# Patient Record
Sex: FEMALE | Race: WHITE | NOT HISPANIC OR LATINO | Employment: OTHER | URBAN - METROPOLITAN AREA
[De-identification: names, ages, dates, MRNs, and addresses within clinical notes are randomized per-mention and may not be internally consistent; named-entity substitution may affect disease eponyms.]

---

## 2023-04-22 LAB
ALANINE AMINOTRANSFERASE (SGPT) (U/L) IN SER/PLAS: 13 U/L (ref 7–45)
ALBUMIN (G/DL) IN SER/PLAS: 3.8 G/DL (ref 3.4–5)
ALKALINE PHOSPHATASE (U/L) IN SER/PLAS: 68 U/L (ref 33–136)
ANION GAP IN SER/PLAS: 13 MMOL/L (ref 10–20)
ASPARTATE AMINOTRANSFERASE (SGOT) (U/L) IN SER/PLAS: 18 U/L (ref 9–39)
BASOPHILS (10*3/UL) IN BLOOD BY AUTOMATED COUNT: 0.05 X10E9/L (ref 0–0.1)
BASOPHILS/100 LEUKOCYTES IN BLOOD BY AUTOMATED COUNT: 0.6 % (ref 0–2)
BILIRUBIN TOTAL (MG/DL) IN SER/PLAS: 0.5 MG/DL (ref 0–1.2)
CALCIUM (MG/DL) IN SER/PLAS: 10 MG/DL (ref 8.6–10.6)
CARBON DIOXIDE, TOTAL (MMOL/L) IN SER/PLAS: 30 MMOL/L (ref 21–32)
CHLORIDE (MMOL/L) IN SER/PLAS: 107 MMOL/L (ref 98–107)
CHOLESTEROL (MG/DL) IN SER/PLAS: 182 MG/DL (ref 0–199)
CHOLESTEROL IN HDL (MG/DL) IN SER/PLAS: 45.1 MG/DL
CHOLESTEROL/HDL RATIO: 4
COBALAMIN (VITAMIN B12) (PG/ML) IN SER/PLAS: 341 PG/ML (ref 211–911)
CREATININE (MG/DL) IN SER/PLAS: 0.86 MG/DL (ref 0.5–1.05)
EOSINOPHILS (10*3/UL) IN BLOOD BY AUTOMATED COUNT: 0.17 X10E9/L (ref 0–0.7)
EOSINOPHILS/100 LEUKOCYTES IN BLOOD BY AUTOMATED COUNT: 2.2 % (ref 0–6)
ERYTHROCYTE DISTRIBUTION WIDTH (RATIO) BY AUTOMATED COUNT: 13.7 % (ref 11.5–14.5)
ERYTHROCYTE MEAN CORPUSCULAR HEMOGLOBIN CONCENTRATION (G/DL) BY AUTOMATED: 32.2 G/DL (ref 32–36)
ERYTHROCYTE MEAN CORPUSCULAR VOLUME (FL) BY AUTOMATED COUNT: 91 FL (ref 80–100)
ERYTHROCYTES (10*6/UL) IN BLOOD BY AUTOMATED COUNT: 4.9 X10E12/L (ref 4–5.2)
ESTIMATED AVERAGE GLUCOSE FOR HBA1C: 120 MG/DL
GFR FEMALE: 73 ML/MIN/1.73M2
GLUCOSE (MG/DL) IN SER/PLAS: 122 MG/DL (ref 74–99)
HEMATOCRIT (%) IN BLOOD BY AUTOMATED COUNT: 44.7 % (ref 36–46)
HEMOGLOBIN (G/DL) IN BLOOD: 14.4 G/DL (ref 12–16)
HEMOGLOBIN A1C/HEMOGLOBIN TOTAL IN BLOOD: 5.8 %
IMMATURE GRANULOCYTES/100 LEUKOCYTES IN BLOOD BY AUTOMATED COUNT: 0.5 % (ref 0–0.9)
LDL: 100 MG/DL (ref 0–99)
LEUKOCYTES (10*3/UL) IN BLOOD BY AUTOMATED COUNT: 7.9 X10E9/L (ref 4.4–11.3)
LYMPHOCYTES (10*3/UL) IN BLOOD BY AUTOMATED COUNT: 1.76 X10E9/L (ref 1.2–4.8)
LYMPHOCYTES/100 LEUKOCYTES IN BLOOD BY AUTOMATED COUNT: 22.3 % (ref 13–44)
MONOCYTES (10*3/UL) IN BLOOD BY AUTOMATED COUNT: 0.52 X10E9/L (ref 0.1–1)
MONOCYTES/100 LEUKOCYTES IN BLOOD BY AUTOMATED COUNT: 6.6 % (ref 2–10)
NEUTROPHILS (10*3/UL) IN BLOOD BY AUTOMATED COUNT: 5.35 X10E9/L (ref 1.2–7.7)
NEUTROPHILS/100 LEUKOCYTES IN BLOOD BY AUTOMATED COUNT: 67.8 % (ref 40–80)
NRBC (PER 100 WBCS) BY AUTOMATED COUNT: 0 /100 WBC (ref 0–0)
PLATELETS (10*3/UL) IN BLOOD AUTOMATED COUNT: 162 X10E9/L (ref 150–450)
POTASSIUM (MMOL/L) IN SER/PLAS: 4.8 MMOL/L (ref 3.5–5.3)
PROTEIN TOTAL: 7 G/DL (ref 6.4–8.2)
SODIUM (MMOL/L) IN SER/PLAS: 145 MMOL/L (ref 136–145)
TRIGLYCERIDE (MG/DL) IN SER/PLAS: 185 MG/DL (ref 0–149)
UREA NITROGEN (MG/DL) IN SER/PLAS: 20 MG/DL (ref 6–23)
VLDL: 37 MG/DL (ref 0–40)

## 2023-09-02 LAB
ALANINE AMINOTRANSFERASE (SGPT) (U/L) IN SER/PLAS: 11 U/L (ref 7–45)
ALBUMIN (G/DL) IN SER/PLAS: 3.8 G/DL (ref 3.4–5)
ALKALINE PHOSPHATASE (U/L) IN SER/PLAS: 57 U/L (ref 33–136)
ANION GAP IN SER/PLAS: 12 MMOL/L (ref 10–20)
ASPARTATE AMINOTRANSFERASE (SGOT) (U/L) IN SER/PLAS: 15 U/L (ref 9–39)
BASOPHILS (10*3/UL) IN BLOOD BY AUTOMATED COUNT: 0.05 X10E9/L (ref 0–0.1)
BASOPHILS/100 LEUKOCYTES IN BLOOD BY AUTOMATED COUNT: 0.7 % (ref 0–2)
BILIRUBIN TOTAL (MG/DL) IN SER/PLAS: 0.4 MG/DL (ref 0–1.2)
CALCIUM (MG/DL) IN SER/PLAS: 9.7 MG/DL (ref 8.6–10.6)
CARBON DIOXIDE, TOTAL (MMOL/L) IN SER/PLAS: 31 MMOL/L (ref 21–32)
CHLORIDE (MMOL/L) IN SER/PLAS: 105 MMOL/L (ref 98–107)
CHOLESTEROL (MG/DL) IN SER/PLAS: 167 MG/DL (ref 0–199)
CHOLESTEROL IN HDL (MG/DL) IN SER/PLAS: 41.5 MG/DL
CHOLESTEROL/HDL RATIO: 4
CREATININE (MG/DL) IN SER/PLAS: 0.91 MG/DL (ref 0.5–1.05)
EOSINOPHILS (10*3/UL) IN BLOOD BY AUTOMATED COUNT: 0.2 X10E9/L (ref 0–0.7)
EOSINOPHILS/100 LEUKOCYTES IN BLOOD BY AUTOMATED COUNT: 2.8 % (ref 0–6)
ERYTHROCYTE DISTRIBUTION WIDTH (RATIO) BY AUTOMATED COUNT: 13.8 % (ref 11.5–14.5)
ERYTHROCYTE MEAN CORPUSCULAR HEMOGLOBIN CONCENTRATION (G/DL) BY AUTOMATED: 32.3 G/DL (ref 32–36)
ERYTHROCYTE MEAN CORPUSCULAR VOLUME (FL) BY AUTOMATED COUNT: 91 FL (ref 80–100)
ERYTHROCYTES (10*6/UL) IN BLOOD BY AUTOMATED COUNT: 4.55 X10E12/L (ref 4–5.2)
ESTIMATED AVERAGE GLUCOSE FOR HBA1C: 111 MG/DL
GFR FEMALE: 68 ML/MIN/1.73M2
GLUCOSE (MG/DL) IN SER/PLAS: 111 MG/DL (ref 74–99)
HEMATOCRIT (%) IN BLOOD BY AUTOMATED COUNT: 41.5 % (ref 36–46)
HEMOGLOBIN (G/DL) IN BLOOD: 13.4 G/DL (ref 12–16)
HEMOGLOBIN A1C/HEMOGLOBIN TOTAL IN BLOOD: 5.5 %
IMMATURE GRANULOCYTES/100 LEUKOCYTES IN BLOOD BY AUTOMATED COUNT: 0.4 % (ref 0–0.9)
LDL: 91 MG/DL (ref 0–99)
LEUKOCYTES (10*3/UL) IN BLOOD BY AUTOMATED COUNT: 7.2 X10E9/L (ref 4.4–11.3)
LYMPHOCYTES (10*3/UL) IN BLOOD BY AUTOMATED COUNT: 1.81 X10E9/L (ref 1.2–4.8)
LYMPHOCYTES/100 LEUKOCYTES IN BLOOD BY AUTOMATED COUNT: 25.1 % (ref 13–44)
MONOCYTES (10*3/UL) IN BLOOD BY AUTOMATED COUNT: 0.58 X10E9/L (ref 0.1–1)
MONOCYTES/100 LEUKOCYTES IN BLOOD BY AUTOMATED COUNT: 8.1 % (ref 2–10)
NEUTROPHILS (10*3/UL) IN BLOOD BY AUTOMATED COUNT: 4.53 X10E9/L (ref 1.2–7.7)
NEUTROPHILS/100 LEUKOCYTES IN BLOOD BY AUTOMATED COUNT: 62.9 % (ref 40–80)
NRBC (PER 100 WBCS) BY AUTOMATED COUNT: 0 /100 WBC (ref 0–0)
PLATELETS (10*3/UL) IN BLOOD AUTOMATED COUNT: 170 X10E9/L (ref 150–450)
POTASSIUM (MMOL/L) IN SER/PLAS: 4 MMOL/L (ref 3.5–5.3)
PROTEIN TOTAL: 6.7 G/DL (ref 6.4–8.2)
SODIUM (MMOL/L) IN SER/PLAS: 144 MMOL/L (ref 136–145)
TRIGLYCERIDE (MG/DL) IN SER/PLAS: 174 MG/DL (ref 0–149)
UREA NITROGEN (MG/DL) IN SER/PLAS: 18 MG/DL (ref 6–23)
VLDL: 35 MG/DL (ref 0–40)

## 2023-09-06 PROBLEM — G47.33 OBSTRUCTIVE SLEEP APNEA: Status: ACTIVE | Noted: 2023-09-06

## 2023-09-06 PROBLEM — E66.9 OBESITY WITH BODY MASS INDEX 30 OR GREATER: Status: ACTIVE | Noted: 2023-09-06

## 2023-09-06 PROBLEM — M51.369 DISC DEGENERATION, LUMBAR: Status: ACTIVE | Noted: 2023-09-06

## 2023-09-06 PROBLEM — E78.5 HYPERLIPIDEMIA: Status: ACTIVE | Noted: 2023-09-06

## 2023-09-06 PROBLEM — R31.9 HEMATURIA: Status: ACTIVE | Noted: 2023-09-06

## 2023-09-06 PROBLEM — E78.2 MIXED HYPERLIPIDEMIA: Status: ACTIVE | Noted: 2023-09-06

## 2023-09-06 PROBLEM — R73.01 IMPAIRED FASTING GLUCOSE: Status: ACTIVE | Noted: 2023-09-06

## 2023-09-06 PROBLEM — Z86.718 HISTORY OF DEEP VEIN THROMBOSIS: Status: ACTIVE | Noted: 2023-09-06

## 2023-09-06 PROBLEM — G47.00 INSOMNIA: Status: ACTIVE | Noted: 2023-09-06

## 2023-09-06 PROBLEM — I34.0 MITRAL VALVE REGURGITATION: Status: ACTIVE | Noted: 2023-09-06

## 2023-09-06 PROBLEM — M47.817 LUMBOSACRAL SPONDYLOSIS: Status: ACTIVE | Noted: 2023-09-06

## 2023-09-06 PROBLEM — J43.9: Status: ACTIVE | Noted: 2023-09-06

## 2023-09-06 PROBLEM — R10.9 ABDOMINAL PAIN: Status: ACTIVE | Noted: 2023-09-06

## 2023-09-06 PROBLEM — I35.9 CALCIFICATION OF AORTIC VALVE: Status: ACTIVE | Noted: 2023-09-06

## 2023-09-06 PROBLEM — N28.1 KIDNEY CYST, ACQUIRED: Status: ACTIVE | Noted: 2023-09-06

## 2023-09-06 PROBLEM — R16.0 ENLARGED LIVER: Status: ACTIVE | Noted: 2023-09-06

## 2023-09-06 PROBLEM — Z96.651 HISTORY OF TOTAL RIGHT KNEE REPLACEMENT: Status: ACTIVE | Noted: 2023-09-06

## 2023-09-06 PROBLEM — M51.36 DISC DEGENERATION, LUMBAR: Status: ACTIVE | Noted: 2023-09-06

## 2023-09-06 PROBLEM — N39.46 MIXED STRESS AND URGE URINARY INCONTINENCE: Status: ACTIVE | Noted: 2023-09-06

## 2023-09-06 PROBLEM — M17.11 OSTEOARTHROSIS, LOCALIZED, PRIMARY, KNEE, RIGHT: Status: ACTIVE | Noted: 2023-09-06

## 2023-09-06 PROBLEM — R91.8 LUNG NODULES: Status: ACTIVE | Noted: 2023-09-06

## 2023-09-06 PROBLEM — I25.10 CORONARY ARTERY DISEASE INVOLVING NATIVE CORONARY ARTERY WITHOUT ANGINA PECTORIS: Status: ACTIVE | Noted: 2023-09-06

## 2023-09-06 PROBLEM — I65.23 ARTERIOSCLEROSIS OF BOTH CAROTID ARTERIES: Status: ACTIVE | Noted: 2023-09-06

## 2023-09-06 PROBLEM — M79.7 FIBROMYALGIA: Status: ACTIVE | Noted: 2023-09-06

## 2023-09-06 PROBLEM — C44.721 SQUAMOUS CELL CARCINOMA OF SKIN OF LOWER LIMB, INCLUDING HIP: Status: ACTIVE | Noted: 2023-04-12

## 2023-09-06 PROBLEM — F17.210 CONTINUOUS DEPENDENCE ON CIGARETTE SMOKING: Status: ACTIVE | Noted: 2023-09-06

## 2023-09-06 PROBLEM — I35.8 AORTIC VALVE SCLEROSIS: Status: ACTIVE | Noted: 2023-09-06

## 2023-09-06 PROBLEM — E78.5 ACQUIRED HYPERLIPOPROTEINEMIA: Status: ACTIVE | Noted: 2023-09-06

## 2023-09-06 PROBLEM — F41.9 ANXIETY: Status: ACTIVE | Noted: 2023-09-06

## 2023-09-06 PROBLEM — R93.1 ELEVATED CORONARY ARTERY CALCIUM SCORE: Status: ACTIVE | Noted: 2023-09-06

## 2023-09-06 PROBLEM — K57.90 DIVERTICULOSIS: Status: ACTIVE | Noted: 2023-09-06

## 2023-09-06 PROBLEM — R41.82 ALTERED MENTAL STATUS: Status: ACTIVE | Noted: 2023-09-06

## 2023-09-06 PROBLEM — I10 BENIGN ESSENTIAL HYPERTENSION: Status: ACTIVE | Noted: 2023-09-06

## 2023-09-06 RX ORDER — EZETIMIBE 10 MG/1
TABLET ORAL
COMMUNITY
Start: 2022-07-25 | End: 2024-01-02

## 2023-09-06 RX ORDER — BUPROPION HYDROCHLORIDE 100 MG/1
TABLET, EXTENDED RELEASE ORAL
COMMUNITY
Start: 2021-07-13

## 2023-09-06 RX ORDER — DULOXETIN HYDROCHLORIDE 60 MG/1
CAPSULE, DELAYED RELEASE ORAL
COMMUNITY
Start: 2020-10-29 | End: 2024-01-02

## 2023-09-06 RX ORDER — ROSUVASTATIN CALCIUM 40 MG/1
TABLET, COATED ORAL
COMMUNITY
Start: 2022-07-25

## 2023-09-06 RX ORDER — IPRATROPIUM BROMIDE 21 UG/1
SPRAY, METERED NASAL
COMMUNITY
Start: 2021-11-24

## 2023-09-06 RX ORDER — CYCLOBENZAPRINE HCL 10 MG
TABLET ORAL
COMMUNITY
Start: 2023-04-19

## 2023-09-06 RX ORDER — POTASSIUM CHLORIDE 20 MEQ/1
TABLET, EXTENDED RELEASE ORAL
COMMUNITY
End: 2024-02-06

## 2023-09-06 RX ORDER — IBUPROFEN 800 MG/1
TABLET ORAL
COMMUNITY
Start: 2023-04-11

## 2023-09-06 RX ORDER — TOPIRAMATE 100 MG/1
TABLET, FILM COATED ORAL
COMMUNITY
Start: 2019-07-25

## 2023-09-06 RX ORDER — CHLORTHALIDONE 25 MG/1
TABLET ORAL
COMMUNITY
Start: 2022-07-27 | End: 2024-02-06

## 2023-09-06 RX ORDER — LOSARTAN POTASSIUM AND HYDROCHLOROTHIAZIDE 12.5; 1 MG/1; MG/1
TABLET ORAL
COMMUNITY

## 2023-09-06 RX ORDER — ATORVASTATIN CALCIUM 80 MG/1
TABLET, FILM COATED ORAL
COMMUNITY
End: 2024-01-02

## 2023-09-06 RX ORDER — PHENOL 1.4 %
AEROSOL, SPRAY (ML) MUCOUS MEMBRANE
COMMUNITY
Start: 2017-06-01

## 2023-09-06 RX ORDER — ASPIRIN 81 MG/1
TABLET ORAL
COMMUNITY
Start: 2017-06-01

## 2023-09-06 RX ORDER — METOPROLOL TARTRATE 25 MG/1
TABLET, FILM COATED ORAL
COMMUNITY

## 2023-09-06 RX ORDER — CHOLECALCIFEROL (VITAMIN D3) 50 MCG
TABLET ORAL
COMMUNITY
Start: 2017-06-01

## 2023-09-06 RX ORDER — TRAMADOL HYDROCHLORIDE 50 MG/1
TABLET ORAL
COMMUNITY

## 2023-09-06 RX ORDER — AMOXICILLIN 500 MG/1
CAPSULE ORAL
COMMUNITY
Start: 2023-03-20

## 2023-09-06 RX ORDER — LOSARTAN POTASSIUM 100 MG/1
TABLET ORAL
COMMUNITY
Start: 2022-07-27 | End: 2024-02-06

## 2023-09-06 RX ORDER — EZETIMIBE AND SIMVASTATIN 10; 40 MG/1; MG/1
TABLET ORAL
COMMUNITY

## 2023-09-11 ENCOUNTER — HOSPITAL ENCOUNTER (OUTPATIENT)
Dept: DATA CONVERSION | Facility: HOSPITAL | Age: 68
End: 2023-09-11

## 2023-09-11 DIAGNOSIS — Z12.31 ENCOUNTER FOR SCREENING MAMMOGRAM FOR MALIGNANT NEOPLASM OF BREAST: ICD-10-CM

## 2023-10-10 ENCOUNTER — TELEPHONE (OUTPATIENT)
Dept: PRIMARY CARE | Facility: CLINIC | Age: 68
End: 2023-10-10
Payer: MEDICARE

## 2023-10-23 DIAGNOSIS — Z12.11 ENCOUNTER FOR SCREENING COLONOSCOPY: Primary | ICD-10-CM

## 2023-10-27 ENCOUNTER — TELEPHONE (OUTPATIENT)
Dept: PRIMARY CARE | Facility: CLINIC | Age: 68
End: 2023-10-27
Payer: MEDICARE

## 2023-11-06 ENCOUNTER — EVALUATION (OUTPATIENT)
Dept: PHYSICAL THERAPY | Facility: CLINIC | Age: 68
End: 2023-11-06
Payer: MEDICARE

## 2023-11-06 DIAGNOSIS — M54.42 CHRONIC BILATERAL LOW BACK PAIN WITH BILATERAL SCIATICA: Primary | ICD-10-CM

## 2023-11-06 DIAGNOSIS — G89.29 CHRONIC BILATERAL LOW BACK PAIN WITH BILATERAL SCIATICA: Primary | ICD-10-CM

## 2023-11-06 DIAGNOSIS — M54.41 CHRONIC BILATERAL LOW BACK PAIN WITH BILATERAL SCIATICA: Primary | ICD-10-CM

## 2023-11-06 DIAGNOSIS — Z12.11 COLON CANCER SCREENING: ICD-10-CM

## 2023-11-06 PROCEDURE — 97110 THERAPEUTIC EXERCISES: CPT | Mod: GP

## 2023-11-06 PROCEDURE — 97161 PT EVAL LOW COMPLEX 20 MIN: CPT | Mod: GP

## 2023-11-06 RX ORDER — SOD SULF/POT CHLORIDE/MAG SULF 1.479 G
TABLET ORAL
Qty: 24 TABLET | Refills: 0 | Status: SHIPPED | OUTPATIENT
Start: 2023-11-06

## 2023-11-06 ASSESSMENT — ACTIVITIES OF DAILY LIVING (ADL): ADL_ASSISTANCE: INDEPENDENT

## 2023-11-06 ASSESSMENT — ENCOUNTER SYMPTOMS
LOSS OF SENSATION IN FEET: 1
OCCASIONAL FEELINGS OF UNSTEADINESS: 1
DEPRESSION: 0

## 2023-11-06 ASSESSMENT — PAIN SCALES - GENERAL: PAINLEVEL_OUTOF10: 4

## 2023-11-06 ASSESSMENT — PAIN DESCRIPTION - DESCRIPTORS: DESCRIPTORS: ACHING;BURNING;PINS AND NEEDLES

## 2023-11-06 ASSESSMENT — PAIN - FUNCTIONAL ASSESSMENT: PAIN_FUNCTIONAL_ASSESSMENT: 0-10

## 2023-11-06 NOTE — PROGRESS NOTES
Physical Therapy Evaluation and Treatment      Patient Name: Dyana Irene  MRN: [id number]  Today's Date: 11/6/2023  Time Calculation  Start Time: 0430  Stop Time: 0515  Time Calculation (min): 45 min      Assessment:     Pt w/ onset of LBP radiating into BLE affecting gait, activity tolerance, ADL/IADL and functional mobility; presents w/ palpable pain, decreased ROM/flexibility, reduced core and support mm strength, altered gait, balance, transfer ability, decreased postural/body mechanics awareness, fall potential; Skilled PT is indicated to improve pt's functional status and educate in self-management of condition              Plan:  Treatment/Interventions: Cryotherapy, Education/ Instruction, Electrical stimulation, Gait training, Hot pack, Manual therapy, Neuromuscular re-education, Therapeutic activities  PT Plan: Skilled PT  PT Frequency: 2 times per week  Duration: 10 weeks  Onset Date: 07/01/23  Certification Period Start Date: 11/06/23  Certification Period End Date: 02/04/24  Number of Treatments Authorized: 20/mn  Rehab Potential: Good  Plan of Care Agreement: Patient    Current Problem:   1. Chronic bilateral low back pain with bilateral sciatica  Follow Up In Physical Therapy          Subjective    General:  General  Reason for Referral: Pt referred to PT due to increasing LBPradiating into BLEs, legs feel weak due to these sx  Referred By: Dr Hidalgo  Past Medical History Relevant to Rehab: Pt w/ h/o R TKR early 2023, L knee TKR in 2014; no h/o back surgery- multple series of injections; has FM which affects all activity angelica  Precautions:  Precautions  STEADI Fall Risk Score (The score of 4 or more indicates an increased risk of falling): 7  Precautions Comment: mod fall risk     Pain:  Pain Assessment  Pain Assessment: 0-10  Pain Score: 4  Pain Type: Chronic pain  Pain Location: Back  Pain Orientation: Lower  Pain Radiating Towards: BLEs  Pain Descriptors: Aching, Burning, Pins and needles  Pain  Frequency: Constant/continuous  Pain Onset: Ongoing  Clinical Progression: Gradually worsening  Effect of Pain on Daily Activities: Pt reports pain limits her ability to do housework and walk her dog  Patient's Stated Pain Goal: 3  Pain Interventions: Medication (See MAR), Home medication, Rest, Heat applied, Cold applied  Response to Interventions: minimal effect long teerm  Home Living:     Pt lives alone in split entry home; has some help for house and yard work    Prior Level of Function:  Prior Function Per Pt/Caregiver Report  Level of [place]: Independent with ADLs and functional transfers  Receives Help From: Neighbor (if needed)  ADL Assistance: Independent  Homemaking Assistance:  (house keeper,  for yard)  Ambulatory Assistance: Independent  Vocational: Retired  Leisure: Pt feels she does not do much due to pain, likes to walk and bike ride; member of [employer] but stopped due to pain    Objective     General Assessments:  Posture Comment: FWD posture in standing, no lumbar lordosis, no shift    Range of Motion Comments: LB ext 0 w/ pain, flex 70 w/ pain, R SB very painful to 15, L SB mild pain to 20; all ROM of R hip very pain to R LB    Strength Comments: core 3/5- pain; R hip flex 4-/5, L 4/5; R hip abd 3+/5, L 4-/5; B hip add 4/5 pt can 1/2 bridge trunk w/ pain    Flexibility Comment: R HS -40, L -35, R piri 50%, L 25%; R ITB 25%, L 10%    Palpation Comment: Pt very hypersensitive to palpation; R LS, piri/glut, ITB > L very tender   Special Tests Comment: (-) slump; (-) SLR  Functional Assessments:  Gait Comment: Pt walks w/ flexed trunk, slow gait pattern w/o devices  , Balance Comment: Unable to SLS either LE    , Bed Mobility Comment: rolling and supine to sit are very slow and painful  , and Transfers Comment: relies on UE for sit to stand    Outcome Measures:  Other Measures  Oswestry Disablity Index (ELSIE): 46%     Treatments:  Therapeutic Exercise  Therapeutic Exercise Performed:  "Yes  Therapeutic Exercise Activity 1: supine R/L TR 2 x 10  Therapeutic Exercise Activity 2: HL SKTC R/L 5 sec holds, 2 x 5  Therapeutic Exercise Activity 3: standing back ext to neutral 5 sec x 10  Therapeutic Exercise Activity 4: seated correct posture w/ lumbar roll x 5'  Therapeutic Exercise Activity 5: seated TA 5 sec x 10  Therapeutic Exercise Activity 6: supine TA w/ self tactile assist 5 sec x 10        OP EDUCATION:     Access Code: [email or web address]  URL: [email or web address]  Date: 11/06/2023  Prepared by: Maritza Damico    Program Notes  Try to stay in pain- minimized ranges with the exercises.You can use ice and heat at 15- 20 minute intervals- you can do the \"belly tightening\" exercises when sitting in the chair w/ lumbar roll in your back     Exercises  - Supine Lower Trunk Rotation  - 1-2 x daily - 7 x weekly - 1-2 sets - 10 reps - 3-5 sec hold  - Hooklying Single Knee to Chest  - 1-2 x daily - 7 x weekly - 1-2 sets - 5 reps - 5 hold  - Standing Back Extension at Wall  - 1-2 x daily - 7 x weekly - 1-2 sets - 10 reps - 3-5 sec hold  - Supine Transversus Abdominis Bracing - Hands on Stomach  - 1-2 x daily - 7 x weekly - 1-2 sets - 10 reps - 5 sec hold  - Seated Correct Posture  - 7 x weekly  Goals:  Active       PT Problem       PT Goal 1       Start:  11/06/23    Expected End:  02/04/24       STG(4 wks):  - Pt. will have at least 10% increase in flexibility of affected joints/structures being treated- back/LE  - Pt will have improved postural awareness for daily activities and exercises as evidenced by self corrections for improper positions/movements  - Pt. will be able to perform safe and independent transfers/bed mobility with less effort  - Pt will be independent and compliant with HEP  - Pt will have return of at least 50% AROM of lumbar spine         PT Goal 2       Start:  11/06/23    Expected End:  02/04/24         LTG( 10 wks)  - Progress HEP and RONNA within pt tolerance to goal " attainment, including return to ADL/IADL and functional activities w/ less pain  - Pt. will have improved balance as evidenced by increased SLS time of at least 10 sec for improved everyday balance and less fall potential  - Pt. will have safe, independent ambulation without assistive devices for community distances and stairs/curbs with normalized gait pattern and erect posture  - Pt. will have reduction of pain to no greater than 3 of 10 with ex. and activities  - Pt. will have increased strength of at least 1/3 muscle grade in affected muscle groups- core/LB support mm  - Pt. will have at least 10% improvement in functional test score- ELSIE-as evidenced by self report of improved ability to perform every day activities w/ greater ease and less pain  - Pt will be supervised and a gait belt will be used if needed to assure safety in the clinic during treatment

## 2023-11-06 NOTE — Clinical Note
November 6, 2023     Patient: Dyana Irene   YOB: 1955   Date of Visit: 11/6/2023       To Whom it May Concern:    Dyana Irene was seen in my clinic on 11/6/2023. She {Return to school/sport:64220}.    If you have any questions or concerns, please don't hesitate to call.         Sincerely,          Maritza Damico, PT        CC: No Recipients

## 2023-11-06 NOTE — Clinical Note
November 6, 2023     Patient: Dyana Irene   YOB: 1955   Date of Visit: 11/6/2023       To Whom It May Concern:    It is my medical opinion that Dyana Irene {Work release (duty restriction):68607}.    If you have any questions or concerns, please don't hesitate to call.         Sincerely,        Maritza Damico, PT    CC: No Recipients No

## 2023-11-07 DIAGNOSIS — Z12.11 SPECIAL SCREENING FOR MALIGNANT NEOPLASMS, COLON: ICD-10-CM

## 2023-11-07 DIAGNOSIS — Z86.010 PERSONAL HISTORY OF COLONIC POLYPS: Primary | ICD-10-CM

## 2023-11-07 RX ORDER — POLYETHYLENE GLYCOL 3350, SODIUM SULFATE ANHYDROUS, SODIUM BICARBONATE, SODIUM CHLORIDE, POTASSIUM CHLORIDE 236; 22.74; 6.74; 5.86; 2.97 G/4L; G/4L; G/4L; G/4L; G/4L
POWDER, FOR SOLUTION ORAL
Qty: 4000 ML | Refills: 0 | Status: SHIPPED | OUTPATIENT
Start: 2023-11-07

## 2023-11-07 RX ORDER — POLYETHYLENE GLYCOL 3350, SODIUM SULFATE ANHYDROUS, SODIUM BICARBONATE, SODIUM CHLORIDE, POTASSIUM CHLORIDE 236; 22.74; 6.74; 5.86; 2.97 G/4L; G/4L; G/4L; G/4L; G/4L
4000 POWDER, FOR SOLUTION ORAL ONCE
Qty: 4000 ML | Refills: 0 | Status: SHIPPED | OUTPATIENT
Start: 2023-11-07 | End: 2023-11-07

## 2023-11-08 ENCOUNTER — TREATMENT (OUTPATIENT)
Dept: PHYSICAL THERAPY | Facility: CLINIC | Age: 68
End: 2023-11-08
Payer: MEDICARE

## 2023-11-08 DIAGNOSIS — M54.42 CHRONIC BILATERAL LOW BACK PAIN WITH BILATERAL SCIATICA: ICD-10-CM

## 2023-11-08 DIAGNOSIS — M54.41 CHRONIC BILATERAL LOW BACK PAIN WITH BILATERAL SCIATICA: ICD-10-CM

## 2023-11-08 DIAGNOSIS — G89.29 CHRONIC BILATERAL LOW BACK PAIN WITH BILATERAL SCIATICA: ICD-10-CM

## 2023-11-08 PROCEDURE — 97140 MANUAL THERAPY 1/> REGIONS: CPT | Mod: GP

## 2023-11-08 PROCEDURE — 97110 THERAPEUTIC EXERCISES: CPT | Mod: GP

## 2023-11-08 ASSESSMENT — PAIN - FUNCTIONAL ASSESSMENT: PAIN_FUNCTIONAL_ASSESSMENT: 0-10

## 2023-11-08 ASSESSMENT — PAIN SCALES - GENERAL: PAINLEVEL_OUTOF10: 6

## 2023-11-08 ASSESSMENT — PAIN DESCRIPTION - DESCRIPTORS: DESCRIPTORS: ACHING;BURNING

## 2023-11-08 NOTE — PROGRESS NOTES
"  Physical Therapy Treatment    Patient Name: Dyana Irene  MRN: [id number]  Today's Date: 11/8/2023  Time Calculation  Start Time: 0800  Stop Time: 0844  Time Calculation (min): 44 min  Current Problem  1. Chronic bilateral low back pain with bilateral sciatica  Follow Up In Physical Therapy          Insurance:  Number of Treatments Authorized: V#2/mn  Certification Period Start Date: 11/06/23  Certification Period End Date: 02/04/24    Subjective   General  General Comment: Pt reports she is feeling sore from the exercises, josee on R side; aware that she is \"hunching\" forward and trying to stand more erect but painful; going to try to start the chair yoga again      Performing HEP?: Yes    Precautions  Precautions  Precautions Comment: mod fall risk  Pain  Pain Assessment: 0-10  Pain Score: 6  Pain Location: Back  Pain Orientation: Lower  Pain Radiating Towards: R>L LE  Pain Descriptors: Aching, Burning  Pain Frequency: Constant/continuous    Objective   Flexed trunk gait pattern  Very tender R>L LS eris, piri/glut    Treatments:     Therapeutic Exercise  Therapeutic Exercise Performed: Yes  Therapeutic Exercise Activity 1: Scifit LE hills L1 x 5'  Therapeutic Exercise Activity 2: incline GS stretch x 1'  Therapeutic Exercise Activity 3: HT raises x 1'  Therapeutic Exercise Activity 4: back ext at // bar to neutrall 5 sec x 1'  Therapeutic Exercise Activity 5: R/L HS stretch on stool x 30 sec ea  Therapeutic Exercise Activity 6: sitting w/ lumbar roll: TA 5 sec x 2'  Therapeutic Exercise Activity 7: sitting w/ UE on SB: FF x 1', R/L SB x 1'  Therapeutic Exercise Activity 8: supine SB TR x 1;, DKTC x 1'  Therapeutic Exercise Activity 9: supine clam shells x 1'         Manual Therapy  Manual Therapy Performed: Yes  Manual Therapy Activity 1: PROM B hips  Manual Therapy Activity 2: MFR B leg pull- painful, did indiv w/ oscillations  Manual Therapy Activity 3: stretch B HS, quads, psoas, piri/gluts,            "   Assessment:  PT Assessment  Assessment Comment: Pt w/ fair to good Rx angelica, flexed posture w/ standing and walking- able to erect to neutral for short intervals w/o increased pain; pt tight B hips/LE, R>L back support mm very tender, pt felt some sx relief by end of session    Plan:  OP PT Plan  Onset Date: 07/01/23  Certification Period Start Date: 11/06/23  Certification Period End Date: 02/04/24  Number of Treatments Authorized: V#2/mn  Cont Rx for ROM/flexibility, sx mgt, soft tissue mgt, gait/balance and transfer training, mm re-ed, core stabilization, HEP w/ progression, posture, body mechanics and safety training so that pt can perform daily functional activities w/ less pain, more safely and w/ greater ease   Goals:  Active       PT Problem       PT Goal 1       Start:  11/06/23    Expected End:  02/04/24       STG(4 wks):  - Pt. will have at least 10% increase in flexibility of affected joints/structures being treated- back/LE  - Pt will have improved postural awareness for daily activities and exercises as evidenced by self corrections for improper positions/movements  - Pt. will be able to perform safe and independent transfers/bed mobility with less effort  - Pt will be independent and compliant with HEP  - Pt will have return of at least 50% AROM of lumbar spine         PT Goal 2       Start:  11/06/23    Expected End:  02/04/24         LTG( 10 wks)  - Progress HEP and RONNA within pt tolerance to goal attainment, including return to ADL/IADL and functional activities w/ less pain  - Pt. will have improved balance as evidenced by increased SLS time of at least 10 sec for improved everyday balance and less fall potential  - Pt. will have safe, independent ambulation without assistive devices for community distances and stairs/curbs with normalized gait pattern and erect posture  - Pt. will have reduction of pain to no greater than 3 of 10 with ex. and activities  - Pt. will have increased strength of at  least 1/3 muscle grade in affected muscle groups- core/LB support mm  - Pt. will have at least 10% improvement in functional test score- ELSIE-as evidenced by self report of improved ability to perform every day activities w/ greater ease and less pain  - Pt will be supervised and a gait belt will be used if needed to assure safety in the clinic during treatment                  Maritza Damico, PT

## 2023-11-16 ENCOUNTER — TREATMENT (OUTPATIENT)
Dept: PHYSICAL THERAPY | Facility: CLINIC | Age: 68
End: 2023-11-16
Payer: MEDICARE

## 2023-11-16 DIAGNOSIS — M54.41 CHRONIC BILATERAL LOW BACK PAIN WITH BILATERAL SCIATICA: Primary | ICD-10-CM

## 2023-11-16 DIAGNOSIS — G89.29 CHRONIC BILATERAL LOW BACK PAIN WITH BILATERAL SCIATICA: Primary | ICD-10-CM

## 2023-11-16 DIAGNOSIS — M54.42 CHRONIC BILATERAL LOW BACK PAIN WITH BILATERAL SCIATICA: Primary | ICD-10-CM

## 2023-11-16 PROCEDURE — 97110 THERAPEUTIC EXERCISES: CPT | Mod: GP,CQ

## 2023-11-16 PROCEDURE — 97112 NEUROMUSCULAR REEDUCATION: CPT | Mod: CQ,GP

## 2023-11-16 ASSESSMENT — PAIN - FUNCTIONAL ASSESSMENT: PAIN_FUNCTIONAL_ASSESSMENT: 0-10

## 2023-11-16 ASSESSMENT — PAIN SCALES - GENERAL: PAINLEVEL_OUTOF10: 7

## 2023-11-16 NOTE — PROGRESS NOTES
"  Physical Therapy Treatment    Patient Name: Dyana Irene  MRN: [id number]  Today's Date: 11/16/2023  Time Calculation  Start Time: 0847  Stop Time: 0834  Time Calculation (min): 1427 min    Current Problem  1. Chronic bilateral low back pain with bilateral sciatica  Follow Up In Physical Therapy          Insurance:  Number of Treatments Authorized: V#3/mn  Certification Period Start Date: 11/06/23  Certification Period End Date: 02/04/24      Subjective   General  Reason for Referral: Pt referred to PT due to increasing LBPradiating into BLEs, legs feel weak due to these sx  Referred By: Dr Hidalgo  Past Medical History Relevant to Rehab: Pt w/ h/o R TKR early 2023, L knee TKR in 2014; no h/o back surgery- multple series of injections; has FM which affects all activity angelica  General Comment: My back is sore.  Tried to sleep in the bed, but when I turned onto my stomach may pain increased so much than I had to return to the recliner. C/O R hip/groin pain.  F/U with physician tomorrow.    Performing HEP?: Partially    Precautions  Precautions  Precautions Comment: mod fall risk (Medical Health History form reviewed - [facility])  Pain  Pain Assessment: 0-10  Pain Score: 7  Pain Location: Back  Pain Orientation: Lower    Objective       Treatments:    Therapeutic Exercise  Therapeutic Exercise Activity 1: Scifit LE hills L1 x 5'  Therapeutic Exercise Activity 2: incline g/s stretch x 1 min  Therapeutic Exercise Activity 3: R/L HS stretch x 30\" ea  Therapeutic Exercise Activity 4: standing R/L HF stretch 2 x 30\" ea  Therapeutic Exercise Activity 5: REISitting over sm ball 5\" x 10  Therapeutic Exercise Activity 6: side stepping along //bars x 1 min  Therapeutic Exercise Activity 7: alt hip ext with opp UE lift at // bars x 1 min  Therapeutic Exercise Activity 8: HL pelvic tilting z 2 min  Therapeutic Exercise Activity 9: HL LTR x 2 min  Therapeutic Exercise Activity 10: supine RTB iso clam 2 x 10 ea  Therapeutic Exercise " "Activity 11: supine B SAQ with iso glut squeeze 5\" x 10  Therapeutic Exercise Activity 12: Review and perform seated R knee ext with OP and QS with lift x 10 ea    Balance/Neuromuscular Re-Education  Balance/Neuromuscular Re-Education Activity 1: NBOS on airex pad x 1 min  Balance/Neuromuscular Re-Education Activity 2: NBOS on airex with cervical rotation x 1 min  Balance/Neuromuscular Re-Education Activity 3: DLB on airex pad with alt foot tap on 8\" stool  x 1 min w/o UE assist    Manual Therapy  Manual Therapy Activity 1: PROM B hips as angelica  Manual Therapy Activity 2: MFR R/L leg pull with knee flexed as angelica                     OP EDUCATION:       Assessment:  PT Assessment  Assessment Comment: Dexter experienced a lot of R hip/groin  pulling and pain today.  She is unable to fully extend her R hip and knee to neutral extension in supine.  The firmness of the plinth was uncomfortable on her back.  Encourage postrual awareness with standing mre erect and core activation with ADLs and transfers.    Plan:  OP PT Plan  Onset Date: 07/01/23  Certification Period Start Date: 11/06/23  Certification Period End Date: 02/04/24  Number of Treatments Authorized: V#3/mn  Cont Rx for ROM/flexibility, sx mgt, soft tissue mgt, gait/balance and transfer training, mm re-ed, core stabilization, HEP w/ progression, posture, body mechanics and safety training so that pt can perform daily functional activities w/ less pain, more safely and w/ greater ease    Goals:  Active       PT Problem       PT Goal 1       Start:  11/06/23    Expected End:  02/04/24       STG(4 wks):  - Pt. will have at least 10% increase in flexibility of affected joints/structures being treated- back/LE  - Pt will have improved postural awareness for daily activities and exercises as evidenced by self corrections for improper positions/movements  - Pt. will be able to perform safe and independent transfers/bed mobility with less effort  - Pt will be " independent and compliant with HEP  - Pt will have return of at least 50% AROM of lumbar spine         PT Goal 2       Start:  11/06/23    Expected End:  02/04/24         LTG( 10 wks)  - Progress HEP and RONNA within pt tolerance to goal attainment, including return to ADL/IADL and functional activities w/ less pain  - Pt. will have improved balance as evidenced by increased SLS time of at least 10 sec for improved everyday balance and less fall potential  - Pt. will have safe, independent ambulation without assistive devices for community distances and stairs/curbs with normalized gait pattern and erect posture  - Pt. will have reduction of pain to no greater than 3 of 10 with ex. and activities  - Pt. will have increased strength of at least 1/3 muscle grade in affected muscle groups- core/LB support mm  - Pt. will have at least 10% improvement in functional test score- ELSIE-as evidenced by self report of improved ability to perform every day activities w/ greater ease and less pain  - Pt will be supervised and a gait belt will be used if needed to assure safety in the clinic during treatment                  Yumiko Rutherford, PTA

## 2023-11-21 ENCOUNTER — TREATMENT (OUTPATIENT)
Dept: PHYSICAL THERAPY | Facility: CLINIC | Age: 68
End: 2023-11-21
Payer: MEDICARE

## 2023-11-21 DIAGNOSIS — M54.42 CHRONIC BILATERAL LOW BACK PAIN WITH BILATERAL SCIATICA: ICD-10-CM

## 2023-11-21 DIAGNOSIS — G89.29 CHRONIC BILATERAL LOW BACK PAIN WITH BILATERAL SCIATICA: ICD-10-CM

## 2023-11-21 DIAGNOSIS — M54.41 CHRONIC BILATERAL LOW BACK PAIN WITH BILATERAL SCIATICA: ICD-10-CM

## 2023-11-21 PROCEDURE — 97110 THERAPEUTIC EXERCISES: CPT | Mod: GP

## 2023-11-21 PROCEDURE — 97140 MANUAL THERAPY 1/> REGIONS: CPT | Mod: GP

## 2023-11-21 ASSESSMENT — PAIN - FUNCTIONAL ASSESSMENT: PAIN_FUNCTIONAL_ASSESSMENT: 0-10

## 2023-11-21 ASSESSMENT — PAIN SCALES - GENERAL: PAINLEVEL_OUTOF10: 5 - MODERATE PAIN

## 2023-11-21 NOTE — PROGRESS NOTES
"  Physical Therapy Treatment    Patient Name: Dyana Irene  MRN: [id number]  Today's Date: 11/21/2023  Time Calculation  Start Time: 0848  Stop Time: 0930  Time Calculation (min): 42 min  Current Problem  1. Chronic bilateral low back pain with bilateral sciatica  Follow Up In Physical Therapy          Insurance:  Number of Treatments Authorized: V#4/mn  Certification Period Start Date: 11/06/23  Certification Period End Date: 02/04/24    Subjective   General  General Comment: Pt notes she was pretty sore after last Rx, but overall not bad today; feels like she is improving, R knee has been hurting w/ the attempts to get it straighter      Performing HEP?: partially- every other day    Precautions  Precautions  Precautions Comment: mod fall risk  Pain  Pain Assessment: 0-10  Pain Score: 5 - Moderate pain  Pain Location: Back  Pain Orientation: Lower  Pain Radiating Towards: R hip    Objective   Standing w/ erect posture and w/o pain, walking first several feel erect, then begins to flex trunk     Treatments:  Therapeutic Exercise  Therapeutic Exercise Activity 1: Scifit LE hills L1 x 5'  Therapeutic Exercise Activity 2: incline g/s stretch x 1 min  Therapeutic Exercise Activity 3: R/L HS stretch x 30\" ea  Therapeutic Exercise Activity 4: standing R/L HF stretch x 30\" ea  Therapeutic Exercise Activity 5: REISitting over sm ball 5\" x 2'  Therapeutic Exercise Activity 6: side stepping along //bars x 1 min  Therapeutic Exercise Activity 7: alt hip ext with opp UE lift at // bars x 1 min  Therapeutic Exercise Activity 8: sit to stand w/ 1 Airex in seat, w/o UE x 10  Therapeutic Exercise Activity 9: SB DKTC x 1'  Therapeutic Exercise Activity 10: SB TR x 1'  Therapeutic Exercise Activity 11: supine B SAQ with iso glut squeeze 5\" x 1'  NBOS on Airex pad x 1 min  NBOS on Airex with cervical rotation x 10 ea way   DLB on Airex pad with alt foot tap on 8\" stool  x 1 min w/o UE assist  R/L HF stretch off table x 1' ea- " dangle/kick R     Manual Therapy  Manual Therapy Activity 1: PROM B hips as angelica, R knee ext OP  Manual Therapy Activity 2: MFR R/L leg pull with opp knee flexed as angelica  Supine Stretch B HS, ITB, adds, piri/glut, GS                    (Non Bill): CP to LB seated after Rx x 10'- bell   OP EDUCATION: do HEP at least daily        Assessment:  PT Assessment  Assessment Comment: Pt w/ better Rx for TE, R hip and knee most painful w/ WB tasks as well as ROM/stretching, pt w/ less tendency to FWD lean in standing, some improvement w/ gait, advised at least daily HEP compliance as angelica    Plan:  OP PT Plan  Onset Date: 07/01/23  Certification Period Start Date: 11/06/23  Certification Period End Date: 02/04/24  Number of Treatments Authorized: V#4/mn  Cont Rx for ROM/flexibility, sx mgt, soft tissue mgt, gait/balance and transfer training, mm re-ed, core stabilization, HEP w/ progression, posture, body mechanics and safety training so that pt can perform daily functional activities w/ less pain, more safely and w/ greater ease   Goals:  Active       PT Problem       PT Goal 1       Start:  11/06/23    Expected End:  02/04/24       STG(4 wks):  - Pt. will have at least 10% increase in flexibility of affected joints/structures being treated- back/LE  - Pt will have improved postural awareness for daily activities and exercises as evidenced by self corrections for improper positions/movements  - Pt. will be able to perform safe and independent transfers/bed mobility with less effort  - Pt will be independent and compliant with HEP  - Pt will have return of at least 50% AROM of lumbar spine         PT Goal 2       Start:  11/06/23    Expected End:  02/04/24         LTG( 10 wks)  - Progress HEP and RONNA within pt tolerance to goal attainment, including return to ADL/IADL and functional activities w/ less pain  - Pt. will have improved balance as evidenced by increased SLS time of at least 10 sec for improved everyday balance and  less fall potential  - Pt. will have safe, independent ambulation without assistive devices for community distances and stairs/curbs with normalized gait pattern and erect posture  - Pt. will have reduction of pain to no greater than 3 of 10 with ex. and activities  - Pt. will have increased strength of at least 1/3 muscle grade in affected muscle groups- core/LB support mm  - Pt. will have at least 10% improvement in functional test score- ELSIE-as evidenced by self report of improved ability to perform every day activities w/ greater ease and less pain  - Pt will be supervised and a gait belt will be used if needed to assure safety in the clinic during treatment                  Maritza Damico, PT

## 2023-11-24 ENCOUNTER — APPOINTMENT (OUTPATIENT)
Dept: PHYSICAL THERAPY | Facility: CLINIC | Age: 68
End: 2023-11-24
Payer: MEDICARE

## 2023-11-27 ENCOUNTER — TREATMENT (OUTPATIENT)
Dept: PHYSICAL THERAPY | Facility: CLINIC | Age: 68
End: 2023-11-27
Payer: MEDICARE

## 2023-11-27 DIAGNOSIS — M54.41 CHRONIC BILATERAL LOW BACK PAIN WITH BILATERAL SCIATICA: ICD-10-CM

## 2023-11-27 DIAGNOSIS — G89.29 CHRONIC BILATERAL LOW BACK PAIN WITH BILATERAL SCIATICA: ICD-10-CM

## 2023-11-27 DIAGNOSIS — M54.42 CHRONIC BILATERAL LOW BACK PAIN WITH BILATERAL SCIATICA: ICD-10-CM

## 2023-11-27 PROCEDURE — 97140 MANUAL THERAPY 1/> REGIONS: CPT | Mod: GP

## 2023-11-27 PROCEDURE — 97110 THERAPEUTIC EXERCISES: CPT | Mod: GP

## 2023-11-27 ASSESSMENT — PAIN - FUNCTIONAL ASSESSMENT: PAIN_FUNCTIONAL_ASSESSMENT: 0-10

## 2023-11-27 ASSESSMENT — PAIN SCALES - GENERAL: PAINLEVEL_OUTOF10: 4

## 2023-11-27 NOTE — PROGRESS NOTES
"  Physical Therapy Treatment    Patient Name: Dyana Irene  MRN: [id number]  Today's Date: 11/27/2023  Time Calculation  Start Time: 0516  Stop Time: 0600  Time Calculation (min): 44 min  Current Problem  1. Chronic bilateral low back pain with bilateral sciatica  Follow Up In Physical Therapy          Insurance:  Number of Treatments Authorized: V#5/mn  Certification Period Start Date: 11/06/23  Certification Period End Date: 02/04/24    Subjective   General  General Comment: Pt was ill over the weekend w/ stomach virus sx- better now but feel a little weak      Performing HEP?: Partially    Precautions  Precautions  Precautions Comment: mod fall risk  Pain  Pain Assessment: 0-10  Pain Score: 4  Pain Location: Back  Pain Orientation: Lower  Pain Radiating Towards: R hip    Objective   Pt cont to walk w/ flexed trunk posture, able to stand fully erect at // bar  Less struggle w/ rolling and supine<-->sit; able to bridge trunk w/ min pain and better effort      Treatments:  Therapeutic Exercise Activity 1: Scifit LE hills L1 x 5'  Therapeutic Exercise Activity 2: incline g/s stretch x 1 min; HT raises x 1'  Therapeutic Exercise Activity 3: R/L HS stretch x 30\" ea  Therapeutic Exercise Activity 4: standing R/L HF stretch x 30\" ea  Therapeutic Exercise Activity 5: seated w/ UE on SB: FF x 1', R/L SB x 1'  Therapeutic Exercise Activity 6: side stepping along //bars x 1 min  Therapeutic Exercise Activity 7: alt hip ext with opp UE lift at // bars x 1 min  Therapeutic Exercise Activity 8: sit to stand w/ 1 Airex in seat, w/o UE x 10  Therapeutic Exercise Activity 9: SB DKTC x 1'  Therapeutic Exercise Activity 10: SB TR x 1'  Therapeutic Exercise Activity 11: supine B SAQ with iso glut squeeze 5\" x 1'  NBOS on Airex pad x 1 min w/o UE   NBOS on Airex with CW/CCW hip circles x 10 ea  DLB on Airex pad with alt foot tap on 8\" stool  x 1 min w/o UE assist as able        Manual Therapy  Manual Therapy Activity 1: PROM B hips " as angelica, R knee ext OP  Manual Therapy Activity 2: MFR R/L leg pull with opp knee flexed as angelica  Supine Stretch B HS, ITB, adds, piri/glut, GS  OP EDUCATION: do HEP at least daily- resume                         Assessment:  PT Assessment  Assessment Comment: Pt cont to report less LBP w/ TE, R knee remains tight and painful but has been limiting WB ex angelica as much; pt cont to prefer a flexed posture when walking- erecting to neutral at // bar for standing ex w/ greater ease    Plan:  OP PT Plan  Onset Date: 07/01/23  Certification Period Start Date: 11/06/23  Certification Period End Date: 02/04/24  Number of Treatments Authorized: V#5/mn  Cont Rx for ROM/flexibility, sx mgt and soft tissue mgt, gait/balance and transfer training, mm re-ed, core stabilization, HEP w/ progression, posture, body mechanics and safety training so that pt can perform daily functional activities w/ less pain, more safely and w/ greater ease   Goals:  Active       PT Problem       PT Goal 1       Start:  11/06/23    Expected End:  02/04/24       STG(4 wks):  - Pt. will have at least 10% increase in flexibility of affected joints/structures being treated- back/LE  - Pt will have improved postural awareness for daily activities and exercises as evidenced by self corrections for improper positions/movements  - Pt. will be able to perform safe and independent transfers/bed mobility with less effort  - Pt will be independent and compliant with HEP  - Pt will have return of at least 50% AROM of lumbar spine         PT Goal 2       Start:  11/06/23    Expected End:  02/04/24         LTG( 10 wks)  - Progress HEP and RONNA within pt tolerance to goal attainment, including return to ADL/IADL and functional activities w/ less pain  - Pt. will have improved balance as evidenced by increased SLS time of at least 10 sec for improved everyday balance and less fall potential  - Pt. will have safe, independent ambulation without assistive devices for  community distances and stairs/curbs with normalized gait pattern and erect posture  - Pt. will have reduction of pain to no greater than 3 of 10 with ex. and activities  - Pt. will have increased strength of at least 1/3 muscle grade in affected muscle groups- core/LB support mm  - Pt. will have at least 10% improvement in functional test score- ELSIE-as evidenced by self report of improved ability to perform every day activities w/ greater ease and less pain  - Pt will be supervised and a gait belt will be used if needed to assure safety in the clinic during treatment                  Maritza Damico, PT

## 2023-11-29 ENCOUNTER — TREATMENT (OUTPATIENT)
Dept: PHYSICAL THERAPY | Facility: CLINIC | Age: 68
End: 2023-11-29
Payer: MEDICARE

## 2023-11-29 DIAGNOSIS — G89.29 CHRONIC BILATERAL LOW BACK PAIN WITH BILATERAL SCIATICA: ICD-10-CM

## 2023-11-29 DIAGNOSIS — M54.42 CHRONIC BILATERAL LOW BACK PAIN WITH BILATERAL SCIATICA: ICD-10-CM

## 2023-11-29 DIAGNOSIS — M54.41 CHRONIC BILATERAL LOW BACK PAIN WITH BILATERAL SCIATICA: ICD-10-CM

## 2023-11-29 PROCEDURE — 97140 MANUAL THERAPY 1/> REGIONS: CPT | Mod: GP

## 2023-11-29 PROCEDURE — 97110 THERAPEUTIC EXERCISES: CPT | Mod: GP

## 2023-11-29 ASSESSMENT — PAIN SCALES - GENERAL: PAINLEVEL_OUTOF10: 5 - MODERATE PAIN

## 2023-11-29 ASSESSMENT — PAIN - FUNCTIONAL ASSESSMENT: PAIN_FUNCTIONAL_ASSESSMENT: 0-10

## 2023-11-29 NOTE — PROGRESS NOTES
"  Physical Therapy Treatment    Patient Name: Dyana Irene  MRN: [id number]  Today's Date: 11/29/2023  Time Calculation  Start Time: 0846  Stop Time: 0932  Time Calculation (min): 46 min  Current Problem  1. Chronic bilateral low back pain with bilateral sciatica  Follow Up In Physical Therapy          Insurance:  Number of Treatments Authorized: V#6/mn  Certification Period Start Date: 11/06/23  Certification Period End Date: 02/04/24    Subjective   General  General Comment: Pt reports she is feeling achy in back, but mostly still the knee- made appt w/ the ortho to look at the TKR to make sure everything is ok; appt is 12/12      Performing HEP?: Yes    Precautions  Precautions  Precautions Comment: mod fall risk  Pain  Pain Assessment: 0-10  Pain Score: 5 - Moderate pain  Pain Location: Back  Pain Orientation: Lower  Pain Radiating Towards: BLE very achy    Objective   Quite flexed at trunk entering Rx area, more erect exiting  Very tender R piri/glut, quad and SI- RLE tight w/ manual stretching > L         Treatments:  Therapeutic Exercise Activity 1: Scifit LE hills L1 x 5'  Therapeutic Exercise Activity 2: incline g/s stretch x 1 min; HT raises x 1'  Therapeutic Exercise Activity 3: R/L HS stretch x 30\" ea- pain R post knee  Therapeutic Exercise Activity 4: standing trunk ext ROM to neutral  x 1'  Therapeutic Exercise Activity 5: seated w/ UE on SB: FF x 1', R/L SB x 1'  Therapeutic Exercise Activity 6: side stepping along //bars x 1 min w/ yellow loop  Therapeutic Exercise Activity 7: alt hip ext taps w/ yellow loop x 1'  Therapeutic Exercise Activity 8: sit to stand w/o UE 2 x 5  Therapeutic Exercise Activity 9: HL ALT clam shells R/L x 90 sec  Therapeutic Exercise Activity 10: ball hip add w/ mini bridge x 90 sec  Therapeutic Exercise Activity 11: Pallof press R/L x 1' ea w/ purple cord  Narrow FELICIANO on Airex pad x 1 min w/o UE   DLB on Airex with CW/CCW hip circles x 10 ea  DLB on Airex pad with alt foot " "tap on 8\" stool  x 1 min w/o UE assist as able         Manual Therapy  Manual Therapy Activity 1: PROM B hips as angelica, R knee gentle ext OP  Manual Therapy Activity 2: MFR R/L leg pull with opp knee flexed as angelica  Stretch B HS, ITB, adds, piri/glut, GS; R psoas, ITB  R HF stretch off table x 12' w/ stretch assist   STM R piri/glut, ITB, quad, SI, LS eris                       Assessment:  PT Assessment  Assessment Comment: Pt able to correct for flexed trunk posture well w/ UE support to neutral w/ min LBP, gait also more upright leaving Rx vs entering, R knee pain sx cont to inhibit Rx angelica somewhat and pt following up w/ her ortho surgeon- cont to lack full knee ext- can approximate 0 degrees w/ manual- painful- R hip flexor also very tight    Plan:  OP PT Plan  Onset Date: 07/01/23  Certification Period Start Date: 11/06/23  Certification Period End Date: 02/04/24  Number of Treatments Authorized: V#6/mn  Cont efforts w/ sx mgt of LB/LE, postural control w/ WB/gait, mm re ed for core/LE stability, ROM/flexibility; work and progress within pt angelica   Goals:  Active       PT Problem       PT Goal 1       Start:  11/06/23    Expected End:  02/04/24       STG(4 wks):  - Pt. will have at least 10% increase in flexibility of affected joints/structures being treated- back/LE  - Pt will have improved postural awareness for daily activities and exercises as evidenced by self corrections for improper positions/movements  - Pt. will be able to perform safe and independent transfers/bed mobility with less effort  - Pt will be independent and compliant with HEP  - Pt will have return of at least 50% AROM of lumbar spine         PT Goal 2       Start:  11/06/23    Expected End:  02/04/24         LTG( 10 wks)  - Progress HEP and RONNA within pt tolerance to goal attainment, including return to ADL/IADL and functional activities w/ less pain  - Pt. will have improved balance as evidenced by increased SLS time of at least 10 sec for " improved everyday balance and less fall potential  - Pt. will have safe, independent ambulation without assistive devices for community distances and stairs/curbs with normalized gait pattern and erect posture  - Pt. will have reduction of pain to no greater than 3 of 10 with ex. and activities  - Pt. will have increased strength of at least 1/3 muscle grade in affected muscle groups- core/LB support mm  - Pt. will have at least 10% improvement in functional test score- ELSIE-as evidenced by self report of improved ability to perform every day activities w/ greater ease and less pain  - Pt will be supervised and a gait belt will be used if needed to assure safety in the clinic during treatment                  Maritza Damico, PT

## 2023-12-04 ENCOUNTER — APPOINTMENT (OUTPATIENT)
Dept: PHYSICAL THERAPY | Facility: CLINIC | Age: 68
End: 2023-12-04
Payer: MEDICARE

## 2023-12-06 ENCOUNTER — TREATMENT (OUTPATIENT)
Dept: PHYSICAL THERAPY | Facility: CLINIC | Age: 68
End: 2023-12-06
Payer: MEDICARE

## 2023-12-06 DIAGNOSIS — G89.29 CHRONIC BILATERAL LOW BACK PAIN WITH BILATERAL SCIATICA: ICD-10-CM

## 2023-12-06 DIAGNOSIS — M54.42 CHRONIC BILATERAL LOW BACK PAIN WITH BILATERAL SCIATICA: ICD-10-CM

## 2023-12-06 DIAGNOSIS — M54.41 CHRONIC BILATERAL LOW BACK PAIN WITH BILATERAL SCIATICA: ICD-10-CM

## 2023-12-06 PROCEDURE — 97140 MANUAL THERAPY 1/> REGIONS: CPT | Mod: GP

## 2023-12-06 PROCEDURE — 97110 THERAPEUTIC EXERCISES: CPT | Mod: GP

## 2023-12-06 ASSESSMENT — PAIN DESCRIPTION - DESCRIPTORS: DESCRIPTORS: ACHING

## 2023-12-06 ASSESSMENT — PAIN SCALES - GENERAL: PAINLEVEL_OUTOF10: 4

## 2023-12-06 ASSESSMENT — PAIN - FUNCTIONAL ASSESSMENT: PAIN_FUNCTIONAL_ASSESSMENT: 0-10

## 2023-12-06 NOTE — PROGRESS NOTES
"  Physical Therapy Treatment    Patient Name: Dyana Irene  MRN: [id number]  Today's Date: 12/6/2023  Time Calculation  Start Time: 1102  Stop Time: 1147  Time Calculation (min): 45 min  Current Problem  1. Chronic bilateral low back pain with bilateral sciatica  Follow Up In Physical Therapy          Insurance:  Number of Treatments Authorized: V#7/mn  Certification Period Start Date: 11/06/23  Certification Period End Date: 02/04/24    Subjective   General  General Comment: Pt w/ sx that come and go, overall feeling better      Performing HEP?: partially    Precautions  Precautions  Precautions Comment: mod fall risk  Pain  Pain Assessment: 0-10  Pain Score: 4  Pain Location: Back  Pain Orientation: Lower  Pain Radiating Towards: BLE  Pain Descriptors: Aching    Objective   R hip flexor very tight w/ ROM/stretch, R knee cont to lack full ext w/ pain; R post hip and SI very tender   Able to roll to sit w/o assist     Treatments:  Therapeutic Exercise Activity 1: Scifit LE hills L1 x 6'  Therapeutic Exercise Activity 2: incline g/s stretch x 1 min; HT raises x 1'  Therapeutic Exercise Activity 3: R/L HS stretch x 30\" ea  Therapeutic Exercise Activity 4: standing trunk ext ROM to neutral  x 1'  Therapeutic Exercise Activity 5: seated w/ UE on SB: FF x 1', R/L SB x 1'  Therapeutic Exercise Activity 6: side stepping along //bars x 1 min w/ yellow loop  Therapeutic Exercise Activity 7: alt hip ext taps w/ yellow loop x 1'  Therapeutic Exercise Activity 8: sit to stand w/o UE 2 x 5  Therapeutic Exercise Activity 9: HL ALT clam shells R/L x 2' w/ GTB   Therapeutic Exercise Activity 10: ball hip add w/ mini bridge x 2'  Therapeutic Exercise Activity 11: Pallof press R/L x 10 ea w/ purple cord  Narrow FELICIANO on Airex pad x 1 min w/o UE- reach to R/L across body x 1'  Step onto/off Airex w/o UE support as able x 1'   DLB on Airex pad with alt foot tap onto 8\" stool  x 1 min w/o UE assist as able        Manual Therapy  Manual " Therapy Activity 1: PROM B hips, R knee gentle ext OP as angelica   Manual Therapy Activity 2: MFR R/L leg pull with opp knee flexed as angelica  Stretch B HS, ITB, adds, piri/glut, GS; R psoas, ITB  R HF stretch off table x 12' w/ stretch assist   STM R piri/glut, ITB, quad, SI, LS eris                                     Assessment:  PT Assessment  Assessment Comment: Pt w/ increasing angelica to WB and resistive ex for core and LE, tolerates combination of seated/standing and supine ex w/ min need for rest, balance is improving though pt did need UE support w/ step ups onto Airex; R hip and knee remain tight and restricted w/ hip soft tissues very sensitive during manual; pt making slow/steady gains toward Rx goals    Plan:  OP PT Plan  Onset Date: 07/01/23  Certification Period Start Date: 11/06/23  Certification Period End Date: 02/04/24  Number of Treatments Authorized: V#7/mn  Cont Rx for ROM/flexibility, sx mgt, soft tissue mgt, gait/balance and transfer training, mm re-ed, core stabilization, HEP w/ progression, posture, body mechanics and safety training so that pt can perform daily functional activities w/ less pain, more safely and w/ greater ease   Goals:  Active       PT Problem       PT Goal 1       Start:  11/06/23    Expected End:  02/04/24       STG(4 wks):  - Pt. will have at least 10% increase in flexibility of affected joints/structures being treated- back/LE  - Pt will have improved postural awareness for daily activities and exercises as evidenced by self corrections for improper positions/movements  - Pt. will be able to perform safe and independent transfers/bed mobility with less effort  - Pt will be independent and compliant with HEP  - Pt will have return of at least 50% AROM of lumbar spine         PT Goal 2       Start:  11/06/23    Expected End:  02/04/24         LTG( 10 wks)  - Progress HEP and RONNA within pt tolerance to goal attainment, including return to ADL/IADL and functional activities w/  less pain  - Pt. will have improved balance as evidenced by increased SLS time of at least 10 sec for improved everyday balance and less fall potential  - Pt. will have safe, independent ambulation without assistive devices for community distances and stairs/curbs with normalized gait pattern and erect posture  - Pt. will have reduction of pain to no greater than 3 of 10 with ex. and activities  - Pt. will have increased strength of at least 1/3 muscle grade in affected muscle groups- core/LB support mm  - Pt. will have at least 10% improvement in functional test score- ELSIE-as evidenced by self report of improved ability to perform every day activities w/ greater ease and less pain  - Pt will be supervised and a gait belt will be used if needed to assure safety in the clinic during treatment                  Maritza Damico, PT

## 2023-12-11 ENCOUNTER — TREATMENT (OUTPATIENT)
Dept: PHYSICAL THERAPY | Facility: CLINIC | Age: 68
End: 2023-12-11
Payer: MEDICARE

## 2023-12-11 DIAGNOSIS — G89.29 CHRONIC BILATERAL LOW BACK PAIN WITH BILATERAL SCIATICA: Primary | ICD-10-CM

## 2023-12-11 DIAGNOSIS — M54.41 CHRONIC BILATERAL LOW BACK PAIN WITH BILATERAL SCIATICA: Primary | ICD-10-CM

## 2023-12-11 DIAGNOSIS — M54.42 CHRONIC BILATERAL LOW BACK PAIN WITH BILATERAL SCIATICA: Primary | ICD-10-CM

## 2023-12-11 PROCEDURE — 97140 MANUAL THERAPY 1/> REGIONS: CPT | Mod: GP,CQ,KX

## 2023-12-11 PROCEDURE — 97110 THERAPEUTIC EXERCISES: CPT | Mod: GP,CQ,KX

## 2023-12-11 ASSESSMENT — PAIN - FUNCTIONAL ASSESSMENT: PAIN_FUNCTIONAL_ASSESSMENT: 0-10

## 2023-12-11 ASSESSMENT — PAIN SCALES - GENERAL: PAINLEVEL_OUTOF10: 6

## 2023-12-11 NOTE — PROGRESS NOTES
"  Physical Therapy Treatment    Patient Name: Dyana Irene  MRN: [id number]  Today's Date: 12/11/2023  Time Calculation  Start Time: 1101  Stop Time: 1147  Time Calculation (min): 46 min    Current Problem  1. Chronic bilateral low back pain with bilateral sciatica  Follow Up In Physical Therapy          Insurance:  Number of Treatments Authorized: V#8/mn  Certification Period Start Date: 11/06/23  Certification Period End Date: 02/04/24      Subjective   General  General Comment: PAtient c/o being \"achy\" today.  She notes she sleeps in a recliner and wakes up feeling sore.  She is getting her knees and hips Xrayed and will f/u with Dr. Finney.    Performing HEP?: Partially - every other day    Precautions  Precautions  Precautions Comment: mod fall risk  Pain  Pain Assessment: 0-10  Pain Score: 6  Pain Location: Back  Pain Orientation: Lower    Objective       Treatments:      Therapeutic Exercise  Therapeutic Exercise Activity 1: SciFit [place] L2 x 6 min  Therapeutic Exercise Activity 2: incline g/s stretch x 1 min  Therapeutic Exercise Activity 3: HT raises x 1 min  Therapeutic Exercise Activity 4: R/L HS stretch x 30\" ea  Therapeutic Exercise Activity 5: seated w/ UE on SB: FF x 1 min and R/L x 1 min  Therapeutic Exercise Activity 6: YTB loop R/L hip ABD 2 x 10 ea  Therapeutic Exercise Activity 7: YTB loop R/L hip EXT 2 x 10 ea  Therapeutic Exercise Activity 8: sit to stand with 4# ball 2 x 5  Therapeutic Exercise Activity 9: sitting on 65cm SB - YPP cord rows x 20  Therapeutic Exercise Activity 10: sitting on 65cm SB - YPP cord alt shld ext x 20  Therapeutic Exercise Activity 11: sitting on 65cm SB - pelvic tilts x 2 min and pelvic clocks cw/ccw x 10 ea dir         Manual Therapy  Manual Therapy Activity 1: PROM B hips and knees with gentle OP into R knee  Manual Therapy Activity 2: stretch B HS, gluts, ITB, R psoas  Manual Therapy Activity 3: STM B LS para, SI, gluts                     OP EDUCATION:   "     Assessment:  PT Assessment  Assessment Comment: PAtient has a SB at home which she has used in the past.  She tolerated exercises on the SB well today with increased core activation.  Her LB as were absent after today's session.  Her R knee is most compromised at this time.    Plan:  OP PT Plan  Onset Date: 07/01/23  Certification Period Start Date: 11/06/23  Certification Period End Date: 02/04/24  Number of Treatments Authorized: V#8/mn    Goals:  Active       PT Problem       PT Goal 1       Start:  11/06/23    Expected End:  02/04/24       STG(4 wks):  - Pt. will have at least 10% increase in flexibility of affected joints/structures being treated- back/LE  - Pt will have improved postural awareness for daily activities and exercises as evidenced by self corrections for improper positions/movements  - Pt. will be able to perform safe and independent transfers/bed mobility with less effort  - Pt will be independent and compliant with HEP  - Pt will have return of at least 50% AROM of lumbar spine         PT Goal 2       Start:  11/06/23    Expected End:  02/04/24         LTG( 10 wks)  - Progress HEP and RONNA within pt tolerance to goal attainment, including return to ADL/IADL and functional activities w/ less pain  - Pt. will have improved balance as evidenced by increased SLS time of at least 10 sec for improved everyday balance and less fall potential  - Pt. will have safe, independent ambulation without assistive devices for community distances and stairs/curbs with normalized gait pattern and erect posture  - Pt. will have reduction of pain to no greater than 3 of 10 with ex. and activities  - Pt. will have increased strength of at least 1/3 muscle grade in affected muscle groups- core/LB support mm  - Pt. will have at least 10% improvement in functional test score- LESIE-as evidenced by self report of improved ability to perform every day activities w/ greater ease and less pain  - Pt will be supervised and  a gait belt will be used if needed to assure safety in the clinic during treatment                  Yumiko Rutherford PTA

## 2023-12-12 ENCOUNTER — OFFICE VISIT (OUTPATIENT)
Dept: ORTHOPEDIC SURGERY | Facility: CLINIC | Age: 68
End: 2023-12-12
Payer: MEDICARE

## 2023-12-12 ENCOUNTER — ANCILLARY PROCEDURE (OUTPATIENT)
Dept: RADIOLOGY | Facility: CLINIC | Age: 68
End: 2023-12-12
Payer: MEDICARE

## 2023-12-12 DIAGNOSIS — M25.551 BILATERAL HIP PAIN: ICD-10-CM

## 2023-12-12 DIAGNOSIS — M25.561 PAIN IN BOTH KNEES, UNSPECIFIED CHRONICITY: ICD-10-CM

## 2023-12-12 DIAGNOSIS — M70.51 PES ANSERINUS BURSITIS OF RIGHT KNEE: Primary | ICD-10-CM

## 2023-12-12 DIAGNOSIS — M25.552 BILATERAL HIP PAIN: ICD-10-CM

## 2023-12-12 DIAGNOSIS — M25.562 PAIN IN BOTH KNEES, UNSPECIFIED CHRONICITY: ICD-10-CM

## 2023-12-12 DIAGNOSIS — M25.561 RIGHT KNEE PAIN: ICD-10-CM

## 2023-12-12 PROCEDURE — 72170 X-RAY EXAM OF PELVIS: CPT | Performed by: RADIOLOGY

## 2023-12-12 PROCEDURE — 99213 OFFICE O/P EST LOW 20 MIN: CPT | Performed by: ORTHOPAEDIC SURGERY

## 2023-12-12 PROCEDURE — 1126F AMNT PAIN NOTED NONE PRSNT: CPT | Performed by: ORTHOPAEDIC SURGERY

## 2023-12-12 PROCEDURE — 73564 X-RAY EXAM KNEE 4 OR MORE: CPT | Mod: RIGHT SIDE | Performed by: RADIOLOGY

## 2023-12-12 PROCEDURE — 1159F MED LIST DOCD IN RCRD: CPT | Performed by: ORTHOPAEDIC SURGERY

## 2023-12-12 PROCEDURE — 20610 DRAIN/INJ JOINT/BURSA W/O US: CPT | Performed by: ORTHOPAEDIC SURGERY

## 2023-12-12 PROCEDURE — 72170 X-RAY EXAM OF PELVIS: CPT

## 2023-12-12 PROCEDURE — 1160F RVW MEDS BY RX/DR IN RCRD: CPT | Performed by: ORTHOPAEDIC SURGERY

## 2023-12-12 PROCEDURE — 73564 X-RAY EXAM KNEE 4 OR MORE: CPT | Mod: RT,FY

## 2023-12-12 RX ORDER — TRIAMCINOLONE ACETONIDE 40 MG/ML
80 INJECTION, SUSPENSION INTRA-ARTICULAR; INTRAMUSCULAR
Status: COMPLETED | OUTPATIENT
Start: 2023-12-12 | End: 2023-12-12

## 2023-12-12 RX ORDER — LIDOCAINE HYDROCHLORIDE 20 MG/ML
3 INJECTION, SOLUTION INFILTRATION; PERINEURAL
Status: COMPLETED | OUTPATIENT
Start: 2023-12-12 | End: 2023-12-12

## 2023-12-12 RX ADMIN — TRIAMCINOLONE ACETONIDE 80 MG: 40 INJECTION, SUSPENSION INTRA-ARTICULAR; INTRAMUSCULAR at 10:45

## 2023-12-12 RX ADMIN — LIDOCAINE HYDROCHLORIDE 3 ML: 20 INJECTION, SOLUTION INFILTRATION; PERINEURAL at 10:45

## 2023-12-12 ASSESSMENT — ENCOUNTER SYMPTOMS: KNEE SWELLING: 1

## 2023-12-12 NOTE — PROGRESS NOTES
Subjective    Patient ID: Dyana Irene is a 68 y.o. female.    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 The patient is a 69 y/o  year old F.     COMPARISON:  None.     ACCESSION NUMBER(S):  [id number]     ORDERING CLINICIAN:  EVETTE SMALL     TECHNIQUE:  BONE DENSITY, DEXA 1 OR MORE SITES: AXIAL SKELETN     FINDINGS:  LEFT FEMUR -TOTAL  Bone Mineral Density:1.143 g/cm2.  T-Score 1.1  Z-Score 1.7  % change vs Previous : na. % change vs Baseline baseline.     LEFT FEMUR -NECK  Bone Mineral Density:  1.070 g/cm2.  T-Score 0.2   Z-Score1.1  % change vs Previous : na. % change vs Baseline baseline.     SPINE L1-L4  Bone Mineral Density:1.358 g/cm2.  T-Score 1.5  Z-Score 2.0  % change vs Previous : na. % change vs Baseline baseline.     World Health Organization (WHO) criteria for post-menopausal,   Women:  Normal:         T-score at or above -1 SD  Osteopenia:   T-score between -1 and -2.5 SD  Osteoporosis: T-score at or below -2.5 SD        10-year Fracture Risk:  Major Osteoporotic Fracture  Not reported  Hip Fracture                        Not reported        Impression: According to World Health Organization criteria, classification is  normal.     Follow-up exam is recommended as clinically warranted.     All images and detailed analysis are available on the  Radiology  PACS.     MACRO:  None      Assessment/Plan  appears she developed pes bursitis x-rays look terrific x-rays of her hip do not show any significant arthritic change so we will going to inject this area and see if it will settle it down    L Inj/Asp: R anserine bursa on 12/12/2023 10:45 AM  Indications: pain  Details: 22 G needle, anteromedial approach  Medications: 3 mL lidocaine 20 mg/mL (2 %); 80 mg triamcinolone acetonide 40 mg/mL       Encounter Diagnoses:  Pes anserinus bursitis of right knee    No orders of the defined types were placed in this encounter.    No follow-ups on file.

## 2023-12-13 ENCOUNTER — DOCUMENTATION (OUTPATIENT)
Dept: PHYSICAL THERAPY | Facility: CLINIC | Age: 68
End: 2023-12-13
Payer: MEDICARE

## 2023-12-13 ENCOUNTER — APPOINTMENT (OUTPATIENT)
Dept: PHYSICAL THERAPY | Facility: CLINIC | Age: 68
End: 2023-12-13
Payer: MEDICARE

## 2023-12-13 NOTE — PROGRESS NOTES
"  Physical Therapy Treatment    Patient Name: Dyana Irene  MRN: [id number]  Today's Date: 12/13/2023     Current Problem  No diagnosis found.    Insurance:             Subjective   General           Performing HEP?: {Yes/No:34885}    Precautions     Pain       Objective   Posture Comment: FWD posture in standing, no lumbar lordosis, no shift     Range of Motion Comments: LB ext 0 w/ pain, flex 70 w/ pain, R SB very painful to 15, L SB mild pain to 20; all ROM of R hip very pain to R LB     Strength Comments: core 3/5- pain; R hip flex 4-/5, L 4/5; R hip abd 3+/5, L 4-/5; B hip add 4/5 pt can 1/2 bridge trunk w/ pain     Flexibility Comment: R HS -40, L -35, R piri 50%, L 25%; R ITB 25%, L 10%     Palpation Comment: Pt very hypersensitive to palpation; R LS, piri/glut, ITB > L very tender   Special Tests Comment: (-) slump; (-) SLR  Functional Assessments:  Gait Comment: Pt walks w/ flexed trunk, slow gait pattern w/o devices  , Balance Comment: Unable to SLS either LE     , Bed Mobility Comment: rolling and supine to sit are very slow and painful  , and Transfers Comment: relies on UE for sit to stand     Outcome Measures:  Other Measures  Oswestry Disablity Index (ELSIE): 46%      Outcome Measures:  {PT Outcome Measures:89376}    Treatments:  Therapeutic Exercise  Therapeutic Exercise Activity 1: SciFit [place] L2 x 6 min  Therapeutic Exercise Activity 2: incline g/s stretch x 1 min  Therapeutic Exercise Activity 3: HT raises x 1 min  Therapeutic Exercise Activity 4: R/L HS stretch x 30\" ea  Therapeutic Exercise Activity 5: seated w/ UE on SB: FF x 1 min and R/L x 1 min  Therapeutic Exercise Activity 6: YTB loop R/L hip ABD 2 x 10 ea  Therapeutic Exercise Activity 7: YTB loop R/L hip EXT 2 x 10 ea  Therapeutic Exercise Activity 8: sit to stand with 4# ball 2 x 5  Therapeutic Exercise Activity 9: sitting on 65cm SB - YPP cord rows x 20  Therapeutic Exercise Activity 10: sitting on 65cm SB - YPP cord alt shld ext x " 20  Therapeutic Exercise Activity 11: sitting on 65cm SB - pelvic tilts x 2 min and pelvic clocks cw/ccw x 10 ea dir        Manual Therapy  Manual Therapy Activity 1: PROM B hips and knees with gentle OP into R knee  Manual Therapy Activity 2: stretch B HS, gluts, ITB, R psoas  Manual Therapy Activity 3: STM B LS para, SI, gluts                                   OP EDUCATION:       Assessment:       Plan:       Goals:  Active       PT Problem       PT Goal 1       Start:  11/06/23    Expected End:  02/04/24       STG(4 wks):  - Pt. will have at least 10% increase in flexibility of affected joints/structures being treated- back/LE  - Pt will have improved postural awareness for daily activities and exercises as evidenced by self corrections for improper positions/movements  - Pt. will be able to perform safe and independent transfers/bed mobility with less effort  - Pt will be independent and compliant with HEP  - Pt will have return of at least 50% AROM of lumbar spine         PT Goal 2       Start:  11/06/23    Expected End:  02/04/24         LTG( 10 wks)  - Progress HEP and RONNA within pt tolerance to goal attainment, including return to ADL/IADL and functional activities w/ less pain  - Pt. will have improved balance as evidenced by increased SLS time of at least 10 sec for improved everyday balance and less fall potential  - Pt. will have safe, independent ambulation without assistive devices for community distances and stairs/curbs with normalized gait pattern and erect posture  - Pt. will have reduction of pain to no greater than 3 of 10 with ex. and activities  - Pt. will have increased strength of at least 1/3 muscle grade in affected muscle groups- core/LB support mm  - Pt. will have at least 10% improvement in functional test score- ELSIE-as evidenced by self report of improved ability to perform every day activities w/ greater ease and less pain  - Pt will be supervised and a gait belt will be used if needed  to assure safety in the clinic during treatment                  Maritza Damico PT

## 2023-12-13 NOTE — PROGRESS NOTES
cancelPhysical Therapy                 Therapy Communication Note    Patient Name: Dyana Irene  MRN: [id number]  Today's Date: 12/13/2023     Discipline: Physical Therapy    Missed Visit Reason:      Missed Time: Cancel    Comment:  Per message, pt needs emergency dental surgery

## 2023-12-18 ENCOUNTER — APPOINTMENT (OUTPATIENT)
Dept: PHYSICAL THERAPY | Facility: CLINIC | Age: 68
End: 2023-12-18
Payer: MEDICARE

## 2023-12-20 ENCOUNTER — APPOINTMENT (OUTPATIENT)
Dept: PHYSICAL THERAPY | Facility: CLINIC | Age: 68
End: 2023-12-20
Payer: MEDICARE

## 2023-12-26 ENCOUNTER — TREATMENT (OUTPATIENT)
Dept: PHYSICAL THERAPY | Facility: CLINIC | Age: 68
End: 2023-12-26
Payer: MEDICARE

## 2023-12-26 DIAGNOSIS — M54.42 CHRONIC BILATERAL LOW BACK PAIN WITH BILATERAL SCIATICA: ICD-10-CM

## 2023-12-26 DIAGNOSIS — M54.41 CHRONIC BILATERAL LOW BACK PAIN WITH BILATERAL SCIATICA: ICD-10-CM

## 2023-12-26 DIAGNOSIS — G89.29 CHRONIC BILATERAL LOW BACK PAIN WITH BILATERAL SCIATICA: ICD-10-CM

## 2023-12-26 PROCEDURE — 97110 THERAPEUTIC EXERCISES: CPT | Mod: GP,KX

## 2023-12-26 PROCEDURE — 97140 MANUAL THERAPY 1/> REGIONS: CPT | Mod: GP,KX

## 2023-12-26 ASSESSMENT — PAIN - FUNCTIONAL ASSESSMENT: PAIN_FUNCTIONAL_ASSESSMENT: 0-10

## 2023-12-26 ASSESSMENT — PAIN DESCRIPTION - DESCRIPTORS: DESCRIPTORS: ACHING

## 2023-12-26 ASSESSMENT — PAIN SCALES - GENERAL: PAINLEVEL_OUTOF10: 4

## 2023-12-26 NOTE — PROGRESS NOTES
"  Physical Therapy Treatment/Re Check    Patient Name: Dyana Irene  MRN: [id number]  Today's Date: 12/26/2023  Time Calculation  Start Time: 1244  Stop Time: 1332  Time Calculation (min): 48 min  Current Problem  1. Chronic bilateral low back pain with bilateral sciatica  Follow Up In Physical Therapy          Insurance:  Number of Treatments Authorized: V#9/mn- re check (2 x /wk x 2 more weeks, then 1x/wk)  Certification Period Start Date: 11/06/23  Certification Period End Date: 02/04/24    Subjective   General  General Comment: Pt reports she saw her ortho- \"the hips and knees look ok\"- did get and injection in her R knee ofr \"bursitis\"- still hurting; back has been a little bit better past week or so; still feels weak in the legs, no falls or near falls since SOC        Performing HEP?: Yes    Precautions  Precautions  Precautions Comment: mod fall risk  Pain  Pain Assessment: 0-10  Pain Score: 4  Pain Location: Back  Pain Orientation: Lower  Pain Radiating Towards: BLE  Pain Descriptors: Aching (more time on feet causes increasing pain)    Objective   Posture Comment: FWD posture in standing, no lumbar lordosis, no shift- pt can now correct to neutral w/o pain up to 3-4 mins     Range of Motion Comments: LB ext 5 w/ pain, flex 75 w/o pain, R SB very painful to 15, L/R SB mild pain to 20; R hip ROM no longer causes increased pain R LB; R knee lacks full ext- per pt ortho MD not concerned     Strength Comments: core 3+/5- pain; R hip flex 4/5, L 4+/5; R hip abd 3+/5, L 4-/5; B hip add 4/5 pt can fully bridge trunk w/ pain     Flexibility Comment: R HS -30, L -20, R piri 25%, L 10%; R ITB 10%     Palpation Comment: Pt very hypersensitive to palpation; R LS, piri/glut, ITB > L tender   R medial knee distal to joint line also very tender  Special Tests Comment: (-) slump; (-) SLR  Functional Assessments:  Gait Comment: Pt walks w/ flexed trunk, slow gait pattern w/o devices- can stand/walk erect for short " "distances w/ reminder   , Balance Comment: SLS R = 3 sec, L = 8 sec     , Bed Mobility Comment: pt w/ less effort to roll, perform supine to sit- remains painful  , and Transfers Comment: pt can perform repeated sit to stand w/o use of UE      Outcome Measures:  Other Measures  Oswestry Disablity Index (ELSIE): 38%    Treatments:  Therapeutic Exercise  Therapeutic Exercise Activity 1: SciFit [place] L2 x 5 min  Therapeutic Exercise Activity 2: incline g/s stretch x 1 min  Therapeutic Exercise Activity 3: HT raises x 1 min  Therapeutic Exercise Activity 4: R/L HS stretch x 30\" ea  Therapeutic Exercise Activity 5: seated w/ UE on SB: FF x 1 min and R/L SB x 1 min  Therapeutic Exercise Activity 6: YTB loop R/L hip ABD 2 x 10 ea  Therapeutic Exercise Activity 7: YTB loop R/L hip EXT 2 x 10 ea  Therapeutic Exercise Activity 8: sit to stand with 4# ball 2 x 5  Therapeutic Exercise Activity 9: Airex DLB x 1' w/o UE; march and tap stool x 10 ea R/L   Therapeutic Exercise Activity 10: HL march w/ TA x 2'  Therapeutic Exercise Activity 11: mini bridge over bolster 5 sec x 2'        Manual Therapy  Manual Therapy Activity 1: PROM B hips and knees- hold OP R knee   Manual Therapy Activity 2: stretch B HS, gluts, ITB, R psoas  Manual Therapy Activity 3: STM B LS para, SI, gluts, ITB                                 OP EDUCATION:     Cont HEP- will progress to standing resistance ex as pt nagelica   Assessment:  PT Assessment  Assessment Comment: Pt has made gains in ROM/flexibility and strength, she has had R knee pain sx and sought ortho input- per pt her hips and knees are ok- sx primarily coming from her back, ER knee lascksd full ext though will lessen efforts to imrove this as pt having increased pain; pt w/ improved ex angelica, balance better and pt able to walk more erect if reminded; R>L LS area remains painful if pt is on her feet too long and strength cont to be diminished in support mm- josee on R; Rx is indicated to cont to attain " goals and further improve pt's functional status    Plan:  OP PT Plan  PT Plan: Skilled PT  Duration: 10 weeks  Onset Date: 07/01/23  Certification Period Start Date: 11/06/23  Certification Period End Date: 02/04/24  Number of Treatments Authorized: V#9/mn- re check (2 x /wk x 2 more weeks, then 1x/wk)  Cont Rx for ROM/flexibility, sx mgt, soft tissue mgt, gait/balance and transfer training, mm re-ed, core stabilization, HEP w/ progression, posture, body mechanics and safety training so that pt can perform daily functional activities w/ less pain, more safely and w/ greater ease   Goals:  Active       PT Problem       PT Goal 1 (Progressing)       Start:  11/06/23    Expected End:  02/04/24       STG(4 wks):  - Pt. will have at least 10% increase in flexibility of affected joints/structures being treated- back/LE- met  - Pt will have improved postural awareness for daily activities and exercises as evidenced by self corrections for improper positions/movements- progressing  - Pt. will be able to perform safe and independent transfers/bed mobility with less effort- met/progressing  - Pt will be independent and compliant with HEP- met  - Pt will have return of at least 50% AROM of lumbar spine- progressing         PT Goal 2 (Progressing)       Start:  11/06/23    Expected End:  02/04/24         LTG( 10 wks)  - Progress HEP and RONNA within pt tolerance to goal attainment, including return to ADL/IADL and functional activities w/ less pain  - Pt. will have improved balance as evidenced by increased SLS time of at least 10 sec for improved everyday balance and less fall potential  - Pt. will have safe, independent ambulation without assistive devices for community distances and stairs/curbs with normalized gait pattern and erect posture  - Pt. will have reduction of pain to no greater than 3 of 10 with ex. and activities  - Pt. will have increased strength of at least 1/3 muscle grade in affected muscle groups- core/LB  support mm  - Pt. will have at least 10% improvement in functional test score- ELSIE-as evidenced by self report of improved ability to perform every day activities w/ greater ease and less pain  - Pt will be supervised and a gait belt will be used if needed to assure safety in the clinic during treatment                  Maritza Damico PT

## 2023-12-28 ENCOUNTER — APPOINTMENT (OUTPATIENT)
Dept: PHYSICAL THERAPY | Facility: CLINIC | Age: 68
End: 2023-12-28
Payer: MEDICARE

## 2023-12-30 DIAGNOSIS — E78.5 HYPERLIPIDEMIA, UNSPECIFIED: ICD-10-CM

## 2023-12-30 DIAGNOSIS — F51.01 PRIMARY INSOMNIA: ICD-10-CM

## 2023-12-30 DIAGNOSIS — E78.5 HYPERLIPIDEMIA, UNSPECIFIED HYPERLIPIDEMIA TYPE: Primary | ICD-10-CM

## 2023-12-30 DIAGNOSIS — I10 ESSENTIAL (PRIMARY) HYPERTENSION: ICD-10-CM

## 2024-01-02 RX ORDER — EZETIMIBE 10 MG/1
10 TABLET ORAL DAILY
Qty: 90 TABLET | Refills: 3 | Status: SHIPPED | OUTPATIENT
Start: 2024-01-02

## 2024-01-02 RX ORDER — ATORVASTATIN CALCIUM 80 MG/1
80 TABLET, FILM COATED ORAL DAILY
Qty: 90 TABLET | Refills: 1 | Status: SHIPPED | OUTPATIENT
Start: 2024-01-02

## 2024-01-02 RX ORDER — METOPROLOL SUCCINATE 25 MG/1
25 TABLET, EXTENDED RELEASE ORAL DAILY
Qty: 90 TABLET | Refills: 1 | Status: SHIPPED | OUTPATIENT
Start: 2024-01-02 | End: 2024-05-28

## 2024-01-02 RX ORDER — DULOXETIN HYDROCHLORIDE 60 MG/1
60 CAPSULE, DELAYED RELEASE ORAL DAILY
Qty: 90 CAPSULE | Refills: 1 | Status: SHIPPED | OUTPATIENT
Start: 2024-01-02 | End: 2024-05-28

## 2024-01-03 ENCOUNTER — APPOINTMENT (OUTPATIENT)
Dept: CARDIOLOGY | Facility: HOSPITAL | Age: 69
End: 2024-01-03
Payer: MEDICARE

## 2024-01-03 ENCOUNTER — HOSPITAL ENCOUNTER (EMERGENCY)
Facility: HOSPITAL | Age: 69
Discharge: HOME | End: 2024-01-03
Attending: STUDENT IN AN ORGANIZED HEALTH CARE EDUCATION/TRAINING PROGRAM
Payer: MEDICARE

## 2024-01-03 ENCOUNTER — TELEPHONE (OUTPATIENT)
Dept: PRIMARY CARE | Facility: CLINIC | Age: 69
End: 2024-01-03
Payer: MEDICARE

## 2024-01-03 ENCOUNTER — APPOINTMENT (OUTPATIENT)
Dept: RADIOLOGY | Facility: CLINIC | Age: 69
End: 2024-01-03
Payer: MEDICARE

## 2024-01-03 ENCOUNTER — APPOINTMENT (OUTPATIENT)
Dept: RADIOLOGY | Facility: HOSPITAL | Age: 69
End: 2024-01-03
Payer: MEDICARE

## 2024-01-03 VITALS
OXYGEN SATURATION: 97 % | DIASTOLIC BLOOD PRESSURE: 80 MMHG | RESPIRATION RATE: 17 BRPM | WEIGHT: 239 LBS | HEART RATE: 68 BPM | TEMPERATURE: 97 F | HEIGHT: 66 IN | SYSTOLIC BLOOD PRESSURE: 126 MMHG | BODY MASS INDEX: 38.41 KG/M2

## 2024-01-03 DIAGNOSIS — R06.02 SHORTNESS OF BREATH: Primary | ICD-10-CM

## 2024-01-03 LAB
ALBUMIN SERPL BCP-MCNC: 3.7 G/DL (ref 3.4–5)
ALP SERPL-CCNC: 54 U/L (ref 33–136)
ALT SERPL W P-5'-P-CCNC: 26 U/L (ref 7–45)
ANION GAP SERPL CALC-SCNC: 12 MMOL/L (ref 10–20)
AST SERPL W P-5'-P-CCNC: 27 U/L (ref 9–39)
BASOPHILS # BLD AUTO: 0.03 X10*3/UL (ref 0–0.1)
BASOPHILS NFR BLD AUTO: 0.4 %
BILIRUB SERPL-MCNC: 0.7 MG/DL (ref 0–1.2)
BNP SERPL-MCNC: 55 PG/ML (ref 0–99)
BUN SERPL-MCNC: 18 MG/DL (ref 6–23)
CALCIUM SERPL-MCNC: 9.4 MG/DL (ref 8.6–10.3)
CARDIAC TROPONIN I PNL SERPL HS: 14 NG/L (ref 0–13)
CARDIAC TROPONIN I PNL SERPL HS: 15 NG/L (ref 0–13)
CHLORIDE SERPL-SCNC: 103 MMOL/L (ref 98–107)
CO2 SERPL-SCNC: 32 MMOL/L (ref 21–32)
CREAT SERPL-MCNC: 0.97 MG/DL (ref 0.5–1.05)
EOSINOPHIL # BLD AUTO: 0.13 X10*3/UL (ref 0–0.7)
EOSINOPHIL NFR BLD AUTO: 1.5 %
ERYTHROCYTE [DISTWIDTH] IN BLOOD BY AUTOMATED COUNT: 13.2 % (ref 11.5–14.5)
FLUAV RNA RESP QL NAA+PROBE: NOT DETECTED
FLUBV RNA RESP QL NAA+PROBE: NOT DETECTED
GFR SERPL CREATININE-BSD FRML MDRD: 63 ML/MIN/1.73M*2
GLUCOSE SERPL-MCNC: 90 MG/DL (ref 74–99)
HCT VFR BLD AUTO: 47.2 % (ref 36–46)
HGB BLD-MCNC: 15.6 G/DL (ref 12–16)
IMM GRANULOCYTES # BLD AUTO: 0.02 X10*3/UL (ref 0–0.7)
IMM GRANULOCYTES NFR BLD AUTO: 0.2 % (ref 0–0.9)
LYMPHOCYTES # BLD AUTO: 2.47 X10*3/UL (ref 1.2–4.8)
LYMPHOCYTES NFR BLD AUTO: 29.4 %
MCH RBC QN AUTO: 29.8 PG (ref 26–34)
MCHC RBC AUTO-ENTMCNC: 33.1 G/DL (ref 32–36)
MCV RBC AUTO: 90 FL (ref 80–100)
MONOCYTES # BLD AUTO: 0.71 X10*3/UL (ref 0.1–1)
MONOCYTES NFR BLD AUTO: 8.5 %
NEUTROPHILS # BLD AUTO: 5.03 X10*3/UL (ref 1.2–7.7)
NEUTROPHILS NFR BLD AUTO: 60 %
NRBC BLD-RTO: 0 /100 WBCS (ref 0–0)
PLATELET # BLD AUTO: 186 X10*3/UL (ref 150–450)
POTASSIUM SERPL-SCNC: 4 MMOL/L (ref 3.5–5.3)
PROT SERPL-MCNC: 7.3 G/DL (ref 6.4–8.2)
RBC # BLD AUTO: 5.24 X10*6/UL (ref 4–5.2)
RSV RNA RESP QL NAA+PROBE: NOT DETECTED
SARS-COV-2 RNA RESP QL NAA+PROBE: NOT DETECTED
SODIUM SERPL-SCNC: 143 MMOL/L (ref 136–145)
WBC # BLD AUTO: 8.4 X10*3/UL (ref 4.4–11.3)

## 2024-01-03 PROCEDURE — 99284 EMERGENCY DEPT VISIT MOD MDM: CPT | Performed by: STUDENT IN AN ORGANIZED HEALTH CARE EDUCATION/TRAINING PROGRAM

## 2024-01-03 PROCEDURE — 36415 COLL VENOUS BLD VENIPUNCTURE: CPT | Performed by: STUDENT IN AN ORGANIZED HEALTH CARE EDUCATION/TRAINING PROGRAM

## 2024-01-03 PROCEDURE — 2500000004 HC RX 250 GENERAL PHARMACY W/ HCPCS (ALT 636 FOR OP/ED): Performed by: STUDENT IN AN ORGANIZED HEALTH CARE EDUCATION/TRAINING PROGRAM

## 2024-01-03 PROCEDURE — 2500000001 HC RX 250 WO HCPCS SELF ADMINISTERED DRUGS (ALT 637 FOR MEDICARE OP): Performed by: STUDENT IN AN ORGANIZED HEALTH CARE EDUCATION/TRAINING PROGRAM

## 2024-01-03 PROCEDURE — 71045 X-RAY EXAM CHEST 1 VIEW: CPT

## 2024-01-03 PROCEDURE — 99285 EMERGENCY DEPT VISIT HI MDM: CPT | Mod: 25

## 2024-01-03 PROCEDURE — 93005 ELECTROCARDIOGRAM TRACING: CPT

## 2024-01-03 PROCEDURE — 84484 ASSAY OF TROPONIN QUANT: CPT | Performed by: STUDENT IN AN ORGANIZED HEALTH CARE EDUCATION/TRAINING PROGRAM

## 2024-01-03 PROCEDURE — 2550000001 HC RX 255 CONTRASTS: Performed by: STUDENT IN AN ORGANIZED HEALTH CARE EDUCATION/TRAINING PROGRAM

## 2024-01-03 PROCEDURE — 84075 ASSAY ALKALINE PHOSPHATASE: CPT | Performed by: STUDENT IN AN ORGANIZED HEALTH CARE EDUCATION/TRAINING PROGRAM

## 2024-01-03 PROCEDURE — 94640 AIRWAY INHALATION TREATMENT: CPT

## 2024-01-03 PROCEDURE — 96361 HYDRATE IV INFUSION ADD-ON: CPT

## 2024-01-03 PROCEDURE — 2500000002 HC RX 250 W HCPCS SELF ADMINISTERED DRUGS (ALT 637 FOR MEDICARE OP, ALT 636 FOR OP/ED): Performed by: STUDENT IN AN ORGANIZED HEALTH CARE EDUCATION/TRAINING PROGRAM

## 2024-01-03 PROCEDURE — 71275 CT ANGIOGRAPHY CHEST: CPT

## 2024-01-03 PROCEDURE — 96374 THER/PROPH/DIAG INJ IV PUSH: CPT

## 2024-01-03 PROCEDURE — 85025 COMPLETE CBC W/AUTO DIFF WBC: CPT | Performed by: STUDENT IN AN ORGANIZED HEALTH CARE EDUCATION/TRAINING PROGRAM

## 2024-01-03 PROCEDURE — 83880 ASSAY OF NATRIURETIC PEPTIDE: CPT | Performed by: STUDENT IN AN ORGANIZED HEALTH CARE EDUCATION/TRAINING PROGRAM

## 2024-01-03 PROCEDURE — 71275 CT ANGIOGRAPHY CHEST: CPT | Performed by: STUDENT IN AN ORGANIZED HEALTH CARE EDUCATION/TRAINING PROGRAM

## 2024-01-03 PROCEDURE — 87637 SARSCOV2&INF A&B&RSV AMP PRB: CPT | Performed by: STUDENT IN AN ORGANIZED HEALTH CARE EDUCATION/TRAINING PROGRAM

## 2024-01-03 PROCEDURE — 71045 X-RAY EXAM CHEST 1 VIEW: CPT | Performed by: RADIOLOGY

## 2024-01-03 RX ORDER — NAPROXEN SODIUM 220 MG/1
324 TABLET, FILM COATED ORAL ONCE
Status: COMPLETED | OUTPATIENT
Start: 2024-01-03 | End: 2024-01-03

## 2024-01-03 RX ORDER — IPRATROPIUM BROMIDE AND ALBUTEROL SULFATE 2.5; .5 MG/3ML; MG/3ML
3 SOLUTION RESPIRATORY (INHALATION)
Status: DISCONTINUED | OUTPATIENT
Start: 2024-01-03 | End: 2024-01-03 | Stop reason: HOSPADM

## 2024-01-03 RX ORDER — AZITHROMYCIN 500 MG/1
500 TABLET, FILM COATED ORAL DAILY
Qty: 3 TABLET | Refills: 0 | Status: SHIPPED | OUTPATIENT
Start: 2024-01-03 | End: 2024-01-06

## 2024-01-03 RX ORDER — PREDNISONE 20 MG/1
40 TABLET ORAL DAILY
Qty: 8 TABLET | Refills: 0 | Status: SHIPPED | OUTPATIENT
Start: 2024-01-03 | End: 2024-01-07

## 2024-01-03 RX ADMIN — IPRATROPIUM BROMIDE AND ALBUTEROL SULFATE 3 ML: 2.5; .5 SOLUTION RESPIRATORY (INHALATION) at 18:55

## 2024-01-03 RX ADMIN — METHYLPREDNISOLONE SODIUM SUCCINATE 125 MG: 125 INJECTION, POWDER, FOR SOLUTION INTRAMUSCULAR; INTRAVENOUS at 19:34

## 2024-01-03 RX ADMIN — ASPIRIN 81 MG 243 MG: 81 TABLET ORAL at 17:19

## 2024-01-03 RX ADMIN — IOHEXOL 90 ML: 350 INJECTION, SOLUTION INTRAVENOUS at 17:51

## 2024-01-03 RX ADMIN — SODIUM CHLORIDE, POTASSIUM CHLORIDE, SODIUM LACTATE AND CALCIUM CHLORIDE 1000 ML: 600; 310; 30; 20 INJECTION, SOLUTION INTRAVENOUS at 17:18

## 2024-01-03 ASSESSMENT — COLUMBIA-SUICIDE SEVERITY RATING SCALE - C-SSRS
1. IN THE PAST MONTH, HAVE YOU WISHED YOU WERE DEAD OR WISHED YOU COULD GO TO SLEEP AND NOT WAKE UP?: NO
2. HAVE YOU ACTUALLY HAD ANY THOUGHTS OF KILLING YOURSELF?: NO
6. HAVE YOU EVER DONE ANYTHING, STARTED TO DO ANYTHING, OR PREPARED TO DO ANYTHING TO END YOUR LIFE?: NO

## 2024-01-03 ASSESSMENT — PAIN DESCRIPTION - DESCRIPTORS
DESCRIPTORS: PRESSURE;SQUEEZING
DESCRIPTORS: PRESSURE

## 2024-01-03 ASSESSMENT — PAIN SCALES - GENERAL
PAINLEVEL_OUTOF10: 6
PAINLEVEL_OUTOF10: 8
PAINLEVEL_OUTOF10: 8

## 2024-01-03 ASSESSMENT — PAIN DESCRIPTION - PAIN TYPE: TYPE: ACUTE PAIN

## 2024-01-03 ASSESSMENT — LIFESTYLE VARIABLES
REASON UNABLE TO ASSESS: YES
HAVE PEOPLE ANNOYED YOU BY CRITICIZING YOUR DRINKING: NO
EVER FELT BAD OR GUILTY ABOUT YOUR DRINKING: NO
HAVE YOU EVER FELT YOU SHOULD CUT DOWN ON YOUR DRINKING: NO
EVER HAD A DRINK FIRST THING IN THE MORNING TO STEADY YOUR NERVES TO GET RID OF A HANGOVER: NO

## 2024-01-03 ASSESSMENT — PAIN DESCRIPTION - LOCATION
LOCATION: CHEST
LOCATION: CHEST

## 2024-01-03 ASSESSMENT — PAIN DESCRIPTION - FREQUENCY: FREQUENCY: CONSTANT/CONTINUOUS

## 2024-01-03 ASSESSMENT — PAIN - FUNCTIONAL ASSESSMENT
PAIN_FUNCTIONAL_ASSESSMENT: 0-10
PAIN_FUNCTIONAL_ASSESSMENT: 0-10

## 2024-01-03 NOTE — ED PROVIDER NOTES
CC: Cough and Shortness of Breath     HPI:  Patient is a 68-year-old female presents to the emergency department with cough and shortness of breath.  She also has associated chest pain.  Patient states the cough is relatively nonproductive in nature.  She states occasionally she will have some white clear sputum.  She denies fever.  States that she has a history of emphysema that seems to be exacerbated when she gets sick.  Patient also has a history of blood clots that were provoked after surgery years ago.  Patient states though she feels very similar to her prior blood clot in the past.  She denies any hemoptysis.  Patient denies any known sick symptoms.  On review of system patient also reports diarrhea.  She states she has had diarrhea for the last week.  No recent antibiotic use.    Records Reviewed:  Recent available ED and inpatient notes reviewed in EMR.    PMHx/PSHx:  Per HPI.   - has a past medical history of Acute bronchitis, unspecified, Acute eczematoid otitis externa, left ear, Acute upper respiratory infection, unspecified, Aftercare following joint replacement surgery, Body mass index (BMI)40.0-44.9, adult, COVID-19, Displaced fracture of proximal phalanx of unspecified finger, initial encounter for closed fracture, Diverticulitis of intestine, part unspecified, without perforation or abscess without bleeding, Encounter for screening for malignant neoplasm of colon, Localized swelling, mass and lump, left lower limb, Neuralgia and neuritis, unspecified, Non-pressure chronic ulcer of skin of other sites limited to breakdown of skin (CMS/HCC), Osteoarthritis of knee, unspecified, Other abnormal glucose, Other acute sinusitis, Other chest pain, Other conditions influencing health status, Other microscopic hematuria, Other postherpetic nervous system involvement, Other specified cough, Other specified cough, Otitis media, unspecified, bilateral, Pain in left leg, Pain in leg, unspecified, Pain in right  hip, Pain in unspecified knee, Pelvic and perineal pain, Personal history of other diseases of the circulatory system, Personal history of other diseases of the digestive system, Personal history of other diseases of the nervous system and sense organs, Personal history of other diseases of the respiratory system, Personal history of other diseases of the respiratory system, Personal history of other diseases of the respiratory system, Personal history of other diseases of the respiratory system, Personal history of other endocrine, nutritional and metabolic disease, Personal history of other endocrine, nutritional and metabolic disease, Personal history of other endocrine, nutritional and metabolic disease, Personal history of other infectious and parasitic diseases, Personal history of other malignant neoplasm of skin, Personal history of other mental and behavioral disorders, Personal history of other specified conditions, Personal history of other specified conditions, Personal history of other specified conditions, Personal history of other specified conditions, Personal history of other specified conditions, Personal history of other specified conditions, Personal history of other specified conditions, Personal history of other specified conditions, Personal history of other specified conditions, Personal history of pulmonary embolism, Phlebitis and thrombophlebitis of unspecified site, Primary osteoarthritis, right hand, Primary osteoarthritis, unspecified hand, Solitary pulmonary nodule, Sprain of unspecified part of unspecified wrist and hand, initial encounter, Trigger finger, unspecified finger, Trigger finger, unspecified finger, Unilateral primary osteoarthritis, right knee, Unilateral primary osteoarthritis, unspecified knee, Urethral disorder, unspecified, Vitamin deficiency, unspecified, and Zoster without complications.  - has a past surgical history that includes Gallbladder surgery (10/25/2013);  Hysterectomy (10/25/2013); Other surgical history (11/16/2022); Other surgical history (04/06/2022); Other surgical history (04/06/2022); CT angio neck (4/26/2023); and CT angio head w and wo IV contrast (4/26/2023).  - has Squamous cell carcinoma of skin of lower limb, including hip; Osteoarthrosis, localized, primary, knee, right; Obstructive sleep apnea; Obesity with body mass index 30 or greater; Mixed stress and urge urinary incontinence; Mitral valve regurgitation; Lung nodules; Lumbosacral spondylosis; Kidney cyst, acquired; Impaired fasting glucose; Insomnia; Mixed hyperlipidemia; Hyperlipidemia; Acquired hyperlipoproteinemia; History of deep vein thrombosis; Hematuria; Fibromyalgia; Enlarged liver; Diverticulosis; Disc degeneration, lumbar; Elevated coronary artery calcium score; Coronary artery disease involving native coronary artery without angina pectoris; Arteriosclerosis of both carotid arteries; Continuous dependence on cigarette smoking; Benign essential hypertension; Atrophic emphysema (CMS/HCC); Calcification of aortic valve; Aortic valve sclerosis; Anxiety; Altered mental status; Abdominal pain; History of total right knee replacement; and Chronic bilateral low back pain with bilateral sciatica on their problem list.    Medications:  Reviewed in EMR. See EMR for complete list of medications and doses.    Allergies:  Hydromorphone, Nitrofurantoin macrocrystal, Nirmatrelvir-ritonavir, Nitrofurantoin, and Valdecoxib    Social History:  - Tobacco:  has no history on file for tobacco use.   - Alcohol:  has no history on file for alcohol use.   - Illicit Drugs:  has no history on file for drug use.     ROS:  Per HPI.       ???????????????????????????????????????????????????????????????  Triage Vitals:  T 36.1 °C (97 °F)  HR 74  /64  RR 18  O2 93 % None (Room air)    Physical Exam  ???????????????????????????????????????????????????????????????  GEN: well appearing, no acute distress  HEAD:  atraumatic  EYES: PERRL, EOMI, no scleral icterus  ENT: mmm, no rhinorrhea, uvula midline  NECK: no JVD  CVS/CHEST: reg rate, nl rhythm  PULM: CTA b/l no wheezes, crackles, or rhonchi.  Nonlabored breathing.  Appear relatively clear.  94% on my assessment.  GI: soft, NT/ND, no rebound or guarding   EXT: no LE edema, 2+ periph pulses in bilat radial and DP   NEURO: Awake and alert, Strength and sensation is equal in b/l upper and lower extremities, normal ambulation, no focal sensory deficits  SKIN: warm, dry, no rashes or ulcerations  PSYCH: AAOx3 answers questions appropriately    EKG:  EKG read by me reviewed by me is normal sinus rhythm at 67 bpm.  Normal axis.  Normal intervals.  No ST segment elevation or depression.  T wave inversions noted in inferior leads.    Assessment and Plan:  Patient is a 68-year-old female presents emergency department chest pain shortness of breath.  Mildly elevated troponin.  has an associated cough.  May be heart failure versus infectious etiology given the associated diarrhea.  Sent for COVID and flu.  Initial troponin slightly elevated.  EKG shows no ST segment elevation but does have T wave inversions noted in inferior leads.  Given her history of pulmonary embolism a CT PE obtained.  Patient given 324 of aspirin.  Disposition pending workup.  Troponin down trended.  Appears to be viral in nature.  CT shows groundglass opacities but negative for pulmonary embolism.  Patient treated with DuoNeb and Solu-Medrol and feeling significantly better.  Maintain saturations above 93% with a walking pulse ox in the emergency department.  Written for prednisone, azithromycin and albuterol as an outpatient.  Encouraged to take over-the-counter Mucinex and return if symptoms worsen.  Patient expressed understanding and is agreeable with the plan.    Social Determinants Limiting Care:  None identified    Disposition:  Discharged in stable condition with return precautions    Karey Barker  DO      Procedures ? [employer] last updated 1/3/2024 4:49 PM        Karey Barker,   01/03/24 2234       Karey Barker,   01/03/24 2234

## 2024-01-03 NOTE — ED TRIAGE NOTES
Been sick w/ URI sx and progressively worsening SOB x 1 week. Feels like she can't get a full breath. NP, moist cough. BIB EMS f/ home. 93-94% on RA en route improved to 96% on 2 LPM NC. Pt c/o chest pressure x 1 week as well, associated to the SOB. Pt VS and presentation reassuring.

## 2024-01-04 ENCOUNTER — APPOINTMENT (OUTPATIENT)
Dept: GASTROENTEROLOGY | Facility: EXTERNAL LOCATION | Age: 69
End: 2024-01-04
Payer: MEDICARE

## 2024-01-04 ENCOUNTER — APPOINTMENT (OUTPATIENT)
Dept: PHYSICAL THERAPY | Facility: CLINIC | Age: 69
End: 2024-01-04
Payer: MEDICARE

## 2024-01-04 LAB
ATRIAL RATE: 67 BPM
P AXIS: 0 DEGREES
P OFFSET: 191 MS
P ONSET: 145 MS
PR INTERVAL: 160 MS
Q ONSET: 225 MS
QRS COUNT: 11 BEATS
QRS DURATION: 80 MS
QT INTERVAL: 414 MS
QTC CALCULATION(BAZETT): 437 MS
QTC FREDERICIA: 429 MS
R AXIS: 11 DEGREES
T AXIS: -17 DEGREES
T OFFSET: 432 MS
VENTRICULAR RATE: 67 BPM

## 2024-01-09 ENCOUNTER — APPOINTMENT (OUTPATIENT)
Dept: PHYSICAL THERAPY | Facility: CLINIC | Age: 69
End: 2024-01-09
Payer: MEDICARE

## 2024-01-09 ENCOUNTER — DOCUMENTATION (OUTPATIENT)
Dept: PHYSICAL THERAPY | Facility: CLINIC | Age: 69
End: 2024-01-09
Payer: MEDICARE

## 2024-01-09 NOTE — PROGRESS NOTES
Physical Therapy                 Therapy Communication Note    Patient Name: Dyana Irene  MRN: [id number]  Today's Date: 1/9/2024     Discipline: Physical Therapy    Missed Visit Reason:      Missed Time: Cancel

## 2024-01-09 NOTE — PROGRESS NOTES
"  Physical Therapy Treatment    Patient Name: Dyana Irene  MRN: [id number]  Today's Date: 1/9/2024     Current Problem  No diagnosis found.    Insurance:             Subjective   General           Performing HEP?: {Yes/No:96377}    Precautions     Pain       Objective           Treatments:  Therapeutic Exercise  Therapeutic Exercise Activity 1: SciFit [place] L2 x 5 min  Therapeutic Exercise Activity 2: incline g/s stretch x 1 min  Therapeutic Exercise Activity 3: HT raises x 1 min  Therapeutic Exercise Activity 4: R/L HS stretch x 30\" ea  Therapeutic Exercise Activity 5: seated w/ UE on SB: FF x 1 min and R/L SB x 1 min  Therapeutic Exercise Activity 6: YTB loop R/L hip ABD 2 x 10 ea  Therapeutic Exercise Activity 7: YTB loop R/L hip EXT 2 x 10 ea  Therapeutic Exercise Activity 8: sit to stand with 4# ball 2 x 5  Therapeutic Exercise Activity 9: Airex DLB x 1' w/o UE; march and tap stool x 10 ea R/L   Therapeutic Exercise Activity 10: HL march w/ TA x 2'  Therapeutic Exercise Activity 11: mini bridge over bolster 5 sec x 2'        Manual Therapy  Manual Therapy Activity 1: PROM B hips and knees- hold OP R knee   Manual Therapy Activity 2: stretch B HS, gluts, ITB, R psoas  Manual Therapy Activity 3: STM B LS para, SI, gluts, ITB                                    OP EDUCATION:       Assessment:       Plan:       Goals:  Active       PT Problem       PT Goal 1 (Progressing)       Start:  11/06/23    Expected End:  02/04/24       STG(4 wks):  - Pt. will have at least 10% increase in flexibility of affected joints/structures being treated- back/LE- met  - Pt will have improved postural awareness for daily activities and exercises as evidenced by self corrections for improper positions/movements- progressing  - Pt. will be able to perform safe and independent transfers/bed mobility with less effort- met/progressing  - Pt will be independent and compliant with HEP- met  - Pt will have return of at least 50% AROM of " lumbar spine- progressing         PT Goal 2 (Progressing)       Start:  11/06/23    Expected End:  02/04/24         LTG( 10 wks)  - Progress HEP and RONNA within pt tolerance to goal attainment, including return to ADL/IADL and functional activities w/ less pain  - Pt. will have improved balance as evidenced by increased SLS time of at least 10 sec for improved everyday balance and less fall potential  - Pt. will have safe, independent ambulation without assistive devices for community distances and stairs/curbs with normalized gait pattern and erect posture  - Pt. will have reduction of pain to no greater than 3 of 10 with ex. and activities  - Pt. will have increased strength of at least 1/3 muscle grade in affected muscle groups- core/LB support mm  - Pt. will have at least 10% improvement in functional test score- ELSIE-as evidenced by self report of improved ability to perform every day activities w/ greater ease and less pain  - Pt will be supervised and a gait belt will be used if needed to assure safety in the clinic during treatment                  Maritza Damico, PT

## 2024-01-10 ENCOUNTER — APPOINTMENT (OUTPATIENT)
Dept: RADIOLOGY | Facility: CLINIC | Age: 69
End: 2024-01-10
Payer: MEDICARE

## 2024-01-11 ENCOUNTER — APPOINTMENT (OUTPATIENT)
Dept: PHYSICAL THERAPY | Facility: CLINIC | Age: 69
End: 2024-01-11
Payer: MEDICARE

## 2024-01-16 ENCOUNTER — APPOINTMENT (OUTPATIENT)
Dept: CARDIOLOGY | Facility: CLINIC | Age: 69
End: 2024-01-16
Payer: MEDICARE

## 2024-01-17 ENCOUNTER — TREATMENT (OUTPATIENT)
Dept: PHYSICAL THERAPY | Facility: CLINIC | Age: 69
End: 2024-01-17
Payer: MEDICARE

## 2024-01-17 DIAGNOSIS — G89.29 CHRONIC BILATERAL LOW BACK PAIN WITH BILATERAL SCIATICA: ICD-10-CM

## 2024-01-17 DIAGNOSIS — M54.41 CHRONIC BILATERAL LOW BACK PAIN WITH BILATERAL SCIATICA: ICD-10-CM

## 2024-01-17 DIAGNOSIS — M54.42 CHRONIC BILATERAL LOW BACK PAIN WITH BILATERAL SCIATICA: ICD-10-CM

## 2024-01-17 PROCEDURE — 97140 MANUAL THERAPY 1/> REGIONS: CPT | Mod: GP

## 2024-01-17 PROCEDURE — 97110 THERAPEUTIC EXERCISES: CPT | Mod: GP

## 2024-01-17 ASSESSMENT — PAIN SCALES - GENERAL: PAINLEVEL_OUTOF10: 6

## 2024-01-17 ASSESSMENT — PAIN - FUNCTIONAL ASSESSMENT: PAIN_FUNCTIONAL_ASSESSMENT: 0-10

## 2024-01-17 ASSESSMENT — PAIN DESCRIPTION - DESCRIPTORS: DESCRIPTORS: ACHING

## 2024-01-17 NOTE — PROGRESS NOTES
"  Physical Therapy Treatment    Patient Name: Dyana Irene  MRN: [id number]  Today's Date: 1/17/2024  Time Calculation  Start Time: 0131  Stop Time: 0214  Time Calculation (min): 43 min  Current Problem  1. Chronic bilateral low back pain with bilateral sciatica  Follow Up In Physical Therapy          Insurance:  Number of Treatments Authorized: V#1/mn; (seen 9 x in 2023 this episode)  Certification Period Start Date: 11/06/23  Certification Period End Date: 02/04/24    Subjective   General  General Comment: Pt has been ill for several weeks, was very SOB- was taken to ER, nothing found, had viral sx and starting to feel better; very weak when ill- not up to doing HEP, trying to start again        Performing HEP?: No  - was very ill    Precautions  Precautions  Precautions Comment: mod fall risk  Pain  Pain Assessment: 0-10  Pain Score: 6  Pain Location: Back  Pain Orientation: Lower  Pain Radiating Towards: BLE  Pain Descriptors: Aching    Objective   Pt w/ FWD trunk lean w/ standing and gait, RLE toes out; able to stand fully erect w/ light support on // bar  R knee pain w/ manual RLE, very tender R>L piri/glut and ITB   Treatments:  Therapeutic Exercise  Therapeutic Exercise Activity 1: SciFit [place] L1 x 5 min  Therapeutic Exercise Activity 2: incline g/s stretch x 1 min  Therapeutic Exercise Activity 3: HT raises x 1 min  Therapeutic Exercise Activity 4: R/L HS stretch x 30\" ea  Therapeutic Exercise Activity 5: seated w/ UE on SB: FF x 1 min and R/L SB x 1 min  Therapeutic Exercise Activity 6: side step along // bar x 1'  Therapeutic Exercise Activity 7: R/L hip ext taps x 1' ALT   Therapeutic Exercise Activity 8: sit to stand w/o  UE assist x 10  Therapeutic Exercise Activity 9: standing trunk ext to neutral 5 sec x 1'   Therapeutic Exercise Activity 10: SB DKTC x 2'  Therapeutic Exercise Activity 11: SB TR x 2'  - R/L clam shells in sidelying x 10 ea        Manual Therapy  MFR R/L leg pull, gentle and w/ opp " knee flexed   Manual Therapy Activity 1: PROM B hips and knees- hold OP R knee   Manual Therapy Activity 2: stretch B HS, gluts, ITB, R psoas  Manual Therapy Activity 3: STM B LS para, SI, gluts, ITB                                 OP EDUCATION:     Resume daily HEP   Assessment:  PT Assessment  Assessment Comment: Pt missed sev weeks of Rx due to respiratory illness, recovering though low energy today and TE altered, pain LB escalated since last seen- RLE tight in hip and knee w/ pain during manual stretch and soft tissue work; will cont Rx- encouraged pt to return to HEP compliance and will progress program as angelica    Plan:  OP PT Plan  Treatment/Interventions: Cryotherapy, Education/ Instruction, Electrical stimulation, Gait training, Hot pack, Manual therapy, Neuromuscular re-education, Therapeutic exercises, Ultrasound (modalities prn)  PT Plan: Skilled PT  Onset Date: 07/01/23  Certification Period Start Date: 11/06/23  Certification Period End Date: 02/04/24  Number of Treatments Authorized: V#1/mn; (seen 9 x in 2023 this episode)    Goals:  Active       PT Problem       PT Goal 1 (Progressing)       Start:  11/06/23    Expected End:  02/04/24       STG(4 wks):  - Pt. will have at least 10% increase in flexibility of affected joints/structures being treated- back/LE- met  - Pt will have improved postural awareness for daily activities and exercises as evidenced by self corrections for improper positions/movements- progressing  - Pt. will be able to perform safe and independent transfers/bed mobility with less effort- met/progressing  - Pt will be independent and compliant with HEP- met  - Pt will have return of at least 50% AROM of lumbar spine- progressing         PT Goal 2 (Progressing)       Start:  11/06/23    Expected End:  02/04/24         LTG( 10 wks)  - Progress HEP and RONNA within pt tolerance to goal attainment, including return to ADL/IADL and functional activities w/ less pain  - Pt. will have  improved balance as evidenced by increased SLS time of at least 10 sec for improved everyday balance and less fall potential  - Pt. will have safe, independent ambulation without assistive devices for community distances and stairs/curbs with normalized gait pattern and erect posture  - Pt. will have reduction of pain to no greater than 3 of 10 with ex. and activities  - Pt. will have increased strength of at least 1/3 muscle grade in affected muscle groups- core/LB support mm  - Pt. will have at least 10% improvement in functional test score- ELSIE-as evidenced by self report of improved ability to perform every day activities w/ greater ease and less pain  - Pt will be supervised and a gait belt will be used if needed to assure safety in the clinic during treatment                  Maritza Damico, PT

## 2024-01-23 ENCOUNTER — APPOINTMENT (OUTPATIENT)
Dept: PHYSICAL THERAPY | Facility: CLINIC | Age: 69
End: 2024-01-23
Payer: MEDICARE

## 2024-01-23 ENCOUNTER — DOCUMENTATION (OUTPATIENT)
Dept: PHYSICAL THERAPY | Facility: CLINIC | Age: 69
End: 2024-01-23
Payer: MEDICARE

## 2024-01-23 NOTE — PROGRESS NOTES
"  Physical Therapy Treatment    Patient Name: Dyana Irene  MRN: [id number]  Today's Date: 1/23/2024     Current Problem  No diagnosis found.    Insurance:             Subjective   General           Performing HEP?: {Yes/No:94971}    Precautions     Pain       Objective           Treatments:  Therapeutic Exercise  Therapeutic Exercise Activity 1: SciFit [place] L1 x 5 min  Therapeutic Exercise Activity 2: incline g/s stretch x 1 min  Therapeutic Exercise Activity 3: HT raises x 1 min  Therapeutic Exercise Activity 4: R/L HS stretch x 30\" ea  Therapeutic Exercise Activity 5: seated w/ UE on SB: FF x 1 min and R/L SB x 1 min  Therapeutic Exercise Activity 6: side step along // bar x 1'  Therapeutic Exercise Activity 7: R/L hip ext taps x 1' ALT   Therapeutic Exercise Activity 8: sit to stand w/o  UE assist x 10  Therapeutic Exercise Activity 9: standing trunk ext to neutral 5 sec x 1'   Therapeutic Exercise Activity 10: SB DKTC x 2'  Therapeutic Exercise Activity 11: SB TR x 2'  - R/L clam shells in sidelying x 10 ea        Manual Therapy  MFR R/L leg pull, gentle and w/ opp knee flexed   Manual Therapy Activity 1: PROM B hips and knees- hold OP R knee   Manual Therapy Activity 2: stretch B HS, gluts, ITB, R psoas  Manual Therapy Activity 3: STM B LS para, SI, gluts, ITB                                    OP EDUCATION:       Assessment:       Plan:       Goals:  Active       PT Problem       PT Goal 1 (Progressing)       Start:  11/06/23    Expected End:  02/04/24       STG(4 wks):  - Pt. will have at least 10% increase in flexibility of affected joints/structures being treated- back/LE- met  - Pt will have improved postural awareness for daily activities and exercises as evidenced by self corrections for improper positions/movements- progressing  - Pt. will be able to perform safe and independent transfers/bed mobility with less effort- met/progressing  - Pt will be independent and compliant with HEP- met  - Pt will " have return of at least 50% AROM of lumbar spine- progressing         PT Goal 2 (Progressing)       Start:  11/06/23    Expected End:  02/04/24         LTG( 10 wks)  - Progress HEP and RONNA within pt tolerance to goal attainment, including return to ADL/IADL and functional activities w/ less pain  - Pt. will have improved balance as evidenced by increased SLS time of at least 10 sec for improved everyday balance and less fall potential  - Pt. will have safe, independent ambulation without assistive devices for community distances and stairs/curbs with normalized gait pattern and erect posture  - Pt. will have reduction of pain to no greater than 3 of 10 with ex. and activities  - Pt. will have increased strength of at least 1/3 muscle grade in affected muscle groups- core/LB support mm  - Pt. will have at least 10% improvement in functional test score- ELSIE-as evidenced by self report of improved ability to perform every day activities w/ greater ease and less pain  - Pt will be supervised and a gait belt will be used if needed to assure safety in the clinic during treatment                  Maritza Damico, PT

## 2024-01-24 ENCOUNTER — HOSPITAL ENCOUNTER (OUTPATIENT)
Dept: RADIOLOGY | Facility: CLINIC | Age: 69
Discharge: HOME | End: 2024-01-24
Payer: MEDICARE

## 2024-01-24 VITALS — BODY MASS INDEX: 37.93 KG/M2 | HEIGHT: 66 IN | WEIGHT: 236 LBS

## 2024-01-24 DIAGNOSIS — Z12.31 ENCOUNTER FOR SCREENING MAMMOGRAM FOR MALIGNANT NEOPLASM OF BREAST: ICD-10-CM

## 2024-01-24 PROCEDURE — 77067 SCR MAMMO BI INCL CAD: CPT

## 2024-01-30 ENCOUNTER — TREATMENT (OUTPATIENT)
Dept: PHYSICAL THERAPY | Facility: CLINIC | Age: 69
End: 2024-01-30
Payer: MEDICARE

## 2024-01-30 DIAGNOSIS — G89.29 CHRONIC BILATERAL LOW BACK PAIN WITH BILATERAL SCIATICA: Primary | ICD-10-CM

## 2024-01-30 DIAGNOSIS — M54.41 CHRONIC BILATERAL LOW BACK PAIN WITH BILATERAL SCIATICA: Primary | ICD-10-CM

## 2024-01-30 DIAGNOSIS — M54.42 CHRONIC BILATERAL LOW BACK PAIN WITH BILATERAL SCIATICA: Primary | ICD-10-CM

## 2024-01-30 PROCEDURE — 97140 MANUAL THERAPY 1/> REGIONS: CPT | Mod: GP

## 2024-01-30 PROCEDURE — 97110 THERAPEUTIC EXERCISES: CPT | Mod: GP

## 2024-01-30 ASSESSMENT — PAIN - FUNCTIONAL ASSESSMENT: PAIN_FUNCTIONAL_ASSESSMENT: 0-10

## 2024-01-30 ASSESSMENT — PAIN SCALES - GENERAL: PAINLEVEL_OUTOF10: 6

## 2024-01-30 NOTE — PROGRESS NOTES
"  Physical Therapy Treatment/Re Cert     Patient Name: Dyana Irene  MRN: [id number]  Today's Date: 1/30/2024  Time Calculation  Start Time: 1100  Stop Time: 1144  Time Calculation (min): 44 min  Current Problem  1. Chronic bilateral low back pain with bilateral sciatica  Follow Up In Physical Therapy          Insurance:  Number of Treatments Authorized: V#2/mn; (seen 9 x in 2023 this episode)  Certification Period Start Date: 01/30/24  Certification Period End Date: 03/30/24 (RE CERT)    Subjective   General  General Comment: Pt was ill again w/ \"a stomach thing\"- better but back pain cont to persist; wakes up w/ nearly no pain, then increases as on feet/walking; planning to see a back specialist re: other Rx options for the back pain sx; ordered a set of trekking poles      Performing HEP?: Yes    Precautions  Precautions  Precautions Comment: mod fall risk  Pain  Pain Assessment: 0-10  Pain Score: 6  Pain Location: Back  Pain Orientation: Lower  Pain Radiating Towards: BLE    Objective   Gait w/ flexed trunk- pt can erect w/ cue for short distances- LBP increases    Treatments:  Therapeutic Exercise  Therapeutic Exercise Activity 1: SciFit [place] LE L1 x 6 min  Therapeutic Exercise Activity 2: incline g/s stretch x 1 min  Therapeutic Exercise Activity 3: HT raises x 1 min  Therapeutic Exercise Activity 4: R/L HS stretch x 30\" ea  Therapeutic Exercise Activity 5: seated w/ UE on SB: FF x 1 min and R/L SB x 1 min  Therapeutic Exercise Activity 6: R/L hip abd x 10 ea  Therapeutic Exercise Activity 7: R/L hip ext taps x 10 ea  Therapeutic Exercise Activity 8: sit to stand w/o  UE assist x 10  Therapeutic Exercise Activity 9: standing trunk ext to neutral 5 sec x 1'   Therapeutic Exercise Activity 10: SB DKTC x 2'  Therapeutic Exercise Activity 11: SB TR x 2'  R/L clam shells in sidelying x 10 ea        Manual Therapy  MFR R/L leg pull, gentle and w/ opp knee flexed   Manual Therapy Activity 1: PROM B hips and knees- " hold OP R knee   Manual Therapy Activity 2: stretch B HS, gluts, ITB, R psoas  Manual Therapy Activity 3: STM B LS para, SI, gluts, ITB                                              Assessment:  PT Assessment  Assessment Comment: Pt cont to struggle w/ attending Rx consistently due to illnesses; has worked on HEP daily as able, note pt is moving w/ much greater ease on table- rolling onto sides w/o pain; sit to stand w/o UE assist also w/ ease repetitively; remains w/ flexed trunk w/ gait- can correct in standing and for short distance walking but is painful; R knee also w/ some pain during LE manual work; pt to bring in [employer] poles for instruction in use    Plan:  OP PT Plan  PT Plan: Skilled PT  Onset Date: 07/01/23  Certification Period Start Date: 01/30/24  Certification Period End Date: 03/30/24 (RE CERT)  Number of Treatments Authorized: V#2/mn; (seen 9 x in 2023 this episode)  Cont Rx for ROM/flexibility, sx mgt, soft tissue mgt, gait/balance and transfer training, mm re-ed, core stabilization, HEP w/ progression, posture, body mechanics and safety training so that pt can perform daily functional activities w/ less pain, more safely and w/ greater ease   Goals:  Active       PT Problem       PT Goal 1 (Progressing)       Start:  11/06/23    Expected End:  02/29/24       STG(4 wks):  - Pt. will have at least 10% increase in flexibility of affected joints/structures being treated- back/LE- met  - Pt will have improved postural awareness for daily activities and exercises as evidenced by self corrections for improper positions/movements- progressing  - Pt. will be able to perform safe and independent transfers/bed mobility with less effort- met/progressing  - Pt will be independent and compliant with HEP- met  - Pt will have return of at least 50% AROM of lumbar spine- progressing         PT Goal 2 (Progressing)       Start:  11/06/23    Expected End:  03/30/24         LTG( 10 wks)  - Progress HEP and RONNA  within pt tolerance to goal attainment, including return to ADL/IADL and functional activities w/ less pain  - Pt. will have improved balance as evidenced by increased SLS time of at least 10 sec for improved everyday balance and less fall potential  - Pt. will have safe, independent ambulation without assistive devices for community distances and stairs/curbs with normalized gait pattern and erect posture  - Pt. will have reduction of pain to no greater than 3 of 10 with ex. and activities  - Pt. will have increased strength of at least 1/3 muscle grade in affected muscle groups- core/LB support mm  - Pt. will have at least 10% improvement in functional test score- ELSIE-as evidenced by self report of improved ability to perform every day activities w/ greater ease and less pain  - Pt will be supervised and a gait belt will be used if needed to assure safety in the clinic during treatment              - goal time frames extended due to pt illness  Maritza Damico, PT

## 2024-02-01 ENCOUNTER — APPOINTMENT (OUTPATIENT)
Dept: CARDIOLOGY | Facility: CLINIC | Age: 69
End: 2024-02-01
Payer: MEDICARE

## 2024-02-06 ENCOUNTER — APPOINTMENT (OUTPATIENT)
Dept: PHYSICAL THERAPY | Facility: CLINIC | Age: 69
End: 2024-02-06
Payer: MEDICARE

## 2024-02-06 ENCOUNTER — DOCUMENTATION (OUTPATIENT)
Dept: PHYSICAL THERAPY | Facility: CLINIC | Age: 69
End: 2024-02-06
Payer: MEDICARE

## 2024-02-06 DIAGNOSIS — I10 ESSENTIAL (PRIMARY) HYPERTENSION: ICD-10-CM

## 2024-02-06 DIAGNOSIS — E87.6 HYPOKALEMIA: ICD-10-CM

## 2024-02-06 RX ORDER — CHLORTHALIDONE 25 MG/1
25 TABLET ORAL DAILY
Qty: 90 TABLET | Refills: 1 | Status: SHIPPED | OUTPATIENT
Start: 2024-02-06

## 2024-02-06 RX ORDER — LOSARTAN POTASSIUM 100 MG/1
100 TABLET ORAL DAILY
Qty: 90 TABLET | Refills: 1 | Status: SHIPPED | OUTPATIENT
Start: 2024-02-06

## 2024-02-06 RX ORDER — POTASSIUM CHLORIDE 1500 MG/1
20 TABLET, EXTENDED RELEASE ORAL DAILY
Qty: 90 TABLET | Refills: 1 | Status: SHIPPED | OUTPATIENT
Start: 2024-02-06

## 2024-02-06 NOTE — PROGRESS NOTES
"  Physical Therapy Treatment    Patient Name: Dyana Irene  MRN: [id number]  Today's Date: 2/6/2024     Current Problem  No diagnosis found.    Insurance:             Subjective   General           Performing HEP?: {Yes/No:84885}    Precautions     Pain       Objective         Treatments:  Therapeutic Exercise  Therapeutic Exercise Activity 1: SciFit [place] LE L1 x 6 min  Therapeutic Exercise Activity 2: incline g/s stretch x 1 min  Therapeutic Exercise Activity 3: HT raises x 1 min  Therapeutic Exercise Activity 4: R/L HS stretch x 30\" ea  Therapeutic Exercise Activity 5: seated w/ UE on SB: FF x 1 min and R/L SB x 1 min  Therapeutic Exercise Activity 6: R/L hip abd x 10 ea  Therapeutic Exercise Activity 7: R/L hip ext taps x 10 ea  Therapeutic Exercise Activity 8: sit to stand w/o  UE assist x 10  Therapeutic Exercise Activity 9: standing trunk ext to neutral 5 sec x 1'   Therapeutic Exercise Activity 10: SB DKTC x 2'  Therapeutic Exercise Activity 11: SB TR x 2'  R/L clam shells in sidelying x 10 ea        Manual Therapy  MFR R/L leg pull, gentle and w/ opp knee flexed   Manual Therapy Activity 1: PROM B hips and knees- hold OP R knee   Manual Therapy Activity 2: stretch B HS, gluts, ITB, R psoas  Manual Therapy Activity 3: STM B LS para, SI, gluts, ITB                                 OP EDUCATION:       Assessment:       Plan:       Goals:  Active       PT Problem       PT Goal 1 (Progressing)       Start:  11/06/23    Expected End:  02/29/24       STG(4 wks):  - Pt. will have at least 10% increase in flexibility of affected joints/structures being treated- back/LE- met  - Pt will have improved postural awareness for daily activities and exercises as evidenced by self corrections for improper positions/movements- progressing  - Pt. will be able to perform safe and independent transfers/bed mobility with less effort- met/progressing  - Pt will be independent and compliant with HEP- met  - Pt will have return of " at least 50% AROM of lumbar spine- progressing         PT Goal 2 (Progressing)       Start:  11/06/23    Expected End:  03/30/24         LTG( 10 wks)  - Progress HEP and RONNA within pt tolerance to goal attainment, including return to ADL/IADL and functional activities w/ less pain  - Pt. will have improved balance as evidenced by increased SLS time of at least 10 sec for improved everyday balance and less fall potential  - Pt. will have safe, independent ambulation without assistive devices for community distances and stairs/curbs with normalized gait pattern and erect posture  - Pt. will have reduction of pain to no greater than 3 of 10 with ex. and activities  - Pt. will have increased strength of at least 1/3 muscle grade in affected muscle groups- core/LB support mm  - Pt. will have at least 10% improvement in functional test score- ELSIE-as evidenced by self report of improved ability to perform every day activities w/ greater ease and less pain  - Pt will be supervised and a gait belt will be used if needed to assure safety in the clinic during treatment                  Maritza Damico, PT

## 2024-02-06 NOTE — PROGRESS NOTES
Cancel Physical Therapy                 Therapy Communication Note    Patient Name: Dyana Irene  MRN: [id number]  Today's Date: 2/6/2024     Discipline: Physical Therapy    Missed Visit Reason:      Missed Time: Cancel    Comment:  - per message, pt ill

## 2024-02-13 ENCOUNTER — HOSPITAL ENCOUNTER (OUTPATIENT)
Dept: RADIOLOGY | Facility: CLINIC | Age: 69
Discharge: HOME | End: 2024-02-13
Payer: MEDICARE

## 2024-02-13 ENCOUNTER — OFFICE VISIT (OUTPATIENT)
Dept: PRIMARY CARE | Facility: CLINIC | Age: 69
End: 2024-02-13
Payer: MEDICARE

## 2024-02-13 ENCOUNTER — OFFICE VISIT (OUTPATIENT)
Dept: OTOLARYNGOLOGY | Facility: CLINIC | Age: 69
End: 2024-02-13
Payer: MEDICARE

## 2024-02-13 VITALS
TEMPERATURE: 96.9 F | BODY MASS INDEX: 39.53 KG/M2 | DIASTOLIC BLOOD PRESSURE: 80 MMHG | HEART RATE: 89 BPM | WEIGHT: 246 LBS | HEIGHT: 66 IN | OXYGEN SATURATION: 98 % | SYSTOLIC BLOOD PRESSURE: 140 MMHG

## 2024-02-13 VITALS — WEIGHT: 245 LBS | HEIGHT: 66 IN | BODY MASS INDEX: 39.37 KG/M2

## 2024-02-13 DIAGNOSIS — E78.2 MIXED HYPERLIPIDEMIA: ICD-10-CM

## 2024-02-13 DIAGNOSIS — R05.3 PERSISTENT COUGH: ICD-10-CM

## 2024-02-13 DIAGNOSIS — H69.93 DYSFUNCTION OF BOTH EUSTACHIAN TUBES: ICD-10-CM

## 2024-02-13 DIAGNOSIS — J30.89 NON-SEASONAL ALLERGIC RHINITIS DUE TO OTHER ALLERGIC TRIGGER: Primary | ICD-10-CM

## 2024-02-13 DIAGNOSIS — R09.82 POST-NASAL DRIP: ICD-10-CM

## 2024-02-13 DIAGNOSIS — D64.9 ANEMIA, UNSPECIFIED TYPE: ICD-10-CM

## 2024-02-13 DIAGNOSIS — H61.23 BILATERAL IMPACTED CERUMEN: ICD-10-CM

## 2024-02-13 DIAGNOSIS — I10 BENIGN ESSENTIAL HYPERTENSION: ICD-10-CM

## 2024-02-13 DIAGNOSIS — R05.3 PERSISTENT COUGH: Primary | ICD-10-CM

## 2024-02-13 PROBLEM — H91.90 HEARING LOSS: Status: ACTIVE | Noted: 2024-02-13

## 2024-02-13 PROBLEM — H66.90 OTITIS MEDIA: Status: RESOLVED | Noted: 2024-02-13 | Resolved: 2024-02-13

## 2024-02-13 PROBLEM — M48.061 SPINAL STENOSIS OF LUMBAR REGION: Status: ACTIVE | Noted: 2021-06-25

## 2024-02-13 PROBLEM — R11.0 NAUSEA: Status: RESOLVED | Noted: 2024-02-13 | Resolved: 2024-02-13

## 2024-02-13 PROBLEM — R91.8 LUNG MASS: Status: RESOLVED | Noted: 2024-02-13 | Resolved: 2024-02-13

## 2024-02-13 PROBLEM — M19.049 ARTHROPATHY OF HAND: Status: ACTIVE | Noted: 2024-02-13

## 2024-02-13 PROBLEM — H69.92 DYSFUNCTION OF LEFT EUSTACHIAN TUBE: Status: RESOLVED | Noted: 2023-01-19 | Resolved: 2024-02-13

## 2024-02-13 PROBLEM — E66.01 SEVERE OBESITY (MULTI): Status: RESOLVED | Noted: 2024-02-13 | Resolved: 2024-02-13

## 2024-02-13 PROBLEM — M25.569 KNEE PAIN: Status: RESOLVED | Noted: 2023-02-21 | Resolved: 2024-02-13

## 2024-02-13 PROBLEM — F17.210 CIGARETTE SMOKER: Status: RESOLVED | Noted: 2024-02-13 | Resolved: 2024-02-13

## 2024-02-13 PROBLEM — E87.6 HYPOKALEMIA: Status: RESOLVED | Noted: 2024-02-13 | Resolved: 2024-02-13

## 2024-02-13 PROBLEM — M70.51 PES ANSERINUS BURSITIS OF RIGHT KNEE: Status: RESOLVED | Noted: 2024-02-13 | Resolved: 2024-02-13

## 2024-02-13 PROBLEM — J30.9 ALLERGIC RHINITIS: Status: ACTIVE | Noted: 2024-02-13

## 2024-02-13 PROBLEM — F32.A DEPRESSIVE DISORDER: Status: ACTIVE | Noted: 2024-02-13

## 2024-02-13 PROBLEM — R94.31 ABNORMAL ELECTROCARDIOGRAPHY: Status: RESOLVED | Noted: 2022-12-30 | Resolved: 2024-02-13

## 2024-02-13 PROBLEM — H61.20 IMPACTED CERUMEN: Status: RESOLVED | Noted: 2024-02-13 | Resolved: 2024-02-13

## 2024-02-13 PROBLEM — Z85.828 HISTORY OF MALIGNANT NEOPLASM OF SKIN: Status: RESOLVED | Noted: 2022-10-04 | Resolved: 2024-02-13

## 2024-02-13 PROBLEM — B37.2 CANDIDIASIS OF SKIN: Status: RESOLVED | Noted: 2024-02-13 | Resolved: 2024-02-13

## 2024-02-13 PROBLEM — M17.11 OSTEOARTHRITIS OF RIGHT KNEE: Status: ACTIVE | Noted: 2024-02-13

## 2024-02-13 PROBLEM — R06.02 SHORTNESS OF BREATH: Status: RESOLVED | Noted: 2024-02-13 | Resolved: 2024-02-13

## 2024-02-13 PROBLEM — M17.9 OSTEOARTHRITIS OF KNEE: Status: ACTIVE | Noted: 2021-12-17

## 2024-02-13 PROBLEM — F17.210 TOBACCO DEPENDENCE DUE TO CIGARETTES: Status: ACTIVE | Noted: 2024-02-13

## 2024-02-13 PROBLEM — Z86.59 HISTORY OF DEPRESSION: Status: RESOLVED | Noted: 2024-02-13 | Resolved: 2024-02-13

## 2024-02-13 PROBLEM — E66.01 MORBID OBESITY (MULTI): Status: RESOLVED | Noted: 2024-02-13 | Resolved: 2024-02-13

## 2024-02-13 PROBLEM — R10.9 ABDOMINAL PAIN: Status: RESOLVED | Noted: 2023-09-06 | Resolved: 2024-02-13

## 2024-02-13 PROBLEM — R22.9 MASS OF SKIN: Status: RESOLVED | Noted: 2024-02-13 | Resolved: 2024-02-13

## 2024-02-13 PROBLEM — R31.9 HEMATURIA: Status: RESOLVED | Noted: 2023-09-06 | Resolved: 2024-02-13

## 2024-02-13 PROBLEM — H90.3 SENSORINEURAL HEARING LOSS, BILATERAL: Status: RESOLVED | Noted: 2023-01-19 | Resolved: 2024-02-13

## 2024-02-13 PROBLEM — I65.23 BILATERAL CAROTID ARTERY STENOSIS: Status: ACTIVE | Noted: 2022-10-24

## 2024-02-13 PROCEDURE — 1159F MED LIST DOCD IN RCRD: CPT | Performed by: OTOLARYNGOLOGY

## 2024-02-13 PROCEDURE — 3077F SYST BP >= 140 MM HG: CPT | Performed by: INTERNAL MEDICINE

## 2024-02-13 PROCEDURE — 1125F AMNT PAIN NOTED PAIN PRSNT: CPT | Performed by: INTERNAL MEDICINE

## 2024-02-13 PROCEDURE — 1159F MED LIST DOCD IN RCRD: CPT | Performed by: INTERNAL MEDICINE

## 2024-02-13 PROCEDURE — 71046 X-RAY EXAM CHEST 2 VIEWS: CPT | Performed by: RADIOLOGY

## 2024-02-13 PROCEDURE — 1036F TOBACCO NON-USER: CPT | Performed by: OTOLARYNGOLOGY

## 2024-02-13 PROCEDURE — 1125F AMNT PAIN NOTED PAIN PRSNT: CPT | Performed by: OTOLARYNGOLOGY

## 2024-02-13 PROCEDURE — 99214 OFFICE O/P EST MOD 30 MIN: CPT | Performed by: INTERNAL MEDICINE

## 2024-02-13 PROCEDURE — 1157F ADVNC CARE PLAN IN RCRD: CPT | Performed by: OTOLARYNGOLOGY

## 2024-02-13 PROCEDURE — 71046 X-RAY EXAM CHEST 2 VIEWS: CPT

## 2024-02-13 PROCEDURE — 1157F ADVNC CARE PLAN IN RCRD: CPT | Performed by: INTERNAL MEDICINE

## 2024-02-13 PROCEDURE — 92511 NASOPHARYNGOSCOPY: CPT | Performed by: OTOLARYNGOLOGY

## 2024-02-13 PROCEDURE — 3079F DIAST BP 80-89 MM HG: CPT | Performed by: INTERNAL MEDICINE

## 2024-02-13 PROCEDURE — 99213 OFFICE O/P EST LOW 20 MIN: CPT | Performed by: OTOLARYNGOLOGY

## 2024-02-13 PROCEDURE — 1036F TOBACCO NON-USER: CPT | Performed by: INTERNAL MEDICINE

## 2024-02-13 RX ORDER — DOXYCYCLINE 100 MG/1
100 CAPSULE ORAL 2 TIMES DAILY
Qty: 14 CAPSULE | Refills: 0 | Status: SHIPPED | OUTPATIENT
Start: 2024-02-13 | End: 2024-02-20

## 2024-02-13 RX ORDER — FLUTICASONE PROPIONATE 50 MCG
2 SPRAY, SUSPENSION (ML) NASAL DAILY
Qty: 48 ML | Refills: 1 | Status: SHIPPED | OUTPATIENT
Start: 2024-02-13

## 2024-02-13 RX ORDER — FLUTICASONE PROPIONATE 50 MCG
1 SPRAY, SUSPENSION (ML) NASAL DAILY
Qty: 16 G | Refills: 11 | Status: SHIPPED | OUTPATIENT
Start: 2024-02-13 | End: 2025-02-12

## 2024-02-13 RX ORDER — AZELASTINE 1 MG/ML
2 SPRAY, METERED NASAL 2 TIMES DAILY
Qty: 90 ML | Refills: 1 | Status: SHIPPED | OUTPATIENT
Start: 2024-02-13

## 2024-02-13 ASSESSMENT — PAIN SCALES - GENERAL: PAINLEVEL: 7

## 2024-02-13 ASSESSMENT — ENCOUNTER SYMPTOMS: COUGH: 1

## 2024-02-13 NOTE — ASSESSMENT & PLAN NOTE
Hypertension : controlled blood pressure and continues same medications and educate a low salt diet do not exceed 200 mg per serving. Keep monitor the blood pressure at home.

## 2024-02-13 NOTE — PROGRESS NOTES
Subjective   Patient ID: Dyana Irene is a 68 y.o. female  HPI  Patient presents for follow-up for chronic history of left eustachian tube dysfunction and chronic serous otitis media.  She is complaining of congestion in her ears bilaterally.  She has no otalgia and no otorrhea.  She is complaining of increase of the rhinorrhea and postnasal drainage despite using Flonase as needed  Review of Systems    Objective   Physical Exam  There is cerumen impaction bilaterally and this was cleared using speculum and curettes.  The tympanic membranes are retracted but they are moving with a Valsalva maneuver and insufflation.  There is nasal edema and the turbinates are pale.  Fiberoptic nasopharyngoscopy was offered in light of the eustachian tube dysfunction.  The nasal cavity and nasopharynx and hypopharynx were noted to be clear.  The eustachian tube folds are noted to be normal.    Nasopharyngoscopy    Date/Time: 2/13/2024 2:26 PM    Performed by: Poli Gutierrez MD  Authorized by: Poli Gutierrez MD    Consent:     Consent obtained:  Verbal    Risks discussed:  Pain and infection  Procedure details:     Indications comment:  Bilateral eustachian tube dysfunction    Meds administered: Lidocaine and Afrin nasal sprays.    Instrument: flexible fiberoptic nasal endoscope      Scope location: bilateral nare        Assessment/Plan   Diagnoses and all orders for this visit:  Non-seasonal allergic rhinitis due to other allergic trigger (Primary)  -     fluticasone (Flonase) 50 mcg/actuation nasal spray; Administer 2 sprays into each nostril once daily. Shake gently. Before first use, prime pump. After use, clean tip and replace cap.  -     azelastine (Astelin) 137 mcg (0.1 %) nasal spray; Administer 2 sprays into each nostril 2 times a day.  Dysfunction of both eustachian tubes  Bilateral impacted cerumen  Other orders  -     Nasopharyngoscopy     1.  Left chronic serous otitis media and bilateral eustachian tube dysfunction  with resolution of the fluid from the middle ear and bilateral cerumen impaction cleared today.  The patient was advised to continue performing the Valsalva maneuver on a daily basis.  2.  Chronic allergic rhinitis with recent exacerbation of the allergy symptoms.  The patient was started on Astelin in combination with the Flonase.  She was also advised to stop smoking.  She will follow-up in 6 months.

## 2024-02-13 NOTE — PROGRESS NOTES
"Subjective   Patient ID: Dyana Irene is a 68 y.o. female who presents for Cough.    Follow-up visit.  Complains of persistent cough postnasal drip, clear mucus for the past 8 days.  Recently sputum is yellowish.  She takes Robitussin DM over-the-counter.  Has had similar episode of cough beginning of January.  She still smokes few cigarettes a day.    Cough         Review of Systems   Respiratory:  Positive for cough.    All other systems reviewed and are negative.      Objective   /76 (BP Location: Left arm, Patient Position: Sitting)   Pulse 89   Temp 36.1 °C (96.9 °F) (Temporal)   Ht 1.673 m (5' 5.87\")   Wt 112 kg (246 lb)   SpO2 98%   BMI 39.87 kg/m²     Physical Exam  Constitutional:       Appearance: Normal appearance. She is obese.   HENT:      Head: Normocephalic and atraumatic.      Right Ear: External ear normal.      Left Ear: External ear normal.      Mouth/Throat:      Mouth: Mucous membranes are moist.      Pharynx: Oropharynx is clear.   Neck:      Vascular: No carotid bruit.   Cardiovascular:      Rate and Rhythm: Normal rate and regular rhythm.      Heart sounds: No murmur heard.     No gallop.   Pulmonary:      Effort: Pulmonary effort is normal. No respiratory distress.      Breath sounds: No wheezing or rales.      Comments: Decreased breath sounds  Abdominal:      General: Abdomen is flat.      Palpations: Abdomen is soft.      Hernia: No hernia is present.   Musculoskeletal:         General: No swelling, tenderness, deformity or signs of injury.      Cervical back: No rigidity or tenderness.      Right lower leg: No edema.   Lymphadenopathy:      Cervical: No cervical adenopathy.   Skin:     Coloration: Skin is not jaundiced or pale.      Findings: No bruising, erythema, lesion or rash.   Neurological:      General: No focal deficit present.      Mental Status: She is oriented to person, place, and time.      Motor: No weakness.      Coordination: Coordination normal. "   Psychiatric:         Mood and Affect: Mood normal.         Behavior: Behavior normal.         Assessment/Plan   Problem List Items Addressed This Visit             ICD-10-CM    Mixed hyperlipidemia E78.2    Relevant Orders    Lipid Panel    Benign essential hypertension I10     Hypertension : controlled blood pressure and continues same medications and educate a low salt diet do not exceed 200 mg per serving. Keep monitor the blood pressure at home.             Relevant Orders    Comprehensive Metabolic Panel    Persistent cough - Primary R05.3     Have chest x-ray.  Start doxycycline 100 mg twice a day for 7 days.  Use fluticasone nasal spray 1 spray each nostril at bedtime.  Call if not better in 7 to 10 days.  Strongly advised to quit smoking.         Relevant Medications    doxycycline (Vibramycin) 100 mg capsule    Other Relevant Orders    XR chest 2 views    Post-nasal drip R09.82     Use fluticasone nasal spray 1 spray each nostril at bedtime         Relevant Medications    fluticasone (Flonase) 50 mcg/actuation nasal spray     Other Visit Diagnoses         Codes    Anemia, unspecified type     D64.9    Relevant Orders    CBC and Auto Differential

## 2024-02-13 NOTE — ASSESSMENT & PLAN NOTE
Have chest x-ray.  Start doxycycline 100 mg twice a day for 7 days.  Use fluticasone nasal spray 1 spray each nostril at bedtime.  Call if not better in 7 to 10 days.  Strongly advised to quit smoking.

## 2024-02-15 ENCOUNTER — TREATMENT (OUTPATIENT)
Dept: PHYSICAL THERAPY | Facility: CLINIC | Age: 69
End: 2024-02-15
Payer: MEDICARE

## 2024-02-15 DIAGNOSIS — M54.41 CHRONIC BILATERAL LOW BACK PAIN WITH BILATERAL SCIATICA: Primary | ICD-10-CM

## 2024-02-15 DIAGNOSIS — M54.42 CHRONIC BILATERAL LOW BACK PAIN WITH BILATERAL SCIATICA: Primary | ICD-10-CM

## 2024-02-15 DIAGNOSIS — G89.29 CHRONIC BILATERAL LOW BACK PAIN WITH BILATERAL SCIATICA: Primary | ICD-10-CM

## 2024-02-15 PROCEDURE — 97140 MANUAL THERAPY 1/> REGIONS: CPT | Mod: GP

## 2024-02-15 PROCEDURE — 97110 THERAPEUTIC EXERCISES: CPT | Mod: GP

## 2024-02-15 ASSESSMENT — PAIN SCALES - GENERAL: PAINLEVEL_OUTOF10: 7

## 2024-02-15 ASSESSMENT — PAIN - FUNCTIONAL ASSESSMENT: PAIN_FUNCTIONAL_ASSESSMENT: 0-10

## 2024-02-15 NOTE — PROGRESS NOTES
"  Physical Therapy Treatment    Patient Name: Dyana Irene  MRN: [id number]  Today's Date: 2/15/2024  Time Calculation  Start Time: 1000  Stop Time: 1045  Time Calculation (min): 45 min  PT Therapeutic Procedures Time Entry  Manual Therapy Time Entry: 15  Therapeutic Exercise Time Entry: 30,      Current Problem  1. Chronic bilateral low back pain with bilateral sciatica            Insurance:  Number of Treatments Authorized: V#3/mn; (seen 9 x in 2023 this episode)  Certification Period Start Date: 01/30/24  Certification Period End Date: 03/30/24    Subjective   General  General Comment: Pt cont to be treated for ongoing illness- now on ATB; sleep in a recliner and got a new one- has been difficult getting used to it; brought in 2 trekking poles- ? right ones, ? how to use      Performing HEP?: Partially  - not as much while ill   Precautions  Precautions  Precautions Comment: mod fall risk  Pain  Pain Assessment: 0-10  Pain Score: 7  Pain Location: Back  Pain Orientation: Lower  Pain Radiating Towards: BLE    Objective   Posture erect using trekking poles, improved stride/amy  Very tender R>L LS eris, piri/ITB, BLE tight      Treatments:  Therapeutic Exercise  Therapeutic Exercise Activity 1: SciFit [place] LE L1 x 6 min  Therapeutic Exercise Activity 2: incline g/s stretch x 1 min  Therapeutic Exercise Activity 3: HT raises x 1 min  Therapeutic Exercise Activity 4: R/L HS stretch x 30\" ea  Therapeutic Exercise Activity 5: seated w/ UE on SB: FF x 1 min and R/L SB x 1 min  Therapeutic Exercise Activity 6: R/L ALT hip abd taps and flex tap stool x 1' ea  Therapeutic Exercise Activity 7: R/L hip ext taps x 1' ALT  Therapeutic Exercise Activity 8: sit to stand w/o  UE assist x 10  Therapeutic Exercise Activity 9: standing trunk ext to neutral 5 sec x 1'   Therapeutic Exercise Activity 10: SB DKTC x 10  Therapeutic Exercise Activity 11: SB TR x 10    Gait instruction/practice w/ trekking poles- proper use 4 x 100 " ft w/ SBA     Manual Therapy  MFR R/L leg pull, gentle and w/ opp knee flexed   Manual Therapy Activity 1: PROM B hips and knees- hold OP R knee   Manual Therapy Activity 2: stretch B HS, gluts, ITB, R psoas  Manual Therapy Activity 3: STM B LS para, SI, gluts, ITB                                    OP EDUCATION:       Assessment:  PT Assessment  Assessment Comment: Pt moving slowly/cautiously- c/o pain back and BLE josee w/ WB ex; more erect posture walking w/ trekking poles and advised using more frequently, pt admits to sitting for long periods- encouraged getting up and walking at least 5 min every half hour to hour; very tender to palp B LS/piri and ITB; pt plans to seek pain mgt input as pain most limiting factor to progress    Plan:  OP PT Plan  PT Plan: Skilled PT  Onset Date: 07/01/23  Certification Period Start Date: 01/30/24  Certification Period End Date: 03/30/24  Number of Treatments Authorized: V#3/mn; (seen 9 x in 2023 this episode)  Cont Rx for ROM/flexibility, sx mgt, soft tissue mgt, gait/balance and transfer training, mm re-ed, core stabilization, HEP w/ progression, posture, body mechanics and safety training so that pt can perform daily functional activities w/ less pain, more safely and w/ greater ease    Goals:  Active       PT Problem       PT Goal 1 (Progressing)       Start:  11/06/23    Expected End:  02/29/24       STG(4 wks):  - Pt. will have at least 10% increase in flexibility of affected joints/structures being treated- back/LE- met  - Pt will have improved postural awareness for daily activities and exercises as evidenced by self corrections for improper positions/movements- progressing  - Pt. will be able to perform safe and independent transfers/bed mobility with less effort- met/progressing  - Pt will be independent and compliant with HEP- met  - Pt will have return of at least 50% AROM of lumbar spine- progressing         PT Goal 2 (Progressing)       Start:  11/06/23    Expected  End:  03/30/24         LTG( 10 wks)  - Progress HEP and RONNA within pt tolerance to goal attainment, including return to ADL/IADL and functional activities w/ less pain  - Pt. will have improved balance as evidenced by increased SLS time of at least 10 sec for improved everyday balance and less fall potential  - Pt. will have safe, independent ambulation without assistive devices for community distances and stairs/curbs with normalized gait pattern and erect posture  - Pt. will have reduction of pain to no greater than 3 of 10 with ex. and activities  - Pt. will have increased strength of at least 1/3 muscle grade in affected muscle groups- core/LB support mm  - Pt. will have at least 10% improvement in functional test score- ELSIE-as evidenced by self report of improved ability to perform every day activities w/ greater ease and less pain  - Pt will be supervised and a gait belt will be used if needed to assure safety in the clinic during treatment                  Maritza Damico, PT

## 2024-02-19 ENCOUNTER — TREATMENT (OUTPATIENT)
Dept: PHYSICAL THERAPY | Facility: CLINIC | Age: 69
End: 2024-02-19
Payer: MEDICARE

## 2024-02-19 DIAGNOSIS — M54.42 CHRONIC BILATERAL LOW BACK PAIN WITH BILATERAL SCIATICA: Primary | ICD-10-CM

## 2024-02-19 DIAGNOSIS — G89.29 CHRONIC BILATERAL LOW BACK PAIN WITH BILATERAL SCIATICA: Primary | ICD-10-CM

## 2024-02-19 DIAGNOSIS — M54.41 CHRONIC BILATERAL LOW BACK PAIN WITH BILATERAL SCIATICA: Primary | ICD-10-CM

## 2024-02-19 PROCEDURE — 97140 MANUAL THERAPY 1/> REGIONS: CPT | Mod: GP | Performed by: PHYSICAL THERAPIST

## 2024-02-19 PROCEDURE — 97110 THERAPEUTIC EXERCISES: CPT | Mod: GP | Performed by: PHYSICAL THERAPIST

## 2024-02-19 ASSESSMENT — PAIN SCALES - GENERAL: PAINLEVEL_OUTOF10: 5 - MODERATE PAIN

## 2024-02-19 ASSESSMENT — PAIN - FUNCTIONAL ASSESSMENT: PAIN_FUNCTIONAL_ASSESSMENT: 0-10

## 2024-02-24 ENCOUNTER — LAB (OUTPATIENT)
Dept: LAB | Facility: LAB | Age: 69
End: 2024-02-24
Payer: MEDICARE

## 2024-02-24 DIAGNOSIS — D64.9 ANEMIA, UNSPECIFIED TYPE: ICD-10-CM

## 2024-02-24 DIAGNOSIS — E78.2 MIXED HYPERLIPIDEMIA: ICD-10-CM

## 2024-02-24 DIAGNOSIS — I10 BENIGN ESSENTIAL HYPERTENSION: ICD-10-CM

## 2024-02-24 LAB
ALBUMIN SERPL BCP-MCNC: 3.9 G/DL (ref 3.4–5)
ALP SERPL-CCNC: 67 U/L (ref 33–136)
ALT SERPL W P-5'-P-CCNC: 18 U/L (ref 7–45)
ANION GAP SERPL CALC-SCNC: 12 MMOL/L (ref 10–20)
AST SERPL W P-5'-P-CCNC: 23 U/L (ref 9–39)
BASOPHILS # BLD AUTO: 0.04 X10*3/UL (ref 0–0.1)
BASOPHILS NFR BLD AUTO: 0.5 %
BILIRUB SERPL-MCNC: 0.7 MG/DL (ref 0–1.2)
BUN SERPL-MCNC: 17 MG/DL (ref 6–23)
CALCIUM SERPL-MCNC: 10.1 MG/DL (ref 8.6–10.6)
CHLORIDE SERPL-SCNC: 105 MMOL/L (ref 98–107)
CHOLEST SERPL-MCNC: 169 MG/DL (ref 0–199)
CHOLESTEROL/HDL RATIO: 3.4
CO2 SERPL-SCNC: 32 MMOL/L (ref 21–32)
CREAT SERPL-MCNC: 1 MG/DL (ref 0.5–1.05)
EGFRCR SERPLBLD CKD-EPI 2021: 61 ML/MIN/1.73M*2
EOSINOPHIL # BLD AUTO: 0.15 X10*3/UL (ref 0–0.7)
EOSINOPHIL NFR BLD AUTO: 1.8 %
ERYTHROCYTE [DISTWIDTH] IN BLOOD BY AUTOMATED COUNT: 13.7 % (ref 11.5–14.5)
GLUCOSE SERPL-MCNC: 108 MG/DL (ref 74–99)
HCT VFR BLD AUTO: 46 % (ref 36–46)
HDLC SERPL-MCNC: 50.1 MG/DL
HGB BLD-MCNC: 15.4 G/DL (ref 12–16)
IMM GRANULOCYTES # BLD AUTO: 0.02 X10*3/UL (ref 0–0.7)
IMM GRANULOCYTES NFR BLD AUTO: 0.2 % (ref 0–0.9)
LDLC SERPL CALC-MCNC: 86 MG/DL
LYMPHOCYTES # BLD AUTO: 1.96 X10*3/UL (ref 1.2–4.8)
LYMPHOCYTES NFR BLD AUTO: 22.9 %
MCH RBC QN AUTO: 30.3 PG (ref 26–34)
MCHC RBC AUTO-ENTMCNC: 33.5 G/DL (ref 32–36)
MCV RBC AUTO: 90 FL (ref 80–100)
MONOCYTES # BLD AUTO: 0.67 X10*3/UL (ref 0.1–1)
MONOCYTES NFR BLD AUTO: 7.8 %
NEUTROPHILS # BLD AUTO: 5.73 X10*3/UL (ref 1.2–7.7)
NEUTROPHILS NFR BLD AUTO: 66.8 %
NON HDL CHOLESTEROL: 119 MG/DL (ref 0–149)
NRBC BLD-RTO: 0 /100 WBCS (ref 0–0)
PLATELET # BLD AUTO: 179 X10*3/UL (ref 150–450)
POTASSIUM SERPL-SCNC: 3.9 MMOL/L (ref 3.5–5.3)
PROT SERPL-MCNC: 6.9 G/DL (ref 6.4–8.2)
RBC # BLD AUTO: 5.09 X10*6/UL (ref 4–5.2)
SODIUM SERPL-SCNC: 145 MMOL/L (ref 136–145)
TRIGL SERPL-MCNC: 164 MG/DL (ref 0–149)
VLDL: 33 MG/DL (ref 0–40)
WBC # BLD AUTO: 8.6 X10*3/UL (ref 4.4–11.3)

## 2024-02-24 PROCEDURE — 80061 LIPID PANEL: CPT

## 2024-02-24 PROCEDURE — 85025 COMPLETE CBC W/AUTO DIFF WBC: CPT

## 2024-02-24 PROCEDURE — 36415 COLL VENOUS BLD VENIPUNCTURE: CPT

## 2024-02-24 PROCEDURE — 80053 COMPREHEN METABOLIC PANEL: CPT

## 2024-02-26 ENCOUNTER — OFFICE VISIT (OUTPATIENT)
Dept: CARDIOLOGY | Facility: CLINIC | Age: 69
End: 2024-02-26
Payer: MEDICARE

## 2024-02-27 ENCOUNTER — APPOINTMENT (OUTPATIENT)
Dept: OPERATING ROOM | Facility: CLINIC | Age: 69
End: 2024-02-27
Payer: MEDICARE

## 2024-02-29 ENCOUNTER — APPOINTMENT (OUTPATIENT)
Dept: PHYSICAL THERAPY | Facility: CLINIC | Age: 69
End: 2024-02-29
Payer: MEDICARE

## 2024-03-04 ENCOUNTER — TREATMENT (OUTPATIENT)
Dept: PHYSICAL THERAPY | Facility: CLINIC | Age: 69
End: 2024-03-04
Payer: MEDICARE

## 2024-03-04 DIAGNOSIS — M54.41 CHRONIC BILATERAL LOW BACK PAIN WITH BILATERAL SCIATICA: Primary | ICD-10-CM

## 2024-03-04 DIAGNOSIS — M54.42 CHRONIC BILATERAL LOW BACK PAIN WITH BILATERAL SCIATICA: Primary | ICD-10-CM

## 2024-03-04 DIAGNOSIS — G89.29 CHRONIC BILATERAL LOW BACK PAIN WITH BILATERAL SCIATICA: Primary | ICD-10-CM

## 2024-03-04 PROCEDURE — 97140 MANUAL THERAPY 1/> REGIONS: CPT | Mod: GP

## 2024-03-04 PROCEDURE — 97110 THERAPEUTIC EXERCISES: CPT | Mod: GP

## 2024-03-04 ASSESSMENT — PAIN SCALES - GENERAL: PAINLEVEL_OUTOF10: 4

## 2024-03-04 ASSESSMENT — PAIN - FUNCTIONAL ASSESSMENT: PAIN_FUNCTIONAL_ASSESSMENT: 0-10

## 2024-03-04 NOTE — PROGRESS NOTES
"  Physical Therapy Treatment    Patient Name: Dyana Irene  MRN: [id number]  Today's Date: 3/4/2024  Time Calculation  Start Time: 1216  Stop Time: 1300  Time Calculation (min): 44 min  PT Therapeutic Procedures Time Entry  Manual Therapy Time Entry: 14  Therapeutic Exercise Time Entry: 30,      Current Problem  1. Chronic bilateral low back pain with bilateral sciatica            Insurance:  Number of Treatments Authorized: V#5/mn; (seen 9 x in 2023 this episode)  Certification Period Start Date: 01/30/24  Certification Period End Date: 03/30/24    Subjective   General  General Comment: Pt reports she missed last week- woke up in severe pain, \"shooting\" down BLE- saw MD and was put on prednisone dose pack, radiating pain is much better; to f/u again w/ MD after steroids done        Performing HEP?: Partially      Precautions  Precautions  Precautions Comment: mod fall risk  Pain  Pain Assessment: 0-10  Pain Score: 4  Pain Location: Back  Pain Orientation: Lower  Pain Radiating Towards: BLE- less w/ steroids    Objective   FWD flexed posture gait pattern w/o device  Less palpable tenderness LS/gluts, pain w/ rolling onto sides    Treatments:  Therapeutic Exercise  Therapeutic Exercise Activity 1: scifit LE hills L 1 x 5 minutes  Therapeutic Exercise Activity 2: Heel/toe raises x 1 min  Therapeutic Exercise Activity 3: Incline gastroc stretch x 1 min  Therapeutic Exercise Activity 4: standing R,L hamstring stretch on step x 1'   Therapeutic Exercise Activity 5: R/L march with UE support x 1 min- tap stool  Therapeutic Exercise Activity 6: R,L hip abduction x 1 min  Therapeutic Exercise Activity 7: R/L alt hip extension with tap x 1 min  Therapeutic Exercise Activity 8: Seated lumbar flexion with yellow swiss ball x 2 min  Therapeutic Exercise Activity 9: Seated lumbar rotations with yellow swiss ball x 1 min  Therapeutic Exercise Activity 10: supine SB TR x 10  Therapeutic Exercise Activity 11: SB DKTC x " "10  Therapeutic Exercise Activity 12: B glute sets 5\" H/L x 1 min  Therapeutic Exercise Activity 14: TA activation with R/L alt march x 1 min     Manual Therapy  Manual Therapy Activity 1: STM B LS para, SI, gluts, ITB in R/L S/L positions  PROM B hips  Stretch B HS, adds, piri/gluts, psoas, ITB, lats, GS                                  OP EDUCATION:     - resume HEP as sx allow    Assessment:  PT Assessment  Assessment Comment: Pt had setback due to onset of severe radiating pain- has eased since onset w/ steroid use, able to angelica much of her Rx program today w/ report of only LBP sx w/o radiation, advised pt to resume HEP and pt plans to start transitioning back to workouts at [employer] incl chair yoga    Plan:  OP PT Plan  Treatment/Interventions: Education/ Instruction, Electrical stimulation, Gait training, Hot pack, Manual therapy, Neuromuscular re-education, Therapeutic exercises (modalities prn)  PT Plan: Skilled PT  Onset Date: 07/01/23  Certification Period Start Date: 01/30/24  Certification Period End Date: 03/30/24  Number of Treatments Authorized: V#5/mn; (seen 9 x in 2023 this episode)    Goals:  Active       PT Problem       PT Goal 1 (Progressing)       Start:  11/06/23    Expected End:  02/29/24       STG(4 wks):  - Pt. will have at least 10% increase in flexibility of affected joints/structures being treated- back/LE- met  - Pt will have improved postural awareness for daily activities and exercises as evidenced by self corrections for improper positions/movements- progressing  - Pt. will be able to perform safe and independent transfers/bed mobility with less effort- met/progressing  - Pt will be independent and compliant with HEP- met  - Pt will have return of at least 50% AROM of lumbar spine- progressing         PT Goal 2 (Progressing)       Start:  11/06/23    Expected End:  03/30/24         LTG( 10 wks)  - Progress HEP and RONNA within pt tolerance to goal attainment, including return to " ADL/IADL and functional activities w/ less pain  - Pt. will have improved balance as evidenced by increased SLS time of at least 10 sec for improved everyday balance and less fall potential  - Pt. will have safe, independent ambulation without assistive devices for community distances and stairs/curbs with normalized gait pattern and erect posture  - Pt. will have reduction of pain to no greater than 3 of 10 with ex. and activities  - Pt. will have increased strength of at least 1/3 muscle grade in affected muscle groups- core/LB support mm  - Pt. will have at least 10% improvement in functional test score- ELSIE-as evidenced by self report of improved ability to perform every day activities w/ greater ease and less pain  - Pt will be supervised and a gait belt will be used if needed to assure safety in the clinic during treatment                  Maritza Damico, PT

## 2024-03-12 ENCOUNTER — APPOINTMENT (OUTPATIENT)
Dept: PHYSICAL THERAPY | Facility: CLINIC | Age: 69
End: 2024-03-12
Payer: MEDICARE

## 2024-03-19 ENCOUNTER — APPOINTMENT (OUTPATIENT)
Dept: PHYSICAL THERAPY | Facility: CLINIC | Age: 69
End: 2024-03-19
Payer: MEDICARE

## 2024-03-25 PROBLEM — E78.5 HYPERLIPIDEMIA: Status: RESOLVED | Noted: 2023-09-06 | Resolved: 2024-03-25

## 2024-03-25 NOTE — ASSESSMENT & PLAN NOTE
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  Will continue the atorvastatin 80 mg daily.

## 2024-03-25 NOTE — ASSESSMENT & PLAN NOTE
Dr. Jeffery checked CMP last month:  K+ 3.9 Creat 1.0 (GFR 61).  Blood pressure currently well-controlled on current regimen.

## 2024-03-26 ENCOUNTER — APPOINTMENT (OUTPATIENT)
Dept: PHYSICAL THERAPY | Facility: CLINIC | Age: 69
End: 2024-03-26
Payer: MEDICARE

## 2024-03-26 ENCOUNTER — OFFICE VISIT (OUTPATIENT)
Dept: CARDIOLOGY | Facility: CLINIC | Age: 69
End: 2024-03-26
Payer: MEDICARE

## 2024-03-26 VITALS — SYSTOLIC BLOOD PRESSURE: 110 MMHG | HEART RATE: 72 BPM | DIASTOLIC BLOOD PRESSURE: 72 MMHG

## 2024-03-26 DIAGNOSIS — F17.210 TOBACCO DEPENDENCE DUE TO CIGARETTES: ICD-10-CM

## 2024-03-26 DIAGNOSIS — I10 BENIGN ESSENTIAL HYPERTENSION: ICD-10-CM

## 2024-03-26 DIAGNOSIS — I34.0 MITRAL VALVE INSUFFICIENCY, UNSPECIFIED ETIOLOGY: ICD-10-CM

## 2024-03-26 DIAGNOSIS — R93.1 ELEVATED CORONARY ARTERY CALCIUM SCORE: Primary | ICD-10-CM

## 2024-03-26 DIAGNOSIS — E78.2 MIXED HYPERLIPIDEMIA: ICD-10-CM

## 2024-03-26 PROCEDURE — 1125F AMNT PAIN NOTED PAIN PRSNT: CPT | Performed by: INTERNAL MEDICINE

## 2024-03-26 PROCEDURE — 3074F SYST BP LT 130 MM HG: CPT | Performed by: INTERNAL MEDICINE

## 2024-03-26 PROCEDURE — 1159F MED LIST DOCD IN RCRD: CPT | Performed by: INTERNAL MEDICINE

## 2024-03-26 PROCEDURE — 99214 OFFICE O/P EST MOD 30 MIN: CPT | Performed by: INTERNAL MEDICINE

## 2024-03-26 PROCEDURE — 1157F ADVNC CARE PLAN IN RCRD: CPT | Performed by: INTERNAL MEDICINE

## 2024-03-26 PROCEDURE — 3078F DIAST BP <80 MM HG: CPT | Performed by: INTERNAL MEDICINE

## 2024-03-26 RX ORDER — VARENICLINE TARTRATE 1 MG/1
1 TABLET, FILM COATED ORAL 2 TIMES DAILY
Qty: 60 TABLET | Refills: 2 | Status: SHIPPED | OUTPATIENT
Start: 2024-03-26 | End: 2024-03-29 | Stop reason: SDUPTHER

## 2024-03-26 RX ORDER — IBUPROFEN 100 MG/5ML
1000 SUSPENSION, ORAL (FINAL DOSE FORM) ORAL DAILY
COMMUNITY

## 2024-03-26 RX ORDER — VARENICLINE TARTRATE 0.5 (11)-1
KIT ORAL
Qty: 1 EACH | Refills: 0 | Status: SHIPPED | OUTPATIENT
Start: 2024-03-26 | End: 2024-06-24

## 2024-03-26 ASSESSMENT — PATIENT HEALTH QUESTIONNAIRE - PHQ9
SUM OF ALL RESPONSES TO PHQ9 QUESTIONS 1 & 2: 0
1. LITTLE INTEREST OR PLEASURE IN DOING THINGS: NOT AT ALL
1. LITTLE INTEREST OR PLEASURE IN DOING THINGS: NOT AT ALL
SUM OF ALL RESPONSES TO PHQ9 QUESTIONS 1 AND 2: 0
2. FEELING DOWN, DEPRESSED OR HOPELESS: NOT AT ALL
2. FEELING DOWN, DEPRESSED OR HOPELESS: NOT AT ALL

## 2024-03-26 ASSESSMENT — ENCOUNTER SYMPTOMS
DYSURIA: 0
DYSPNEA ON EXERTION: 0
SHORTNESS OF BREATH: 1
HEMATURIA: 0
PALPITATIONS: 0
OCCASIONAL FEELINGS OF UNSTEADINESS: 1
ABDOMINAL PAIN: 0
DEPRESSION: 0
PARESTHESIAS: 0
LOSS OF SENSATION IN FEET: 0
BLURRED VISION: 0
NUMBNESS: 0
COUGH: 1

## 2024-03-26 ASSESSMENT — PAIN SCALES - GENERAL: PAINLEVEL: 6

## 2024-03-26 NOTE — ASSESSMENT & PLAN NOTE
Smoking cessation has been encouraged.  Will prescribe Chantix to aid in smoking cessation.  She states she will try to stop on her upcoming birthday.

## 2024-03-26 NOTE — PROGRESS NOTES
Subjective   Dyana Irene is a 68 y.o. female.    Chief Complaint:  Follow-up    HPI  Patient states that she is doing okay.  Still with chronic cough which she associates with her cigarette smoking and would like to stop.    Review of Systems   Constitutional: Negative for malaise/fatigue.   HENT:  Negative for congestion.    Eyes:  Negative for blurred vision.   Cardiovascular:  Negative for chest pain, dyspnea on exertion and palpitations.   Respiratory:  Positive for cough and shortness of breath.    Musculoskeletal:  Negative for joint pain.   Gastrointestinal:  Negative for abdominal pain.   Genitourinary:  Negative for dysuria and hematuria.   Neurological:  Negative for numbness and paresthesias.       Objective   Constitutional:       Appearance: Not in distress.   Eyes:      Conjunctiva/sclera: Conjunctivae normal.   Neck:      Vascular: JVD normal.   Pulmonary:      Breath sounds: Normal breath sounds. No wheezing. No rhonchi. No rales.   Cardiovascular:      Normal rate. Regular rhythm.      Murmurs: There is no murmur.      No gallop.  No click. No rub.   Abdominal:      Palpations: Abdomen is soft.   Neurological:      General: No focal deficit present.      Mental Status: Alert.         Lab Review:   Lab on 02/24/2024   Component Date Value    WBC 02/24/2024 8.6     nRBC 02/24/2024 0.0     RBC 02/24/2024 5.09     Hemoglobin 02/24/2024 15.4     Hematocrit 02/24/2024 46.0     MCV 02/24/2024 90     MCH 02/24/2024 30.3     MCHC 02/24/2024 33.5     RDW 02/24/2024 13.7     Platelets 02/24/2024 179     Neutrophils % 02/24/2024 66.8     Immature Granulocytes %,* 02/24/2024 0.2     Lymphocytes % 02/24/2024 22.9     Monocytes % 02/24/2024 7.8     Eosinophils % 02/24/2024 1.8     Basophils % 02/24/2024 0.5     Neutrophils Absolute 02/24/2024 5.73     Immature Granulocytes Ab* 02/24/2024 0.02     Lymphocytes Absolute 02/24/2024 1.96     Monocytes Absolute 02/24/2024 0.67     Eosinophils Absolute 02/24/2024  0.15     Basophils Absolute 02/24/2024 0.04     Glucose 02/24/2024 108 (H)     Sodium 02/24/2024 145     Potassium 02/24/2024 3.9     Chloride 02/24/2024 105     Bicarbonate 02/24/2024 32     Anion Gap 02/24/2024 12     Urea Nitrogen 02/24/2024 17     Creatinine 02/24/2024 1.00     eGFR 02/24/2024 61     Calcium 02/24/2024 10.1     Albumin 02/24/2024 3.9     Alkaline Phosphatase 02/24/2024 67     Total Protein 02/24/2024 6.9     AST 02/24/2024 23     Bilirubin, Total 02/24/2024 0.7     ALT 02/24/2024 18     Cholesterol 02/24/2024 169     HDL-Cholesterol 02/24/2024 50.1     Cholesterol/HDL Ratio 02/24/2024 3.4     LDL Calculated 02/24/2024 86     VLDL 02/24/2024 33     Triglycerides 02/24/2024 164 (H)     Non HDL Cholesterol 02/24/2024 119        Assessment/Plan   The primary encounter diagnosis was Elevated coronary artery calcium score. Diagnoses of Benign essential hypertension, Mixed hyperlipidemia, and Mitral valve insufficiency, unspecified etiology were also pertinent to this visit.    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  Will continue the atorvastatin 80 mg daily.    Benign essential hypertension  Dr. Jeffery checked CMP last month:  K+ 3.9 Creat 1.0 (GFR 61).  Blood pressure currently well-controlled on current regimen.    Tobacco dependence due to cigarettes  Smoking cessation has been encouraged.  Will prescribe Chantix to aid in smoking cessation.  She states she will try to stop on her upcoming birthday.    Mitral valve regurgitation  Will repeat echocardiogram to assess the degree of mitral valve regurgitation.  The patient does have chronic shortness of breath more likely on the basis of underlying lung disease but need to reassess left ventricular systolic function as well as the degree of mitral valve regurgitation.  Call the patient if necessary otherwise review on return visit.    Elevated coronary artery calcium score  Patient  had a negative stress test with Dr. Navarro in the past.  Will continue aggressive risk factor modification.

## 2024-03-26 NOTE — ASSESSMENT & PLAN NOTE
Patient had a negative stress test with Dr. Navarro in the past.  Will continue aggressive risk factor modification.

## 2024-03-26 NOTE — ASSESSMENT & PLAN NOTE
Will repeat echocardiogram to assess the degree of mitral valve regurgitation.  The patient does have chronic shortness of breath more likely on the basis of underlying lung disease but need to reassess left ventricular systolic function as well as the degree of mitral valve regurgitation.  Call the patient if necessary otherwise review on return visit.

## 2024-03-29 DIAGNOSIS — F17.210 TOBACCO DEPENDENCE DUE TO CIGARETTES: ICD-10-CM

## 2024-03-29 RX ORDER — VARENICLINE TARTRATE 1 MG/1
1 TABLET, FILM COATED ORAL 2 TIMES DAILY
Qty: 180 TABLET | Refills: 3 | Status: SHIPPED | OUTPATIENT
Start: 2024-03-29 | End: 2025-03-29

## 2024-04-01 ENCOUNTER — APPOINTMENT (OUTPATIENT)
Dept: PHYSICAL THERAPY | Facility: CLINIC | Age: 69
End: 2024-04-01
Payer: MEDICARE

## 2024-04-01 ENCOUNTER — DOCUMENTATION (OUTPATIENT)
Dept: PHYSICAL THERAPY | Facility: CLINIC | Age: 69
End: 2024-04-01
Payer: MEDICARE

## 2024-04-01 NOTE — PROGRESS NOTES
Cancel Physical Therapy                 Therapy Communication Note    Patient Name: Dyana Irene  MRN: [id number]  Today's Date: 4/1/2024     Discipline: Physical Therapy    Missed Visit Reason:      Missed Time: Cancel    Comment:  - ill per message

## 2024-04-01 NOTE — PROGRESS NOTES
Physical Therapy Treatment/Re Check    Patient Name: Dyana Irene  MRN: [id number]  Today's Date: 4/1/2024      ,      Current Problem  No diagnosis found.    Insurance:             Subjective   General           Performing HEP?: {Yes/No:63367}    Precautions     Pain       Objective   Posture Comment: FWD posture in standing, no lumbar lordosis, no shift- pt can now correct to neutral w/o pain up to 3-4 mins     Range of Motion Comments: LB ext 5 w/ pain, flex 75 w/o pain, R SB very painful to 15, L/R SB mild pain to 20; R hip ROM no longer causes increased pain R LB; R knee lacks full ext- per pt ortho MD not concerned     Strength Comments: core 3+/5- pain; R hip flex 4/5, L 4+/5; R hip abd 3+/5, L 4-/5; B hip add 4/5 pt can fully bridge trunk w/ pain     Flexibility Comment: R HS -30, L -20, R piri 25%, L 10%; R ITB 10%     Palpation Comment: Pt very hypersensitive to palpation; R LS, piri/glut, ITB > L tender   R medial knee distal to joint line also very tender  Special Tests Comment: (-) slump; (-) SLR  Functional Assessments:  Gait Comment: Pt walks w/ flexed trunk, slow gait pattern w/o devices- can stand/walk erect for short distances w/ reminder   , Balance Comment: SLS R = 3 sec, L = 8 sec     , Bed Mobility Comment: pt w/ less effort to roll, perform supine to sit- remains painful  , and Transfers Comment: pt can perform repeated sit to stand w/o use of UE      Outcome Measures:  Other Measures  Oswestry Disablity Index (ELSIE): 38%  Outcome Measures:  {PT Outcome Measures:09228}    Treatments:  Therapeutic Exercise  Therapeutic Exercise Activity 1: scifit LE hills L 1 x 5 minutes  Therapeutic Exercise Activity 2: Heel/toe raises x 1 min  Therapeutic Exercise Activity 3: Incline gastroc stretch x 1 min  Therapeutic Exercise Activity 4: standing R,L hamstring stretch on step x 1'   Therapeutic Exercise Activity 5: R/L march with UE support x 1 min- tap stool  Therapeutic Exercise Activity 6: R,L hip  "abduction x 1 min  Therapeutic Exercise Activity 7: R/L alt hip extension with tap x 1 min  Therapeutic Exercise Activity 8: Seated lumbar flexion with yellow swiss ball x 2 min  Therapeutic Exercise Activity 9: Seated lumbar rotations with yellow swiss ball x 1 min  Therapeutic Exercise Activity 10: supine SB TR x 10  Therapeutic Exercise Activity 11: SB DKTC x 10  Therapeutic Exercise Activity 12: B glute sets 5\" H/L x 1 min  Therapeutic Exercise Activity 14: TA activation with R/L alt march x 1 min     Manual Therapy  Manual Therapy Activity 1: STM B LS para, SI, gluts, ITB in R/L S/L positions  PROM B hips  Stretch B HS, adds, piri/gluts, psoas, ITB, lats, GS                                   OP EDUCATION:       Assessment:       Plan:       Goals:  Active       PT Problem       PT Goal 1 (Progressing)       Start:  11/06/23    Expected End:  02/29/24       STG(4 wks):  - Pt. will have at least 10% increase in flexibility of affected joints/structures being treated- back/LE- met  - Pt will have improved postural awareness for daily activities and exercises as evidenced by self corrections for improper positions/movements- progressing  - Pt. will be able to perform safe and independent transfers/bed mobility with less effort- met/progressing  - Pt will be independent and compliant with HEP- met  - Pt will have return of at least 50% AROM of lumbar spine- progressing         PT Goal 2 (Progressing)       Start:  11/06/23    Expected End:  03/30/24         LTG( 10 wks)  - Progress HEP and RONNA within pt tolerance to goal attainment, including return to ADL/IADL and functional activities w/ less pain  - Pt. will have improved balance as evidenced by increased SLS time of at least 10 sec for improved everyday balance and less fall potential  - Pt. will have safe, independent ambulation without assistive devices for community distances and stairs/curbs with normalized gait pattern and erect posture  - Pt. will have " reduction of pain to no greater than 3 of 10 with ex. and activities  - Pt. will have increased strength of at least 1/3 muscle grade in affected muscle groups- core/LB support mm  - Pt. will have at least 10% improvement in functional test score- ELSIE-as evidenced by self report of improved ability to perform every day activities w/ greater ease and less pain  - Pt will be supervised and a gait belt will be used if needed to assure safety in the clinic during treatment                  Maritza Damico, PT

## 2024-04-02 ENCOUNTER — DOCUMENTATION (OUTPATIENT)
Dept: PHYSICAL THERAPY | Facility: CLINIC | Age: 69
End: 2024-04-02
Payer: MEDICARE

## 2024-04-02 NOTE — PROGRESS NOTES
Discharge Summary    Name: Dyana Irene  MRN: [id number]  : 1955  Date: 24    Discharge Summary: PT    Discharge Information: Date of discharge 24, Date of last visit 3/4/24, Date of evaluation 23, Number of attended visits 5 in , 14 total, Referred by Dr Garza, and Referred for back pain/difficulty walking    Therapy Summary: Pt made slow progress w/ Rx, improved status overall though several illnesses made it difficult for pt to attend Rx    Discharge Status: NA, called pt and left message re: DC, to call if questions      Rehab Discharge Reason: Attendance inconsistent and Failed to schedule and/or keep follow-up appointment(s)

## 2024-04-11 ENCOUNTER — OFFICE VISIT (OUTPATIENT)
Dept: GASTROENTEROLOGY | Facility: EXTERNAL LOCATION | Age: 69
End: 2024-04-11
Payer: MEDICARE

## 2024-04-11 DIAGNOSIS — Z86.010 PERSONAL HISTORY OF COLONIC POLYPS: ICD-10-CM

## 2024-04-11 DIAGNOSIS — D12.3 BENIGN NEOPLASM OF TRANSVERSE COLON: ICD-10-CM

## 2024-04-11 DIAGNOSIS — Z12.11 COLON CANCER SCREENING: Primary | ICD-10-CM

## 2024-04-11 DIAGNOSIS — K64.8 INTERNAL HEMORRHOIDS: ICD-10-CM

## 2024-04-11 DIAGNOSIS — K57.30 DIVERTICULOSIS OF LARGE INTESTINE WITHOUT DIVERTICULITIS: ICD-10-CM

## 2024-04-11 DIAGNOSIS — D12.2 BENIGN NEOPLASM OF ASCENDING COLON: ICD-10-CM

## 2024-04-11 PROCEDURE — 1157F ADVNC CARE PLAN IN RCRD: CPT | Performed by: INTERNAL MEDICINE

## 2024-04-11 PROCEDURE — 88305 TISSUE EXAM BY PATHOLOGIST: CPT | Performed by: STUDENT IN AN ORGANIZED HEALTH CARE EDUCATION/TRAINING PROGRAM

## 2024-04-11 PROCEDURE — 88305 TISSUE EXAM BY PATHOLOGIST: CPT

## 2024-04-11 PROCEDURE — 1159F MED LIST DOCD IN RCRD: CPT | Performed by: INTERNAL MEDICINE

## 2024-04-11 PROCEDURE — 45385 COLONOSCOPY W/LESION REMOVAL: CPT | Performed by: INTERNAL MEDICINE

## 2024-04-11 PROCEDURE — 45380 COLONOSCOPY AND BIOPSY: CPT | Performed by: INTERNAL MEDICINE

## 2024-04-12 ENCOUNTER — LAB REQUISITION (OUTPATIENT)
Dept: LAB | Facility: HOSPITAL | Age: 69
End: 2024-04-12
Payer: MEDICARE

## 2024-04-17 LAB
LABORATORY COMMENT REPORT: NORMAL
PATH REPORT.FINAL DX SPEC: NORMAL
PATH REPORT.GROSS SPEC: NORMAL
PATH REPORT.RELEVANT HX SPEC: NORMAL
PATH REPORT.TOTAL CANCER: NORMAL

## 2024-04-17 NOTE — RESULT ENCOUNTER NOTE
The polyps removed from your colon were adenomas (benign but precancerous).  The recommendation is to repeat colonoscopy in 5 years.      Rhianna Gutierrez MD

## 2024-04-18 ENCOUNTER — APPOINTMENT (OUTPATIENT)
Dept: CARDIOLOGY | Facility: CLINIC | Age: 69
End: 2024-04-18
Payer: MEDICARE

## 2024-04-24 ENCOUNTER — HOSPITAL ENCOUNTER (OUTPATIENT)
Dept: CARDIOLOGY | Facility: HOSPITAL | Age: 69
Discharge: HOME | End: 2024-04-24
Payer: MEDICARE

## 2024-04-24 DIAGNOSIS — I34.0 MITRAL VALVE INSUFFICIENCY, UNSPECIFIED ETIOLOGY: ICD-10-CM

## 2024-04-24 LAB
AORTIC VALVE MEAN GRADIENT: 5 MMHG
AORTIC VALVE PEAK VELOCITY: 1.41 M/S
AV PEAK GRADIENT: 7.9 MMHG
AVA (PEAK VEL): 1.73 CM2
AVA (VTI): 2.06 CM2
EJECTION FRACTION APICAL 4 CHAMBER: 63.2
LEFT ATRIUM VOLUME AREA LENGTH INDEX BSA: 16.9 ML/M2
LEFT VENTRICULAR OUTFLOW TRACT DIAMETER: 1.96 CM
LV EJECTION FRACTION BIPLANE: 60 %
MITRAL VALVE E/A RATIO: 0.75
MITRAL VALVE E/E' RATIO: 9.78
RIGHT VENTRICLE FREE WALL PEAK S': 14.82 CM/S

## 2024-04-24 PROCEDURE — 93306 TTE W/DOPPLER COMPLETE: CPT

## 2024-04-24 PROCEDURE — 93306 TTE W/DOPPLER COMPLETE: CPT | Performed by: INTERNAL MEDICINE

## 2024-05-26 DIAGNOSIS — I10 ESSENTIAL (PRIMARY) HYPERTENSION: ICD-10-CM

## 2024-05-26 DIAGNOSIS — F51.01 PRIMARY INSOMNIA: ICD-10-CM

## 2024-05-28 RX ORDER — METOPROLOL SUCCINATE 25 MG/1
25 TABLET, EXTENDED RELEASE ORAL DAILY
Qty: 90 TABLET | Refills: 1 | Status: SHIPPED | OUTPATIENT
Start: 2024-05-28

## 2024-05-28 RX ORDER — DULOXETIN HYDROCHLORIDE 60 MG/1
60 CAPSULE, DELAYED RELEASE ORAL DAILY
Qty: 90 CAPSULE | Refills: 1 | Status: SHIPPED | OUTPATIENT
Start: 2024-05-28

## 2024-06-18 ENCOUNTER — HOSPITAL ENCOUNTER (OUTPATIENT)
Dept: RADIOLOGY | Facility: CLINIC | Age: 69
Discharge: HOME | End: 2024-06-18
Payer: MEDICARE

## 2024-06-18 DIAGNOSIS — M54.16 RADICULOPATHY, LUMBAR REGION: ICD-10-CM

## 2024-06-18 PROCEDURE — 72148 MRI LUMBAR SPINE W/O DYE: CPT | Performed by: RADIOLOGY

## 2024-06-18 PROCEDURE — 72148 MRI LUMBAR SPINE W/O DYE: CPT

## 2024-06-19 ENCOUNTER — TELEPHONE (OUTPATIENT)
Dept: PRIMARY CARE | Facility: CLINIC | Age: 69
End: 2024-06-19
Payer: MEDICARE

## 2024-06-19 DIAGNOSIS — I10 BENIGN ESSENTIAL HYPERTENSION: ICD-10-CM

## 2024-06-19 DIAGNOSIS — R73.01 IMPAIRED FASTING GLUCOSE: Primary | ICD-10-CM

## 2024-06-19 DIAGNOSIS — F17.210 CIGARETTE NICOTINE DEPENDENCE WITHOUT COMPLICATION: Primary | ICD-10-CM

## 2024-06-19 DIAGNOSIS — E78.2 MIXED HYPERLIPIDEMIA: ICD-10-CM

## 2024-06-24 DIAGNOSIS — E78.5 HYPERLIPIDEMIA, UNSPECIFIED: ICD-10-CM

## 2024-06-24 RX ORDER — ATORVASTATIN CALCIUM 80 MG/1
80 TABLET, FILM COATED ORAL DAILY
Qty: 90 TABLET | Refills: 1 | Status: SHIPPED | OUTPATIENT
Start: 2024-06-24

## 2024-07-28 DIAGNOSIS — I10 ESSENTIAL (PRIMARY) HYPERTENSION: ICD-10-CM

## 2024-07-29 RX ORDER — CHLORTHALIDONE 25 MG/1
25 TABLET ORAL DAILY
Qty: 90 TABLET | Refills: 1 | Status: SHIPPED | OUTPATIENT
Start: 2024-07-29

## 2024-07-29 RX ORDER — LOSARTAN POTASSIUM 100 MG/1
100 TABLET ORAL DAILY
Qty: 90 TABLET | Refills: 1 | Status: SHIPPED | OUTPATIENT
Start: 2024-07-29

## 2024-08-13 ENCOUNTER — APPOINTMENT (OUTPATIENT)
Dept: OTOLARYNGOLOGY | Facility: CLINIC | Age: 69
End: 2024-08-13
Payer: MEDICARE

## 2024-08-13 DIAGNOSIS — E87.6 HYPOKALEMIA: ICD-10-CM

## 2024-08-13 RX ORDER — POTASSIUM CHLORIDE 1500 MG/1
20 TABLET, EXTENDED RELEASE ORAL DAILY
Qty: 90 TABLET | Refills: 1 | Status: SHIPPED | OUTPATIENT
Start: 2024-08-13

## 2024-08-21 ENCOUNTER — APPOINTMENT (OUTPATIENT)
Dept: PRIMARY CARE | Facility: CLINIC | Age: 69
End: 2024-08-21
Payer: MEDICARE

## 2024-09-03 ENCOUNTER — APPOINTMENT (OUTPATIENT)
Dept: OTOLARYNGOLOGY | Facility: CLINIC | Age: 69
End: 2024-09-03
Payer: MEDICARE

## 2024-09-03 VITALS — BODY MASS INDEX: 39.37 KG/M2 | WEIGHT: 245 LBS | TEMPERATURE: 96.9 F | HEIGHT: 66 IN

## 2024-09-03 DIAGNOSIS — H61.23 BILATERAL IMPACTED CERUMEN: ICD-10-CM

## 2024-09-03 DIAGNOSIS — J30.89 NON-SEASONAL ALLERGIC RHINITIS DUE TO OTHER ALLERGIC TRIGGER: ICD-10-CM

## 2024-09-03 DIAGNOSIS — H69.93 DYSFUNCTION OF BOTH EUSTACHIAN TUBES: Primary | ICD-10-CM

## 2024-09-03 PROCEDURE — 1160F RVW MEDS BY RX/DR IN RCRD: CPT | Performed by: OTOLARYNGOLOGY

## 2024-09-03 PROCEDURE — 1159F MED LIST DOCD IN RCRD: CPT | Performed by: OTOLARYNGOLOGY

## 2024-09-03 PROCEDURE — 99213 OFFICE O/P EST LOW 20 MIN: CPT | Performed by: OTOLARYNGOLOGY

## 2024-09-03 PROCEDURE — 69210 REMOVE IMPACTED EAR WAX UNI: CPT | Performed by: OTOLARYNGOLOGY

## 2024-09-03 PROCEDURE — 3008F BODY MASS INDEX DOCD: CPT | Performed by: OTOLARYNGOLOGY

## 2024-09-03 PROCEDURE — 1157F ADVNC CARE PLAN IN RCRD: CPT | Performed by: OTOLARYNGOLOGY

## 2024-09-03 RX ORDER — FLUTICASONE PROPIONATE 50 MCG
2 SPRAY, SUSPENSION (ML) NASAL DAILY
Qty: 48 ML | Refills: 1 | Status: SHIPPED | OUTPATIENT
Start: 2024-09-03

## 2024-09-03 RX ORDER — AZELASTINE 1 MG/ML
2 SPRAY, METERED NASAL 2 TIMES DAILY
Qty: 90 ML | Refills: 1 | Status: SHIPPED | OUTPATIENT
Start: 2024-09-03

## 2024-09-03 NOTE — PROGRESS NOTES
Subjective   Patient ID: Dyana Irene is a 69 y.o. female  HPI  Patient presents for follow-up for chronic history of left eustachian tube dysfunction and history of chronic serous otitis media.  She is complaining of increased congestion and tinnitus in her left ear after she stopped using the nasal sprays.  The tinnitus has resolved with the Valsalva maneuver and the nasal sprays.  Review of Systems    Objective   Physical Exam  There is cerumen impaction bilaterally and this was cleared using speculum and curettes.  The tympanic membranes are retracted but they are moving with the Valsalva maneuver and insufflation.  There is nasal edema and the turbinates are pale.    Ear cerumen removal    Date/Time: 9/3/2024 2:23 PM    Performed by: Poli Gutierrez MD  Authorized by: Poli Gutierrez MD    Consent:     Consent obtained:  Verbal    Risks discussed:  Pain  Procedure details:     Location:  L ear and R ear    Procedure type: curette        Assessment/Plan   Diagnoses and all orders for this visit:  Dysfunction of both eustachian tubes (Primary)  Non-seasonal allergic rhinitis due to other allergic trigger  -     azelastine (Astelin) 137 mcg (0.1 %) nasal spray; Administer 2 sprays into each nostril 2 times a day.  -     fluticasone (Flonase) 50 mcg/actuation nasal spray; Administer 2 sprays into each nostril once daily. Shake gently. Before first use, prime pump. After use, clean tip and replace cap.  Bilateral impacted cerumen  -     Ear cerumen removal     1.  Chronic left eustachian tube dysfunction and history of left chronic serous otitis media controlled mostly with the Valsalva maneuver.  2.  Chronic allergic rhinitis controlled with Flonase Astelin nasal sprays.  3.  Recurrent bilateral cerumen impaction cleared today.  Her prescriptions were renewed and she was advised to stop smoking again and she will follow-up in 6 months.

## 2024-09-05 ENCOUNTER — OFFICE VISIT (OUTPATIENT)
Dept: PAIN MEDICINE | Facility: CLINIC | Age: 69
End: 2024-09-05
Payer: MEDICARE

## 2024-09-05 VITALS
WEIGHT: 245 LBS | SYSTOLIC BLOOD PRESSURE: 118 MMHG | DIASTOLIC BLOOD PRESSURE: 70 MMHG | RESPIRATION RATE: 16 BRPM | HEART RATE: 79 BPM | OXYGEN SATURATION: 96 % | HEIGHT: 66 IN | BODY MASS INDEX: 39.37 KG/M2

## 2024-09-05 DIAGNOSIS — G89.29 OTHER CHRONIC PAIN: ICD-10-CM

## 2024-09-05 DIAGNOSIS — M47.817 LUMBOSACRAL SPONDYLOSIS WITHOUT MYELOPATHY: ICD-10-CM

## 2024-09-05 DIAGNOSIS — M48.062 SPINAL STENOSIS, LUMBAR REGION, WITH NEUROGENIC CLAUDICATION: Primary | ICD-10-CM

## 2024-09-05 DIAGNOSIS — Z79.899 ENCOUNTER FOR LONG-TERM (CURRENT) USE OF HIGH-RISK MEDICATION: ICD-10-CM

## 2024-09-05 PROCEDURE — 1125F AMNT PAIN NOTED PAIN PRSNT: CPT | Performed by: ANESTHESIOLOGY

## 2024-09-05 PROCEDURE — 3074F SYST BP LT 130 MM HG: CPT | Performed by: ANESTHESIOLOGY

## 2024-09-05 PROCEDURE — 99214 OFFICE O/P EST MOD 30 MIN: CPT | Performed by: ANESTHESIOLOGY

## 2024-09-05 PROCEDURE — 3078F DIAST BP <80 MM HG: CPT | Performed by: ANESTHESIOLOGY

## 2024-09-05 PROCEDURE — 99204 OFFICE O/P NEW MOD 45 MIN: CPT | Performed by: ANESTHESIOLOGY

## 2024-09-05 PROCEDURE — 1159F MED LIST DOCD IN RCRD: CPT | Performed by: ANESTHESIOLOGY

## 2024-09-05 PROCEDURE — 80365 DRUG SCREENING OXYCODONE: CPT | Mod: WESLAB | Performed by: ANESTHESIOLOGY

## 2024-09-05 PROCEDURE — 3008F BODY MASS INDEX DOCD: CPT | Performed by: ANESTHESIOLOGY

## 2024-09-05 PROCEDURE — 1160F RVW MEDS BY RX/DR IN RCRD: CPT | Performed by: ANESTHESIOLOGY

## 2024-09-05 PROCEDURE — 80307 DRUG TEST PRSMV CHEM ANLYZR: CPT | Mod: WESLAB | Performed by: ANESTHESIOLOGY

## 2024-09-05 PROCEDURE — 82570 ASSAY OF URINE CREATININE: CPT | Mod: 59,WESLAB | Performed by: ANESTHESIOLOGY

## 2024-09-05 PROCEDURE — 1157F ADVNC CARE PLAN IN RCRD: CPT | Performed by: ANESTHESIOLOGY

## 2024-09-05 RX ORDER — TRAMADOL HYDROCHLORIDE 50 MG/1
100 TABLET ORAL 2 TIMES DAILY PRN
Qty: 120 TABLET | Refills: 0 | Status: SHIPPED | OUTPATIENT
Start: 2024-09-26 | End: 2024-10-26

## 2024-09-05 ASSESSMENT — ENCOUNTER SYMPTOMS
HEMATOLOGIC/LYMPHATIC NEGATIVE: 1
DEPRESSION: 0
ALLERGIC/IMMUNOLOGIC NEGATIVE: 1
RESPIRATORY NEGATIVE: 1
ENDOCRINE NEGATIVE: 1
OCCASIONAL FEELINGS OF UNSTEADINESS: 1
CONSTITUTIONAL NEGATIVE: 1
LOSS OF SENSATION IN FEET: 1
EYES NEGATIVE: 1
PSYCHIATRIC NEGATIVE: 1
NEUROLOGICAL NEGATIVE: 1
CARDIOVASCULAR NEGATIVE: 1
GASTROINTESTINAL NEGATIVE: 1
BACK PAIN: 1

## 2024-09-05 ASSESSMENT — PAIN - FUNCTIONAL ASSESSMENT: PAIN_FUNCTIONAL_ASSESSMENT: 0-10

## 2024-09-05 ASSESSMENT — LIFESTYLE VARIABLES: TOTAL SCORE: 0

## 2024-09-05 ASSESSMENT — PAIN SCALES - GENERAL
PAINLEVEL: 9
PAINLEVEL_OUTOF10: 9

## 2024-09-05 ASSESSMENT — PATIENT HEALTH QUESTIONNAIRE - PHQ9
1. LITTLE INTEREST OR PLEASURE IN DOING THINGS: NOT AT ALL
2. FEELING DOWN, DEPRESSED OR HOPELESS: NOT AT ALL
SUM OF ALL RESPONSES TO PHQ9 QUESTIONS 1 AND 2: 0

## 2024-09-05 ASSESSMENT — PAIN DESCRIPTION - DESCRIPTORS: DESCRIPTORS: ACHING;NUMBNESS

## 2024-09-05 NOTE — PROGRESS NOTES
Subjective   Patient ID: Dyana Irene is a 69 y.o. female who presents for Back Pain.  Back Pain    Patient here today for a new patient evaluation of her low back and leg pain. Her pain started about 14 years ago and has progressed more over the last few years.  She reports that her biggest issue for her is her inability to stand and walk.  She can only walk a few steps and then she has to bend over to get some relief of sit down.  When she is sitting is is comfortable.  She does sleep in a recliner.  She walks in a flexed position.  She has not had any falls.  She has a weakness and heaviness in her legs when she walks.  She had a surgical consult with Dr. MANUEL Bocanegra and was told to manage her pain with medications.  She rates her pain as a 9/10 at worse.  She has a standing tolerance of about 2-5 min.   She has been taking tramadol 50mg BID.  She has found taking 100 mg BID is more helpful for her.  She is currently with Dr. Garza.  She take tylenol arthritis.  She has been attending PT for her knees and would like to start going to her back and legs.  She would liek to try aqua therapy if possible.  She had epidurals with Dr. Hidalgo and they were not helpful for her.  She was also offered a ablation.      Review of Systems   Constitutional: Negative.    HENT: Negative.     Eyes: Negative.    Respiratory: Negative.     Cardiovascular: Negative.    Gastrointestinal: Negative.    Endocrine: Negative.    Genitourinary: Negative.    Musculoskeletal:  Positive for back pain.   Skin: Negative.    Allergic/Immunologic: Negative.    Neurological: Negative.    Hematological: Negative.    Psychiatric/Behavioral: Negative.         Objective   Physical Exam  Vitals and nursing note reviewed.   Constitutional:       Appearance: Normal appearance.   HENT:      Head: Normocephalic and atraumatic.      Right Ear: Ear canal and external ear normal.      Left Ear: Ear canal and external ear normal.      Nose: Nose normal.       Mouth/Throat:      Mouth: Mucous membranes are moist.      Pharynx: Oropharynx is clear.   Eyes:      Conjunctiva/sclera: Conjunctivae normal.      Pupils: Pupils are equal, round, and reactive to light.   Cardiovascular:      Rate and Rhythm: Normal rate.   Pulmonary:      Effort: Pulmonary effort is normal. No respiratory distress.   Musculoskeletal:      Cervical back: Normal range of motion and neck supple.      Lumbar back: Tenderness present. Decreased range of motion. Negative right straight leg raise test and negative left straight leg raise test. No scoliosis.        Back:    Skin:     General: Skin is warm and dry.   Neurological:      Mental Status: She is alert and oriented to person, place, and time.      Sensory: Sensation is intact.      Motor: Motor function is intact.      Coordination: Coordination abnormal.      Gait: Gait abnormal.      Comments: Patient walks with lumbar flexed position.  Patient can stand from seated position unassisted.   Psychiatric:         Mood and Affect: Mood normal.         Thought Content: Thought content normal.         Assessment/Plan   Problem List Items Addressed This Visit    None  Visit Diagnoses         Codes    Spinal stenosis, lumbar region, with neurogenic claudication    -  Primary M48.062    Relevant Medications    traMADol (Ultram) 50 mg tablet (Start on 9/26/2024)    Other Relevant Orders    Referral to Physical Therapy    Lumbosacral spondylosis without myelopathy     M47.817    Relevant Medications    traMADol (Ultram) 50 mg tablet (Start on 9/26/2024)    Other Relevant Orders    Referral to Physical Therapy    Other chronic pain     G89.29    Relevant Medications    traMADol (Ultram) 50 mg tablet (Start on 9/26/2024)    Other Relevant Orders    Referral to Physical Therapy    Encounter for long-term (current) use of high-risk medication     Z79.899    Relevant Medications    traMADol (Ultram) 50 mg tablet (Start on 9/26/2024)    Other Relevant Orders     Opiate/Opioid/Benzo Prescription Compliance    OOB Internal Tracking (Completed)          I nice discussion with the patient today our plan will be as follows.  FINDINGS:  The examination is somewhat degraded by patient motion.      Alignment: Limited coronal  images demonstrate a levoconvex  curvature of the upper lumbar spine and a dextroconvex curvature of  the lower lumbar spine. There is again grade 1 anterolisthesis of L3  on L4 with loss of the normal lumbar lordosis.      Vertebrae/Intervertebral Discs: The vertebral bodies demonstrate  expected height.  The marrow signal remains diffusely inhomogeneous  throughout on T1 and T2 weighted imaging. Fatty marrow degenerative  endplate signal changes are noted at L5-S1. There are subtle Modic  type 1 endplate changes at L4-5. There are scattered Schmorl's nodes  along the endplates. There are scattered ovoid foci of increased  signal on T1 and T2 weighted imaging likely representing hemangiomas  or focal fatty rests. There is multilevel disc desiccation, endplate  spurring, and loss of disc height.      Conus: The lower thoracic cord appears unremarkable. The conus  terminates at L2.      T11-12: Degenerative facet arthropathy, ligamentum flavum  hypertrophy, and subtle disc bulging contribute to mild narrowing of  the spinal canal. T12-L1:  There is no significant central canal or  neural foraminal stenosis. L1-2: There is bilateral degenerative  facet arthropathy, diffuse disc bulging, and superimposed right  subarticular/intraforaminal disc protrusion. There is narrowing of  the right lateral recess with mild central canal stenosis and mild  right-sided neuroforaminal narrowing. The findings are relatively  similar from the previous exam. L2-3: There is bilateral degenerative  facet arthropathy, ligamentum flavum hypertrophy, and mildly  prominent epidural fat as well as circumferential disc bulging  contributing to moderate narrowing of the thecal sac  with crowding of  nerve roots of the cauda equina. The neuroforamina are patent. L3-4:  Facet and ligamentum flavum hypertrophy, prominent epidural fat, and  circumferential disc bulging contribute to effacement of the lateral  recesses. There is marked narrowing of the thecal sac with crowding  of nerve roots of the cauda equina. There is mild neuroforaminal  narrowing, right greater than left due to asymmetric bulging disc.  The findings are relatively similar from the previous exam. L4-5:  There is left greater than right facet and ligamentum flavum  hypertrophy as well as circumferential disc bulging and endplate  spurring contributing to effacement of the left lateral recess and at  least mild central canal stenosis. There is moderate left and no  significant right-sided neuroforaminal narrowing due to asymmetric  endplate spurring and foraminal/far left lateral disc protrusion  contacting the exiting left L4 nerve root. The findings are  relatively similar from the previous exam. L5-S1: There is left  greater than right degenerative facet arthropathy as well as  circumferential disc bulging and endplate spurring. There is mild  narrowing of the left lateral recess and no significant central canal  stenosis. There is mild neuroforaminal narrowing, left greater than  right.      There are degenerative changes of the sacroiliac joints. There is  atrophy of the paraspinous musculature.      There is a partially imaged left renal lesion favored to represent a  large cyst. Another is noted in the posterior interpolar region of  the right kidney.      IMPRESSION:      Somewhat limited, motion degraded MRI of the lumbar spine again  demonstrating multilevel degenerative changes with associated central  canal and neuroforaminal stenosis. Mass effect on the thecal sac and  nerve roots of the cauda equina remains most severe at L3-4. When  allowing for differences in slice positioning, partial volume  averaging, and  motion degradation the findings are very similar from  the prior study.      Scoliosis. There is also again grade 1 anterolisthesis of L3 on L4.      Radiology:   Physically: Patient has attempted physical therapy in the past.  She does some lumbar stretching exercises however physical therapy has not been beneficial for her.  She has made lifestyle modifications in order to avoid exacerbations of her pain.    Psychologically: Behavioral health issues associated with the patient's chronic pain.    Medication: I am happy to take over the patient's medication regimen for her.  We will move towards tramadol 100 mg twice a day as needed.  I will refill the patient's opioids today for 1 month.  The patient continues to see benefit and improvement in their quality of life and ability to maintain ADLs.  Patient educated about the risks of taking opioids and operating a motor vehicle.  Patient reports no adverse side effects to current medication regimen.  Current regimen does allow patient to maintain ADLs.  Patient reports no new neurologic symptoms, new pain areas, or exacerbation in pain today.  Patient reports they are happy with current treatment care path.    [place] was reviewed and was consistent with the history.    Patient has been educated on the risks, benefits, and alternatives of controlled substances as well as the proper way to store these medications.  The patient and I discussed the nature of this medication and its side effects.  We discussed tolerance, physical dependence, psychological dependence, addiction and opioid-induced hyperalgesia.  We discussed the potential need to wean from this medication.  We discussed the availability of programs that can help with this process if necessary.  We discussed safety issues related to opioids including safe storage.  We discussed the fact that the patient should not drive an automobile or operate heavy machinery while taking this medication.  A prescription for  naloxone was offered to the patient.  The patient will be re-evaluated for the need to continue opioid therapy in 60-90 days.  A urine or saliva specimen was obtained for toxicology screening  A opioid agreement and care plan was presented to the patient today.  The patient had the opportunity to read through the agreement during the office visit and has signed the agreement today.  A copy of the agreement was given to the patient to take home for their records.        Duration: Greater than 5 years.    Intervention: I will fax off for all the notes from Dr. Hidalgo's office to see what interventions has been completed.  I will have her follow-up and we will determine what interventional options there are for her.         Osman Lux MD 09/05/24 3:37 PM

## 2024-09-06 LAB
AMPHETAMINES UR QL SCN: NORMAL
BARBITURATES UR QL SCN: NORMAL
BZE UR QL SCN: NORMAL
CANNABINOIDS UR QL SCN: NORMAL
CREAT UR-MCNC: 147.1 MG/DL (ref 20–320)
PCP UR QL SCN: NORMAL

## 2024-09-10 ENCOUNTER — APPOINTMENT (OUTPATIENT)
Dept: CARDIOLOGY | Facility: CLINIC | Age: 69
End: 2024-09-10
Payer: MEDICARE

## 2024-09-10 LAB
1OH-MIDAZOLAM UR CFM-MCNC: <25 NG/ML
6MAM UR CFM-MCNC: <25 NG/ML
7AMINOCLONAZEPAM UR CFM-MCNC: <25 NG/ML
A-OH ALPRAZ UR CFM-MCNC: <25 NG/ML
ALPRAZ UR CFM-MCNC: <25 NG/ML
CHLORDIAZEP UR CFM-MCNC: <25 NG/ML
CLONAZEPAM UR CFM-MCNC: <25 NG/ML
CODEINE UR CFM-MCNC: <50 NG/ML
DIAZEPAM UR CFM-MCNC: <25 NG/ML
EDDP UR CFM-MCNC: <25 NG/ML
FENTANYL UR CFM-MCNC: <2.5 NG/ML
HYDROCODONE CTO UR CFM-MCNC: <25 NG/ML
HYDROMORPHONE UR CFM-MCNC: <25 NG/ML
LORAZEPAM UR CFM-MCNC: <25 NG/ML
METHADONE UR CFM-MCNC: <25 NG/ML
MIDAZOLAM UR CFM-MCNC: <25 NG/ML
MORPHINE UR CFM-MCNC: <50 NG/ML
NORDIAZEPAM UR CFM-MCNC: <25 NG/ML
NORFENTANYL UR CFM-MCNC: <2.5 NG/ML
NORHYDROCODONE UR CFM-MCNC: <25 NG/ML
NOROXYCODONE UR CFM-MCNC: <25 NG/ML
NORTRAMADOL UR-MCNC: >1000 NG/ML
OXAZEPAM UR CFM-MCNC: <25 NG/ML
OXYCODONE UR CFM-MCNC: <25 NG/ML
OXYMORPHONE UR CFM-MCNC: <25 NG/ML
TEMAZEPAM UR CFM-MCNC: <25 NG/ML
TRAMADOL UR CFM-MCNC: >1000 NG/ML
ZOLPIDEM UR CFM-MCNC: <25 NG/ML
ZOLPIDEM UR-MCNC: <25 NG/ML

## 2024-09-23 ENCOUNTER — HOSPITAL ENCOUNTER (OUTPATIENT)
Dept: RADIOLOGY | Facility: CLINIC | Age: 69
End: 2024-09-23
Payer: MEDICARE

## 2024-10-03 ENCOUNTER — APPOINTMENT (OUTPATIENT)
Dept: PHYSICAL THERAPY | Facility: CLINIC | Age: 69
End: 2024-10-03
Payer: MEDICARE

## 2024-10-08 ENCOUNTER — OFFICE VISIT (OUTPATIENT)
Dept: PAIN MEDICINE | Facility: CLINIC | Age: 69
End: 2024-10-08
Payer: MEDICARE

## 2024-10-08 VITALS
OXYGEN SATURATION: 98 % | HEIGHT: 66 IN | HEART RATE: 86 BPM | BODY MASS INDEX: 39.37 KG/M2 | DIASTOLIC BLOOD PRESSURE: 78 MMHG | SYSTOLIC BLOOD PRESSURE: 122 MMHG | RESPIRATION RATE: 16 BRPM | WEIGHT: 245 LBS

## 2024-10-08 DIAGNOSIS — M48.062 SPINAL STENOSIS, LUMBAR REGION, WITH NEUROGENIC CLAUDICATION: ICD-10-CM

## 2024-10-08 DIAGNOSIS — Z79.899 ENCOUNTER FOR LONG-TERM (CURRENT) USE OF HIGH-RISK MEDICATION: ICD-10-CM

## 2024-10-08 DIAGNOSIS — G89.29 OTHER CHRONIC PAIN: ICD-10-CM

## 2024-10-08 DIAGNOSIS — M48.062 NEUROGENIC CLAUDICATION DUE TO LUMBAR SPINAL STENOSIS: Primary | ICD-10-CM

## 2024-10-08 DIAGNOSIS — M47.817 LUMBOSACRAL SPONDYLOSIS WITHOUT MYELOPATHY: ICD-10-CM

## 2024-10-08 PROCEDURE — 3008F BODY MASS INDEX DOCD: CPT | Performed by: ANESTHESIOLOGY

## 2024-10-08 PROCEDURE — 1125F AMNT PAIN NOTED PAIN PRSNT: CPT | Performed by: ANESTHESIOLOGY

## 2024-10-08 PROCEDURE — 99214 OFFICE O/P EST MOD 30 MIN: CPT | Performed by: ANESTHESIOLOGY

## 2024-10-08 PROCEDURE — 1159F MED LIST DOCD IN RCRD: CPT | Performed by: ANESTHESIOLOGY

## 2024-10-08 PROCEDURE — 3078F DIAST BP <80 MM HG: CPT | Performed by: ANESTHESIOLOGY

## 2024-10-08 PROCEDURE — 1160F RVW MEDS BY RX/DR IN RCRD: CPT | Performed by: ANESTHESIOLOGY

## 2024-10-08 PROCEDURE — 3074F SYST BP LT 130 MM HG: CPT | Performed by: ANESTHESIOLOGY

## 2024-10-08 PROCEDURE — 1157F ADVNC CARE PLAN IN RCRD: CPT | Performed by: ANESTHESIOLOGY

## 2024-10-08 RX ORDER — TRAMADOL HYDROCHLORIDE 50 MG/1
100 TABLET ORAL 2 TIMES DAILY PRN
Qty: 120 TABLET | Refills: 0 | Status: SHIPPED | OUTPATIENT
Start: 2024-10-24 | End: 2024-11-23

## 2024-10-08 ASSESSMENT — PATIENT HEALTH QUESTIONNAIRE - PHQ9
SUM OF ALL RESPONSES TO PHQ9 QUESTIONS 1 AND 2: 0
1. LITTLE INTEREST OR PLEASURE IN DOING THINGS: NOT AT ALL
2. FEELING DOWN, DEPRESSED OR HOPELESS: NOT AT ALL

## 2024-10-08 ASSESSMENT — ENCOUNTER SYMPTOMS
EYES NEGATIVE: 1
GASTROINTESTINAL NEGATIVE: 1
HEMATOLOGIC/LYMPHATIC NEGATIVE: 1
BACK PAIN: 1
ALLERGIC/IMMUNOLOGIC NEGATIVE: 1
PSYCHIATRIC NEGATIVE: 1
CONSTITUTIONAL NEGATIVE: 1
CARDIOVASCULAR NEGATIVE: 1
RESPIRATORY NEGATIVE: 1
ENDOCRINE NEGATIVE: 1
NEUROLOGICAL NEGATIVE: 1

## 2024-10-08 ASSESSMENT — PAIN SCALES - GENERAL
PAINLEVEL_OUTOF10: 8
PAINLEVEL: 8

## 2024-10-08 ASSESSMENT — PAIN DESCRIPTION - DESCRIPTORS: DESCRIPTORS: ACHING

## 2024-10-08 ASSESSMENT — PAIN - FUNCTIONAL ASSESSMENT: PAIN_FUNCTIONAL_ASSESSMENT: 0-10

## 2024-10-08 NOTE — PROGRESS NOTES
Subjective   Patient ID: Dyana Irene is a 69 y.o. female who presents for Back Pain.  Back Pain    Patient here today for management of her spinal stenosis.  She had previously been seeing Dr. Hidalgo who did L3-4 intralaminar epidural steroid injections for her with only minimal relief of a few days.  She underwent surgical consultation with with Dr. Lawrence Landeros as well as with Dr. Wadsworth who both stated she is not a candidate for surgery at this time as it would be too extensive.  She did look over the information about the mild procedure that was given to her at the last office visit and she stated she is a candidate she would like to move forward with it.  She does suffer with significant back as well as radiating leg pain, weakness and numbness with an onset of approximately 3 to 5 minutes.  She has a standing tolerance of no more than 10 minutes at this time.  This does make ADLs very difficult for her.  Sitting down does improve her pain.  She also needs refills of her tramadol.  She continues to be stable at this time on the current dose.  Without adverse side effect.  She is excited about moving forward with the mild procedure.    Review of Systems   Constitutional: Negative.    HENT: Negative.     Eyes: Negative.    Respiratory: Negative.     Cardiovascular: Negative.    Gastrointestinal: Negative.    Endocrine: Negative.    Genitourinary: Negative.    Musculoskeletal:  Positive for back pain.   Skin: Negative.    Allergic/Immunologic: Negative.    Neurological: Negative.    Hematological: Negative.    Psychiatric/Behavioral: Negative.         Objective   Physical Exam  Vitals and nursing note reviewed.   Constitutional:       Appearance: Normal appearance.   HENT:      Head: Normocephalic and atraumatic.      Right Ear: Ear canal and external ear normal.      Left Ear: Ear canal and external ear normal.      Nose: Nose normal.      Mouth/Throat:      Mouth: Mucous membranes are moist.      Pharynx:  Oropharynx is clear.   Eyes:      Conjunctiva/sclera: Conjunctivae normal.      Pupils: Pupils are equal, round, and reactive to light.   Cardiovascular:      Rate and Rhythm: Normal rate.   Pulmonary:      Effort: Pulmonary effort is normal. No respiratory distress.   Musculoskeletal:      Cervical back: Normal range of motion and neck supple.      Lumbar back: Tenderness present. Decreased range of motion. Negative right straight leg raise test and negative left straight leg raise test. No scoliosis.        Back:    Skin:     General: Skin is warm and dry.   Neurological:      Mental Status: She is alert and oriented to person, place, and time.      Sensory: Sensation is intact.      Motor: Motor function is intact.      Coordination: Coordination abnormal.      Gait: Gait abnormal.      Comments: Patient walks with lumbar flexed position.  Patient can stand from seated position unassisted.   Psychiatric:         Mood and Affect: Mood normal.         Thought Content: Thought content normal.         Assessment/Plan   Problem List Items Addressed This Visit    None  Visit Diagnoses         Codes    Neurogenic claudication due to lumbar spinal stenosis    -  Primary M48.062    Lumbosacral spondylosis without myelopathy     M47.817    Relevant Medications    traMADol (Ultram) 50 mg tablet (Start on 10/24/2024)    Spinal stenosis, lumbar region, with neurogenic claudication     M48.062    Relevant Medications    traMADol (Ultram) 50 mg tablet (Start on 10/24/2024)    Other chronic pain     G89.29    Relevant Medications    traMADol (Ultram) 50 mg tablet (Start on 10/24/2024)    Encounter for long-term (current) use of high-risk medication     Z79.899    Relevant Medications    traMADol (Ultram) 50 mg tablet (Start on 10/24/2024)          I nice discussion with the patient today our plan will be as follows.    Radiology: Alignment: Limited coronal  images demonstrate a levoconvex  curvature of the upper lumbar  spine and a dextroconvex curvature of  the lower lumbar spine. There is again grade 1 anterolisthesis of L3  on L4 with loss of the normal lumbar lordosis.      Vertebrae/Intervertebral Discs: The vertebral bodies demonstrate  expected height.  The marrow signal remains diffusely inhomogeneous  throughout on T1 and T2 weighted imaging. Fatty marrow degenerative  endplate signal changes are noted at L5-S1. There are subtle Modic  type 1 endplate changes at L4-5. There are scattered Schmorl's nodes  along the endplates. There are scattered ovoid foci of increased  signal on T1 and T2 weighted imaging likely representing hemangiomas  or focal fatty rests. There is multilevel disc desiccation, endplate  spurring, and loss of disc height.      Conus: The lower thoracic cord appears unremarkable. The conus  terminates at L2.      T11-12: Degenerative facet arthropathy, ligamentum flavum  hypertrophy, and subtle disc bulging contribute to mild narrowing of  the spinal canal. T12-L1:  There is no significant central canal or  neural foraminal stenosis. L1-2: There is bilateral degenerative  facet arthropathy, diffuse disc bulging, and superimposed right  subarticular/intraforaminal disc protrusion. There is narrowing of  the right lateral recess with mild central canal stenosis and mild  right-sided neuroforaminal narrowing. The findings are relatively  similar from the previous exam. L2-3: There is bilateral degenerative  facet arthropathy, ligamentum flavum hypertrophy, and mildly  prominent epidural fat as well as circumferential disc bulging  contributing to moderate narrowing of the thecal sac with crowding of  nerve roots of the cauda equina. The neuroforamina are patent. L3-4:  Facet and ligamentum flavum hypertrophy, prominent epidural fat, and  circumferential disc bulging contribute to effacement of the lateral  recesses. There is marked narrowing of the thecal sac with crowding  of nerve roots of the cauda  equina. There is mild neuroforaminal  narrowing, right greater than left due to asymmetric bulging disc.  The findings are relatively similar from the previous exam. L4-5:  There is left greater than right facet and ligamentum flavum  hypertrophy as well as circumferential disc bulging and endplate  spurring contributing to effacement of the left lateral recess and at  least mild central canal stenosis. There is moderate left and no  significant right-sided neuroforaminal narrowing due to asymmetric  endplate spurring and foraminal/far left lateral disc protrusion  contacting the exiting left L4 nerve root. The findings are  relatively similar from the previous exam. L5-S1: There is left  greater than right degenerative facet arthropathy as well as  circumferential disc bulging and endplate spurring. There is mild  narrowing of the left lateral recess and no significant central canal  stenosis. There is mild neuroforaminal narrowing, left greater than  right.      There are degenerative changes of the sacroiliac joints. There is  atrophy of the paraspinous musculature.      There is a partially imaged left renal lesion favored to represent a  large cyst. Another is noted in the posterior interpolar region of  the right kidney.      IMPRESSION:      Somewhat limited, motion degraded MRI of the lumbar spine again  demonstrating multilevel degenerative changes with associated central  canal and neuroforaminal stenosis. Mass effect on the thecal sac and  nerve roots of the cauda equina remains most severe at L3-4. When  allowing for differences in slice positioning, partial volume  averaging, and motion degradation the findings are very similar from  the prior study.      Scoliosis. There is also again grade 1 anterolisthesis of L3 on L4.    Physically: Patient should continue with daily home exercise program and phase 2 exercises learned in physical therapy.    Psychologically: No behavioral health issues associated  with the patient's pain.    Medication: I will refill the patient's opioids today for 1 month.  The patient continues to see benefit and improvement in their quality of life and ability to maintain ADLs.  Patient educated about the risks of taking opioids and operating a motor vehicle.  Patient reports no adverse side effects to current medication regimen.  Current regimen does allow patient to maintain ADLs.  Patient reports no new neurologic symptoms, new pain areas, or exacerbation in pain today.  Patient reports they are happy with current treatment care path.    [place] was reviewed and was consistent with the history.    Patient has been educated on the risks, benefits, and alternatives of controlled substances as well as the proper way to store these medications.  The patient and I discussed the nature of this medication and its side effects.  We discussed tolerance, physical dependence, psychological dependence, addiction and opioid-induced hyperalgesia.  We discussed the potential need to wean from this medication.  We discussed the availability of programs that can help with this process if necessary.  We discussed safety issues related to opioids including safe storage.  We discussed the fact that the patient should not drive an automobile or operate heavy machinery while taking this medication.  A prescription for naloxone was offered to the patient.  The patient will be re-evaluated for the need to continue opioid therapy in 60-90 days.  All previous urine drug screens and saliva drug screens were reviewed today and are compliant with the patient's prescriptive history.      Duration: Greater than 1 year    Intervention: Patient has failed conservative physical therapy as well as basic interventions including intralaminar epidural steroid injections.  The patient does suffer with moderate central canal stenosis at L2-3 as well as moderate to severe central canal stenosis at L3-4 secondary to ligamentum  flavum hypertrophy.  The patient is an excellent candidate for minimally invasive lumbar decompression, mild procedure, to address the stenosis at these levels and she is not a surgical candidate that has been seen by 2 separate surgeons.  Risks, benefit, and alternatives of the procedure were discussed with the patient.  Oswestry score has been compelted and recorded.         Osman Lux MD 10/08/24 8:43 AM

## 2024-10-08 NOTE — PROGRESS NOTES
Patient instructed to bring pill bottle to refill visits    UDS LAST COMPLETED: 9/2024  CONTROLLED SUBSTANCES AGREEMENT LAST SIGNED: 9/2024  ORT LAST COMPLETED:9/2024  Modified Oswestry disability form filled out annually.

## 2024-10-11 ENCOUNTER — OFFICE VISIT (OUTPATIENT)
Dept: CARDIOLOGY | Facility: CLINIC | Age: 69
End: 2024-10-11
Payer: MEDICARE

## 2024-10-11 VITALS
WEIGHT: 247 LBS | HEIGHT: 66 IN | DIASTOLIC BLOOD PRESSURE: 70 MMHG | HEART RATE: 70 BPM | BODY MASS INDEX: 39.7 KG/M2 | SYSTOLIC BLOOD PRESSURE: 114 MMHG

## 2024-10-11 DIAGNOSIS — F17.210 TOBACCO DEPENDENCE DUE TO CIGARETTES: ICD-10-CM

## 2024-10-11 DIAGNOSIS — I10 BENIGN ESSENTIAL HYPERTENSION: ICD-10-CM

## 2024-10-11 DIAGNOSIS — E78.2 MIXED HYPERLIPIDEMIA: ICD-10-CM

## 2024-10-11 DIAGNOSIS — I34.0 MITRAL VALVE INSUFFICIENCY, UNSPECIFIED ETIOLOGY: Primary | ICD-10-CM

## 2024-10-11 DIAGNOSIS — R93.1 ELEVATED CORONARY ARTERY CALCIUM SCORE: ICD-10-CM

## 2024-10-11 PROCEDURE — 1157F ADVNC CARE PLAN IN RCRD: CPT | Performed by: INTERNAL MEDICINE

## 2024-10-11 PROCEDURE — 3008F BODY MASS INDEX DOCD: CPT | Performed by: INTERNAL MEDICINE

## 2024-10-11 PROCEDURE — 99213 OFFICE O/P EST LOW 20 MIN: CPT | Performed by: INTERNAL MEDICINE

## 2024-10-11 PROCEDURE — 1159F MED LIST DOCD IN RCRD: CPT | Performed by: INTERNAL MEDICINE

## 2024-10-11 PROCEDURE — 3074F SYST BP LT 130 MM HG: CPT | Performed by: INTERNAL MEDICINE

## 2024-10-11 PROCEDURE — 3078F DIAST BP <80 MM HG: CPT | Performed by: INTERNAL MEDICINE

## 2024-10-11 ASSESSMENT — LIFESTYLE VARIABLES
SKIP TO QUESTIONS 9-10: 1
HOW MANY STANDARD DRINKS CONTAINING ALCOHOL DO YOU HAVE ON A TYPICAL DAY: 1 OR 2
HAVE YOU OR SOMEONE ELSE BEEN INJURED AS A RESULT OF YOUR DRINKING: NO
AUDIT-C TOTAL SCORE: 1
HAS A RELATIVE, FRIEND, DOCTOR, OR ANOTHER HEALTH PROFESSIONAL EXPRESSED CONCERN ABOUT YOUR DRINKING OR SUGGESTED YOU CUT DOWN: NO
AUDIT TOTAL SCORE: 1
HOW OFTEN DO YOU HAVE SIX OR MORE DRINKS ON ONE OCCASION: NEVER
HOW OFTEN DO YOU HAVE A DRINK CONTAINING ALCOHOL: MONTHLY OR LESS

## 2024-10-11 ASSESSMENT — ENCOUNTER SYMPTOMS
DYSPNEA ON EXERTION: 0
BLURRED VISION: 0
LOSS OF SENSATION IN FEET: 0
BACK PAIN: 1
ABDOMINAL PAIN: 0
OCCASIONAL FEELINGS OF UNSTEADINESS: 0
NUMBNESS: 0
DEPRESSION: 0
SHORTNESS OF BREATH: 1
HEMATURIA: 0
PALPITATIONS: 0
DYSURIA: 0
PARESTHESIAS: 0
COUGH: 0

## 2024-10-11 NOTE — PROGRESS NOTES
Subjective   Dyana Irene is a 69 y.o. female.    Chief Complaint:  Follow-up (Mrs\Ms. Irene is present for her 6 month Follow up with Dr. Ojeda, pt will like to discuss her results from the last testing ordered by provider )    HPI  Cardiac wise patient stable.  Describes no chest pain or anginal type symptoms.  She is still trying to stop smoking but has been unsuccessful with a lot of stress in her life.   chronic shortness of breath from her cigarette smoking but none unusual.    Review of Systems   Constitutional: Negative for malaise/fatigue.   HENT:  Negative for congestion.    Eyes:  Negative for blurred vision.   Cardiovascular:  Negative for chest pain, dyspnea on exertion and palpitations.   Respiratory:  Positive for shortness of breath. Negative for cough.    Musculoskeletal:  Positive for back pain. Negative for joint pain.   Gastrointestinal:  Negative for abdominal pain.   Genitourinary:  Negative for dysuria and hematuria.   Neurological:  Negative for numbness and paresthesias.       Objective   Constitutional:       Appearance: Not in distress.   Eyes:      Conjunctiva/sclera: Conjunctivae normal.   Neck:      Vascular: JVD normal.   Pulmonary:      Breath sounds: Normal breath sounds. No wheezing. No rhonchi. No rales.   Cardiovascular:      Normal rate. Regular rhythm.      Murmurs: There is no murmur.      No gallop.  No click. No rub.   Abdominal:      Palpations: Abdomen is soft.   Neurological:      General: No focal deficit present.      Mental Status: Alert.         Lab Review:   Office Visit on 09/05/2024   Component Date Value    Creatinine, Urine Random 09/05/2024 147.1     Amphetamine Screen, Urine 09/05/2024 Presumptive Negative     Barbiturate Screen, Urine 09/05/2024 Presumptive Negative     Cannabinoid Screen, Urine 09/05/2024 Presumptive Negative     Cocaine Metabolite Scree* 09/05/2024 Presumptive Negative     PCP Screen, Urine 09/05/2024 Presumptive Negative     Clonazepam  09/05/2024 <25     7-Aminoclonazepam 09/05/2024 <25     Alprazolam 09/05/2024 <25     Alpha-Hydroxyalprazolam 09/05/2024 <25     Midazolam 09/05/2024 <25     Alpha-Hydroxymidazolam 09/05/2024 <25     Chlordiazepoxide 09/05/2024 <25     Diazepam 09/05/2024 <25     Nordiazepam 09/05/2024 <25     Temazepam 09/05/2024 <25     Oxazepam 09/05/2024 <25     Lorazepam 09/05/2024 <25     Methadone 09/05/2024 <25     EDDP 09/05/2024 <25     6-Acetylmorphine 09/05/2024 <25     Codeine 09/05/2024 <50     Hydrocodone 09/05/2024 <25     Hydromorphone 09/05/2024 <25     Morphine  09/05/2024 <50     Norhydrocodone 09/05/2024 <25     Noroxycodone 09/05/2024 <25     Oxycodone 09/05/2024 <25     Oxymorphone 09/05/2024 <25     Fentanyl 09/05/2024 <2.5     Norfentanyl 09/05/2024 <2.5     Tramadol 09/05/2024 >1,000 (H)     O-Desmethyltramadol 09/05/2024 >1,000 (H)     Zolpidem 09/05/2024 <25     Zolpidem Metabolite (ZCA) 09/05/2024 <25        Assessment/Plan   The primary encounter diagnosis was Mitral valve insufficiency, unspecified etiology. Diagnoses of Mixed hyperlipidemia, Elevated coronary artery calcium score, and Benign essential hypertension were also pertinent to this visit.    Mixed hyperlipidemia  Dr. Jeffery has ordered lipid panel, review on return.    Benign essential hypertension  Blood pressure currently well-controlled on current regimen, no change necessary.    Elevated coronary artery calcium score  Will continue standard risk factor modification and follow on a clinical basis.    Mitral valve regurgitation  No significant cardiac symptoms.  Only a mild degree of mitral valve regurgitation on the echocardiogram in April.  Will plan on repeating echocardiogram approximately April 2026.    Tobacco dependence due to cigarettes  Smoking cessation urged.  She states she is down to a half a pack of cigarettes per day and will try to do better.

## 2024-10-11 NOTE — ASSESSMENT & PLAN NOTE
No significant cardiac symptoms.  Only a mild degree of mitral valve regurgitation on the echocardiogram in April.  Will plan on repeating echocardiogram approximately April 2026.

## 2024-10-11 NOTE — ASSESSMENT & PLAN NOTE
Smoking cessation urged.  She states she is down to a half a pack of cigarettes per day and will try to do better.

## 2024-10-15 ENCOUNTER — HOSPITAL ENCOUNTER (OUTPATIENT)
Dept: RADIOLOGY | Facility: CLINIC | Age: 69
End: 2024-10-15
Payer: MEDICARE

## 2024-10-17 NOTE — PROGRESS NOTES
Physical Therapy Evaluation    Patient Name: Dyana Irene  MRN: [id number]  Evaluation Date: 10/18/2024  Time Calculation  Start Time: 1035  Stop Time: 1113  Time Calculation (min): 38 min  PT Evaluation Time Entry  PT Evaluation (Low) Time Entry: 28     PT Therapeutic Procedures Time Entry  Therapeutic Exercise Time Entry: 10    Problem List Items Addressed This Visit             ICD-10-CM    Spinal stenosis, lumbar region, with neurogenic claudication - Primary M48.062    Relevant Orders    Follow Up In Physical Therapy    Lumbosacral spondylosis without myelopathy M47.817    Relevant Orders    Follow Up In Physical Therapy    Other chronic pain G89.29    Relevant Orders    Follow Up In Physical Therapy       Subjective   Patient reported hx of condition: Pt reports ~15 year hx of chronic LBP that can radiate down BLEs, began with insidious onset. She notes radiating pain into BLEs as well as weakness. Pain is constant but varies in intensity. Pain worsens with any distance walking, standing more than ~5 minutes, and with any other prolonged time on her feet. Bending also causes pain. She has MILD procedure set with Dr. Lux on 10/30. PT ordered with request of aquatics.     Precautions:  Precautions  Precautions Comment: None    Relevant PMH:  Hx B TKA, fibromyalgia, stenosis, arthritis, HTN, emphysema    Red Flags: Do you have any of the following? No  Fever/chills, unexplained weight changes, dizziness/fainting, unexplained change in bowel or bladder functions, unexplained malaise or muscle weakness, night pain/sweats, numbness or tingling    Pain:  Pain Assessment: 0-10  0-10 (Numeric) Pain Score: 8 (Pain at max 10/10)  Pain Type: Chronic pain  Pain Location: Back  Pain Orientation: Right, Left  Pain Radiating Towards: radiates down BLEs  Pain Descriptors: Aching, Numbness, Shooting  Pain Frequency: Constant/continuous  Effect of Pain on Daily Activities: Pain present with ADLs, limits standing to 5  minutes at a time, walking to very short distances, difficulty with bending and lifitng    Home Living:  Home type: House  Stairs: Yes 2 stairs to enter from garage  Lives with: Alone  Occupation: retired  Hobbies/activities: walking and exercising - has been limited by pain    Prior Function Per Pt/Caregiver Report:  Prior Function Comments: Chronic hx of pain with worsening functional tolerance over last few years - independent with mobility but limits to avoid pain    Patient's Goal for Therapy:  Reduce pain, improve activity tolerance    OBJECTIVE:  Objective   Posture:  Posture Comment: Mild trunk flexion, forward head and rounded shoulders  Range of Motion:  Lumbar ROM Range   Flexion 50% limited, mild pain with return to extension   Extension 50% limited, painful   R rotation 50% limited   L rotation 50% limited   R sidebend 50% limited   L sidebend 50% limited     Strength:  LE MMT L R   Hip flexion 4/5 4/5   Hip extension 3+/5 3+/5   Hip abduction 3+/5 3+/5   Hip adduction 3+/5 3+/5   Knee flexion 4/5 4/5   Knee extension 4/5 4/5     Flexibility:  Flexibility Comment: Tight B piriformis, hamstrings, gastrocs  Palpation:  Palpation Comment: TTP B lower lumbar paraspinals  Special Tests:  Special Tests Comment: Negative repeated movements, slump, SLR  Gait:  Gait Comment: Antalgic with increaesd time in double stance, flexed posture, uses walls for stability when walking  Stairs:  Stairs Comment: Avoids when able; performs nonreciprocally with pain  Bed Mobility:  Bed Mobility Comment: Independent with pain  Transfers:  Transfers Comment: Independent with pain    Outcome Measures:  Other Measures  5x Sit to Stand: 14.5 (No UE support, some increase in pain)  Oswestry Disablity Index (ELSIE): 31     Assessment  PT Assessment Results: Decreased strength, Decreased range of motion, Decreased mobility, Pain  Rehab Prognosis: Good    Pt is a 69 y.o. female who presents with impairments listed above. These  impairments have led to functional limitations including pain during ADLs and limited tolerance of standing, walking, bending, lifting, and other functional tasks. Pt would benefit from skilled physical therapy intervention to improve above impairments and facilitate return to function.    Complexity of Evaluation: Low    Based on the history including personal factors and/or comorbidities, examination of body systems including body structures and function, activity limitations, and/or participation restrictions, as well as clinical presentation, patient meets criteria for above complexity evaluation.    Plan  Treatment/Interventions: Aquatic therapy, Dry needling, Electrical stimulation, Manual therapy, Neuromuscular re-education, Taping techniques, Therapeutic activities, Therapeutic exercises, Ultrasound  PT Plan: Skilled PT  PT Frequency: 2 times per week  Duration: 10 visits  Certification Period Start Date: 10/18/24  Certification Period End Date: 01/16/25  Number of Treatments Authorized: MN  Rehab Potential: Good  Plan of Care Agreement: Patient    Insurance Plan: Payor: MEDICARE / Plan: MEDICARE PART A AND B / Product Type: *No Product type* /     Plan for next visit: Pt to have MILD procedure prior to next session - pending clearance by MD, plan to begin with aquatics to address pain, mobility, and function. May begin on land if recommended by surgeon    OP EDUCATION:  Outpatient Education  Individual(s) Educated: Patient  Education Provided: Anatomy, Body Mechanics, Home Exercise Program, POC  Diagnosis and Precautions: LBP with B radiculopathy  Risk and Benefits Discussed with Patient/Caregiver/Other: yes  Patient/Caregiver Demonstrated Understanding: yes  Plan of Care Discussed and Agreed Upon: yes  Patient Response to Education: Patient/Caregiver Verbalized Understanding of Information, Patient/Caregiver Asked Appropriate Questions, Patient/Caregiver Performed Return Demonstration of  Exercises/Activities    Today's Treatment:  Therapeutic Exercise  HEP issued, demonstrated, instructed pt in exercises, exercises include:  Access Code: [email or web address]  URL: [email or web address]  Date: 10/18/2024  Prepared by: Raymond Huddleston    Exercises  - Seated Hamstring Stretch  - 1 x daily - 7 x weekly - 1 sets - 3 reps - 30 second hold  - Seated Transversus Abdominis Bracing  - 1 x daily - 7 x weekly - 2 sets - 10 reps - 3 second hold  - Seated Hip Adduction Squeeze with Ball  - 1 x daily - 7 x weekly - 2 sets - 10 reps  - Seated Hip Abduction with Resistance  - 1 x daily - 7 x weekly - 2 sets - 10 reps  - Seated March  - 1 x daily - 7 x weekly - 2 sets - 10 reps    Goals:  Active       PT Problem       STG, 5 visits       Start:  10/18/24    Expected End:  12/02/24       Pt will be independent in HEP to improve LE and core strength, mobility, and function.  Pt will report 50% reduction in pain with functional mobility such as standing, walking, and stairs.         Pt will increase strength in core and LEs by 1/2 MMT in all planes for improved performance of functional mobility.         Start:  10/18/24    Expected End:  01/16/25            Pt will demonstrate no more than 25% limitation in lumbar AROM without pain in all planes for improved performance of ADLs.         Start:  10/18/24    Expected End:  01/16/25            Pt will ambulate moderate community distances across all surfaces without deviation and without pain.        Start:  10/18/24    Expected End:  01/16/25            Pt will stand for >30 minutes with UE support PRN without increase in pain for improved tolerance to household tasks such as cooking.         Start:  10/18/24    Expected End:  01/16/25            Pt will perform 5x sit to  11s without pain for improved functional strength and mobility.        Start:  10/18/24    Expected End:  01/16/25

## 2024-10-18 ENCOUNTER — EVALUATION (OUTPATIENT)
Dept: PHYSICAL THERAPY | Facility: CLINIC | Age: 69
End: 2024-10-18
Payer: MEDICARE

## 2024-10-18 DIAGNOSIS — M47.817 LUMBOSACRAL SPONDYLOSIS WITHOUT MYELOPATHY: ICD-10-CM

## 2024-10-18 DIAGNOSIS — G89.29 OTHER CHRONIC PAIN: ICD-10-CM

## 2024-10-18 DIAGNOSIS — M48.062 SPINAL STENOSIS, LUMBAR REGION, WITH NEUROGENIC CLAUDICATION: Primary | ICD-10-CM

## 2024-10-18 PROCEDURE — 97110 THERAPEUTIC EXERCISES: CPT | Mod: GP

## 2024-10-18 PROCEDURE — 97161 PT EVAL LOW COMPLEX 20 MIN: CPT | Mod: GP

## 2024-10-18 ASSESSMENT — PAIN - FUNCTIONAL ASSESSMENT: PAIN_FUNCTIONAL_ASSESSMENT: 0-10

## 2024-10-18 ASSESSMENT — PAIN DESCRIPTION - DESCRIPTORS: DESCRIPTORS: ACHING;NUMBNESS;SHOOTING

## 2024-10-18 ASSESSMENT — PAIN SCALES - GENERAL: PAINLEVEL_OUTOF10: 8

## 2024-10-21 ENCOUNTER — TELEPHONE (OUTPATIENT)
Dept: OTOLARYNGOLOGY | Facility: CLINIC | Age: 69
End: 2024-10-21
Payer: MEDICARE

## 2024-10-24 PROBLEM — M48.062 SPINAL STENOSIS, LUMBAR REGION, WITH NEUROGENIC CLAUDICATION: Status: ACTIVE | Noted: 2021-06-25

## 2024-10-24 PROBLEM — Z96.651 HISTORY OF TOTAL RIGHT KNEE REPLACEMENT: Status: RESOLVED | Noted: 2023-09-06 | Resolved: 2024-10-24

## 2024-10-24 PROBLEM — M47.817 LUMBOSACRAL SPONDYLOSIS WITHOUT MYELOPATHY: Status: ACTIVE | Noted: 2023-09-06

## 2024-10-24 PROBLEM — R41.82 ALTERED MENTAL STATUS: Status: RESOLVED | Noted: 2023-09-06 | Resolved: 2024-10-24

## 2024-10-24 PROBLEM — R91.1 SOLITARY PULMONARY NODULE: Status: ACTIVE | Noted: 2024-02-13

## 2024-10-30 ENCOUNTER — ANCILLARY PROCEDURE (OUTPATIENT)
Dept: RADIOLOGY | Facility: EXTERNAL LOCATION | Age: 69
End: 2024-10-30
Payer: MEDICARE

## 2024-10-30 DIAGNOSIS — M48.062 SPINAL STENOSIS, LUMBAR REGION WITH NEUROGENIC CLAUDICATION: ICD-10-CM

## 2024-10-30 DIAGNOSIS — Z00.6 ENCOUNTER FOR EXAMINATION FOR NORMAL COMPARISON AND CONTROL IN CLINICAL RESEARCH PROGRAM: ICD-10-CM

## 2024-10-30 PROCEDURE — 0275T PR PERC LAMINO-/LAMINECTOMY INDIR IMAG GUIDE LUMBAR: CPT | Performed by: ANESTHESIOLOGY

## 2024-11-04 NOTE — PROGRESS NOTES
Subjective   Patient ID: Dyana Irene is a 69 y.o. female who presents for No chief complaint on file..    HPI   Pt here to establish. Pmh sig for htn, cad, h/o of dvt and PE, hyperglycemia, HLD, zeb.    She sees cardio for her htn, mvr and HLD.   She also has chronic back  pain and sees PM: Vucetic.    She also sees ENT for her chronic PND/cough.      Past Medical History:   Diagnosis Date    Abnormal electrocardiography 12/30/2022    Allergic rhinitis 02/13/2024    [facility]    Anxiety 09/06/2023    Aortic valve sclerosis 09/06/2023    Arthropathy of hand 02/13/2024    Atrophic emphysema (Multi) 09/06/2023    Benign essential hypertension 09/06/2023    Bilateral carotid artery stenosis 10/24/2022    Chronic bilateral low back pain with bilateral sciatica 11/06/2023    Cigarette smoker 02/13/2024    Coronary artery disease involving native coronary artery without angina pectoris 09/06/2023    COVID-19 11/04/2021    COVID-19    Depressive disorder 02/13/2024    Disc degeneration, lumbar 09/06/2023    Displaced fracture of proximal phalanx of unspecified finger, initial encounter for closed fracture 11/25/2019    Closed fracture of shaft of proximal phalanx of finger    Diverticulosis 09/06/2023    Elevated coronary artery calcium score 09/06/2023    CT Coronary artery calcium score (6/17): 736      Enlarged liver 09/06/2023    Fibromyalgia 09/06/2023    Hearing loss 02/13/2024    History of deep vein thrombosis 09/06/2023    History of depression 02/13/2024    Comment on above: Added by Problem List Migration; 2013-4-7;    Impaired fasting glucose 09/06/2023    Insomnia 09/06/2023    Kidney cyst, acquired 09/06/2023    Lumbosacral spondylosis without myelopathy 09/06/2023    Lung nodules 09/06/2023    Mitral valve regurgitation 09/06/2023    Mitral valve regurgitation:  mild-mod (4/22), mild (4/24)  LVEF 60-65% (4/24)      Mixed hyperlipidemia 09/06/2023    Mixed stress and urge urinary incontinence 09/06/2023     Neuralgia and neuritis, unspecified 02/07/2017    Neuralgia    Non-pressure chronic ulcer of skin of other sites limited to breakdown of skin 02/04/2019    Chronic skin ulcer, limited to breakdown of skin    Obstructive sleep apnea 09/06/2023    Osteoarthritis of right knee 02/13/2024    Other chronic pain 10/18/2024    Vucetic    Other microscopic hematuria 07/20/2018    Microscopic hematuria    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    Neuralgia, postherpetic    Pelvic and perineal pain 07/25/2019    Pelvic pain    Persistent cough 02/13/2024    [facility]    Personal history of other diseases of the digestive system 09/27/2016    History of diverticulitis of colon    Personal history of other endocrine, nutritional and metabolic disease 02/04/2019    History of vitamin D deficiency    Personal history of pulmonary embolism 04/06/2022    History of pulmonary embolism    Pes anserinus bursitis of right knee 02/13/2024    Phlebitis and thrombophlebitis of unspecified site 05/21/2016    Superficial thrombophlebitis    Primary osteoarthritis, unspecified hand 04/26/2021    Arthritis of carpometacarpal joint    Sensorineural hearing loss, bilateral 01/19/2023    Severe obesity (Multi) 02/13/2024    Solitary pulmonary nodule 05/10/2021    Lung nodule    Spinal stenosis, lumbar region, with neurogenic claudication 06/25/2021    Squamous cell carcinoma of skin of lower limb, including hip 04/12/2023    Tobacco dependence due to cigarettes 02/13/2024    Urethral disorder, unspecified 07/20/2018    Urethra disorder    Vitamin deficiency, unspecified 02/07/2017    Vitamin deficiency    Zoster without complications 02/04/2019    Herpes zoster without complication     Current Outpatient Medications   Medication Sig Dispense Refill    albuterol sulfate (Proair Digihaler) 90 mcg/actuation aero powdr breath act w/sensor inhaler Inhale 2 puffs every 4 hours if needed for wheezing or shortness of breath. (Patient not  taking: Reported on 10/11/2024) 1 each 0    amoxicillin (Amoxil) 500 mg capsule       ascorbic acid (Vitamin C) 1,000 mg tablet Take 1 tablet (1,000 mg) by mouth once daily.      aspirin 81 mg EC tablet 1 tablet Orally Once a day      atorvastatin (Lipitor) 80 mg tablet TAKE 1 TABLET BY MOUTH EVERY DAY 90 tablet 1    azelastine (Astelin) 137 mcg (0.1 %) nasal spray Administer 2 sprays into each nostril 2 times a day. 90 mL 1    buPROPion SR (Wellbutrin SR) 100 mg 12 hr tablet TAKE ONE TABLET BY MOUTH EVERY MORNING WITH FOOD      chlorthalidone (Hygroton) 25 mg tablet TAKE 1 TABLET BY MOUTH EVERY DAY 90 tablet 1    cholecalciferol (Vitamin D-3) 50 MCG (2000 UT) tablet 1 tablet Orally Once a day      cyclobenzaprine (Flexeril) 10 mg tablet       DULoxetine (Cymbalta) 60 mg DR capsule TAKE 1 CAPSULE BY MOUTH EVERY DAY 90 capsule 1    ezetimibe (Zetia) 10 mg tablet TAKE 1 TABLET BY MOUTH EVERY DAY 90 tablet 3    ezetimibe-simvastatin (Vytorin) 10-40 mg tablet 1 tab(s) orally once a day      fluticasone (Flonase) 50 mcg/actuation nasal spray Administer 1 spray into each nostril once daily. Shake gently. Before first use, prime pump. After use, clean tip and replace cap. 16 g 11    fluticasone (Flonase) 50 mcg/actuation nasal spray Administer 2 sprays into each nostril once daily. Shake gently. Before first use, prime pump. After use, clean tip and replace cap. 48 mL 1    ibuprofen 800 mg tablet       ipratropium (Atrovent) 21 mcg (0.03 %) nasal spray USE 2 SPRAYS IN EACH NOSTRIL 2-3 TIMES DAILY.      losartan (Cozaar) 100 mg tablet TAKE 1 TABLET BY MOUTH EVERY DAY 90 tablet 1    losartan-hydrochlorothiazide (Hyzaar) 100-12.5 mg tablet 1 tab(s) orally once a day      metoprolol succinate XL (Toprol-XL) 25 mg 24 hr tablet TAKE 1 TABLET BY MOUTH EVERY DAY 90 tablet 1    metoprolol tartrate (Lopressor) 25 mg tablet orally once a day      MULTIVITAMIN ORAL 1 tablet Orally Once a day      multivitamin-min-iron-FA-vit K (Adults  Multivitamin) 18 mg iron-400 mcg-25 mcg tablet TAKE 1 TABLET DAILY.      polyethylene glycol-electrolytes (Golytely) 420 gram solution Begin drinking first half of prep 6pm the night before procedure. Start second half 5 hours before procedure time. (Patient not taking: Reported on 10/11/2024) 4000 mL 0    potassium chloride CR 20 mEq ER tablet TAKE 1 TABLET BY MOUTH EVERY DAY 90 tablet 1    rosuvastatin (Crestor) 40 mg tablet TAKE 1 TABLET BY MOUTH EVERY DAY      sod sulf-pot chloride-mag sulf (Sutab) 1.479-0.188- 0.225 gram tablet Starting at 6pm open one bottle of pills and fill glass provided with water and drink according to prep sheet. Start the 2nd bottle with directions on prep sheet 5 hours before procedure time 24 tablet 0    topiramate (Topamax) 100 mg tablet 1 tablet Orally every 12 hours      traMADol (Ultram) 50 mg tablet Take 2 tablets (100 mg) by mouth 2 times a day as needed for severe pain (7 - 10). Do not fill before October 24, 2024. 120 tablet 0    varenicline (Chantix KARIN) 0.5 mg (11)- 1 mg (42) tablet Use as directed on package instructions, try to quit smoking after 1 week. 1 each 0    varenicline (Chantix) 1 mg tablet Take 1 tablet (1 mg) by mouth 2 times a day. Take with full glass of water. (Patient not taking: Reported on 10/11/2024) 180 tablet 3     No current facility-administered medications for this visit.       Review of Systems see hpi    Objective   There were no vitals taken for this visit.    Physical Exam    Assessment/Plan   Assessment & Plan  Benign essential hypertension         Mixed hyperlipidemia         Impaired fasting glucose

## 2024-11-05 ENCOUNTER — OFFICE VISIT (OUTPATIENT)
Dept: PAIN MEDICINE | Facility: CLINIC | Age: 69
End: 2024-11-05
Payer: MEDICARE

## 2024-11-05 VITALS
RESPIRATION RATE: 16 BRPM | WEIGHT: 247 LBS | HEIGHT: 66 IN | BODY MASS INDEX: 39.7 KG/M2 | SYSTOLIC BLOOD PRESSURE: 118 MMHG | DIASTOLIC BLOOD PRESSURE: 72 MMHG

## 2024-11-05 DIAGNOSIS — M48.062 SPINAL STENOSIS, LUMBAR REGION, WITH NEUROGENIC CLAUDICATION: Primary | ICD-10-CM

## 2024-11-05 PROCEDURE — 1157F ADVNC CARE PLAN IN RCRD: CPT | Performed by: ANESTHESIOLOGY

## 2024-11-05 PROCEDURE — 1159F MED LIST DOCD IN RCRD: CPT | Performed by: ANESTHESIOLOGY

## 2024-11-05 PROCEDURE — 1125F AMNT PAIN NOTED PAIN PRSNT: CPT | Performed by: ANESTHESIOLOGY

## 2024-11-05 PROCEDURE — 99211 OFF/OP EST MAY X REQ PHY/QHP: CPT | Performed by: ANESTHESIOLOGY

## 2024-11-05 PROCEDURE — 1160F RVW MEDS BY RX/DR IN RCRD: CPT | Performed by: ANESTHESIOLOGY

## 2024-11-05 PROCEDURE — 3078F DIAST BP <80 MM HG: CPT | Performed by: ANESTHESIOLOGY

## 2024-11-05 PROCEDURE — 3074F SYST BP LT 130 MM HG: CPT | Performed by: ANESTHESIOLOGY

## 2024-11-05 PROCEDURE — 3008F BODY MASS INDEX DOCD: CPT | Performed by: ANESTHESIOLOGY

## 2024-11-05 ASSESSMENT — PAIN - FUNCTIONAL ASSESSMENT: PAIN_FUNCTIONAL_ASSESSMENT: 0-10

## 2024-11-05 ASSESSMENT — ENCOUNTER SYMPTOMS
BACK PAIN: 1
HEMATOLOGIC/LYMPHATIC NEGATIVE: 1
EYES NEGATIVE: 1
CARDIOVASCULAR NEGATIVE: 1
ALLERGIC/IMMUNOLOGIC NEGATIVE: 1
ENDOCRINE NEGATIVE: 1
CONSTITUTIONAL NEGATIVE: 1
GASTROINTESTINAL NEGATIVE: 1
NEUROLOGICAL NEGATIVE: 1
RESPIRATORY NEGATIVE: 1
PSYCHIATRIC NEGATIVE: 1

## 2024-11-05 ASSESSMENT — PAIN DESCRIPTION - DESCRIPTORS: DESCRIPTORS: ACHING

## 2024-11-05 ASSESSMENT — PAIN SCALES - GENERAL
PAINLEVEL_OUTOF10: 4
PAINLEVEL_OUTOF10: 4

## 2024-11-05 NOTE — PROGRESS NOTES
Subjective   Patient ID: Dyana Irene is a 69 y.o. female who presents for Back Pain.  Back Pain      Patient here today for follow up today.  She had a L2/3 and l3/4 MILD procedure 1 week ago and she is doing well.  She states that she is walking better and her leg and back pain have reduced.  She has recovered from the procedure without any issues.  She is rating her pain as a 4/10 today.  She wants to restart aqua therapy.  Patient very happy with the outcomes of the procedure at this time  Review of Systems   Constitutional: Negative.    HENT: Negative.     Eyes: Negative.    Respiratory: Negative.     Cardiovascular: Negative.    Gastrointestinal: Negative.    Endocrine: Negative.    Genitourinary: Negative.    Musculoskeletal:  Positive for back pain.   Skin: Negative.    Allergic/Immunologic: Negative.    Neurological: Negative.    Hematological: Negative.    Psychiatric/Behavioral: Negative.         Objective   Physical Exam  Vitals and nursing note reviewed.   Constitutional:       Appearance: Normal appearance.   HENT:      Head: Normocephalic and atraumatic.      Right Ear: Ear canal and external ear normal.      Left Ear: Ear canal and external ear normal.      Nose: Nose normal.      Mouth/Throat:      Mouth: Mucous membranes are moist.      Pharynx: Oropharynx is clear.   Eyes:      Conjunctiva/sclera: Conjunctivae normal.      Pupils: Pupils are equal, round, and reactive to light.   Cardiovascular:      Rate and Rhythm: Normal rate.   Pulmonary:      Effort: Pulmonary effort is normal. No respiratory distress.   Musculoskeletal:      Cervical back: Normal range of motion and neck supple.      Lumbar back: Tenderness present. Decreased range of motion. Negative right straight leg raise test and negative left straight leg raise test. No scoliosis.        Back:    Skin:     General: Skin is warm and dry.   Neurological:      Mental Status: She is alert and oriented to person, place, and time.       Sensory: Sensation is intact.      Motor: Motor function is intact.      Coordination: Coordination abnormal.      Gait: Gait abnormal.      Comments: Patient walks with lumbar flexed position.  Patient can stand from seated position unassisted.   Psychiatric:         Mood and Affect: Mood normal.         Thought Content: Thought content normal.         Assessment/Plan   Problem List Items Addressed This Visit             ICD-10-CM    Spinal stenosis, lumbar region, with neurogenic claudication - Primary M48.062     I nice discussion with the patient today our plan will be as follows.    Radiology: All available imaging was reviewed today.    Physically: Patient to restart aqua therapy next week.  Patient to continue with daily walking.    Psychologically: No issues at this time.    Medication: No changes at this time.    Duration: Greater than 1 year.    Intervention: Patient doing well after L2-3 and L3-4 mild procedure 1 week ago.  She is already starting to see some improvement in her durability and longevity with walking and standing.  We will have the patient follow-up in 3 months.        Please note that this report has been produced using speech recognition software. It may contain errors related to grammar, punctuation or spelling. Electronically signed, but not reviewed.          Osman Lux MD 11/05/24 11:38 AM

## 2024-11-07 ENCOUNTER — HOSPITAL ENCOUNTER (OUTPATIENT)
Dept: RADIOLOGY | Facility: CLINIC | Age: 69
Discharge: HOME | End: 2024-11-07
Payer: MEDICARE

## 2024-11-07 DIAGNOSIS — F17.210 CIGARETTE NICOTINE DEPENDENCE WITHOUT COMPLICATION: ICD-10-CM

## 2024-11-07 PROCEDURE — 71271 CT THORAX LUNG CANCER SCR C-: CPT

## 2024-11-09 DIAGNOSIS — E78.5 HYPERLIPIDEMIA, UNSPECIFIED: ICD-10-CM

## 2024-11-09 DIAGNOSIS — F51.01 PRIMARY INSOMNIA: ICD-10-CM

## 2024-11-11 RX ORDER — ATORVASTATIN CALCIUM 80 MG/1
80 TABLET, FILM COATED ORAL DAILY
Qty: 90 TABLET | Refills: 0 | Status: SHIPPED | OUTPATIENT
Start: 2024-11-11

## 2024-11-11 RX ORDER — DULOXETIN HYDROCHLORIDE 60 MG/1
60 CAPSULE, DELAYED RELEASE ORAL DAILY
Qty: 90 CAPSULE | Refills: 0 | Status: SHIPPED | OUTPATIENT
Start: 2024-11-11

## 2024-11-12 ENCOUNTER — APPOINTMENT (OUTPATIENT)
Dept: PRIMARY CARE | Facility: CLINIC | Age: 69
End: 2024-11-12
Payer: MEDICARE

## 2024-11-12 NOTE — PROGRESS NOTES
Physical Therapy Treatment    Patient Name: Dyana Irene  MRN: [id number]  Encounter date:  11/13/2024             Visit Number:  2 (including evaluation)  Planned total visits:    Visits Authorized/Insurance Coverage:  MEDICARE A/B, MMO [place], [place], [place]     Current Problem  Problem List Items Addressed This Visit    None      Surgery  MILD 10/30    Precautions       Pain       Subjective  ***    Objective  ***    Treatment:  {PT Treatments:95441}  Walk x3 laps fwd, bwd, s/s across pool    At rail, 4' depth, cues for TrA brace, x15 ea:  Heel raise  Hip abduction  Hip extension  Hip flexion/SLR  Hamstring curl  March in place  Mini squat    Deep, 5' with noodle under arms, BUE support:  Decompression x2'  Hip abd/adduction x2'  XC ski x2'  Bike x2'  Decompression x2'      Current HEP:  Access Code: [email or web address]  URL: [email or web address]  Date: 10/18/2024  Prepared by: Raymond Huddleston    Exercises  - Seated Hamstring Stretch  - 1 x daily - 7 x weekly - 1 sets - 3 reps - 30 second hold  - Seated Transversus Abdominis Bracing  - 1 x daily - 7 x weekly - 2 sets - 10 reps - 3 second hold  - Seated Hip Adduction Squeeze with Ball  - 1 x daily - 7 x weekly - 2 sets - 10 reps  - Seated Hip Abduction with Resistance  - 1 x daily - 7 x weekly - 2 sets - 10 reps  - Seated March  - 1 x daily - 7 x weekly - 2 sets - 10 reps    Assessment:  Pt's response to treatment:  ***  Areas of improvements:  ***  Limitations/deficits:  ***    Pain end of session: ***    Plan:     {BASPLAN:46900}    Assessment of current progress against goals:  {BASPTNOTEGOALASSESSMENT:74287}    Goals:  Active       PT Problem       STG, 5 visits       Start:  10/18/24    Expected End:  12/02/24       Pt will be independent in HEP to improve LE and core strength, mobility, and function.  Pt will report 50% reduction in pain with functional mobility such as standing, walking, and stairs.         Pt will increase strength in core and LEs by 1/2 MMT  in all planes for improved performance of functional mobility.         Start:  10/18/24    Expected End:  01/16/25            Pt will demonstrate no more than 25% limitation in lumbar AROM without pain in all planes for improved performance of ADLs.         Start:  10/18/24    Expected End:  01/16/25            Pt will ambulate moderate community distances across all surfaces without deviation and without pain.        Start:  10/18/24    Expected End:  01/16/25            Pt will stand for >30 minutes with UE support PRN without increase in pain for improved tolerance to household tasks such as cooking.         Start:  10/18/24    Expected End:  01/16/25            Pt will perform 5x sit to  11s without pain for improved functional strength and mobility.        Start:  10/18/24    Expected End:  01/16/25

## 2024-11-13 ENCOUNTER — APPOINTMENT (OUTPATIENT)
Dept: PHYSICAL THERAPY | Facility: CLINIC | Age: 69
End: 2024-11-13
Payer: MEDICARE

## 2024-11-14 NOTE — PROGRESS NOTES
"    Physical Therapy Treatment    Patient Name: Dyana Irene  MRN: [id number]  Encounter date:  11/15/2024  Time Calculation  Start Time: 0730  Stop Time: 0810  Time Calculation (min): 40 min     PT Therapeutic Procedures Time Entry  Therapeutic Exercise Time Entry: 40    Visit Number:  2 (including evaluation)  Planned total visits: 10 visits  Visits Authorized/Insurance Coverage:  MEDICARE A/B, MMO [place]PP, [place], [place] AUTH     Current Problem  Problem List Items Addressed This Visit             ICD-10-CM    Spinal stenosis, lumbar region, with neurogenic claudication M48.062    Lumbosacral spondylosis without myelopathy M47.817    Other chronic pain G89.29       Surgery  MILD 10/30    Precautions  Precautions  Precautions Comment: None    Pain  Pain Assessment: 0-10  0-10 (Numeric) Pain Score: 8    Subjective  Pt had MILD procedure 10/30 - cleared for all activity by surgeon. No change in pain since. Pt describes severe pain across her back and into L glutes. Pt scheduled     Objective  Guarded posture with all mobility, pain with lumbar extension.     Treatment:     NuStep L1 x 5'    Supine:  Assisted SKTC 3x30\"  SKTC with towel 3x30\" ea  Piriformis stretch with towel 3x30\" ea   LTR x10  PPT x10  Isometric hip adduction x10 3\" hold    Seated lumbar flexion stretch with ball x10 5\" hold     Current HEP:  Access Code: [email or web address]  URL: [email or web address]  Date: 10/18/2024  Prepared by: Raymond Huddleston    Exercises  - Seated Hamstring Stretch  - 1 x daily - 7 x weekly - 1 sets - 3 reps - 30 second hold  - Seated Transversus Abdominis Bracing  - 1 x daily - 7 x weekly - 2 sets - 10 reps - 3 second hold  - Seated Hip Adduction Squeeze with Ball  - 1 x daily - 7 x weekly - 2 sets - 10 reps  - Seated Hip Abduction with Resistance  - 1 x daily - 7 x weekly - 2 sets - 10 reps  - Seated March  - 1 x daily - 7 x weekly - 2 sets - 10 reps    Assessment:  Pt's response to treatment:  Visit on land today as pt forgot her " [email or web address]. Plan to begin pool at next visit. Pt had difficulty with exercise initially but improved with repetition. Pain down at exit, and above exercises all issued for HEP.   Areas of improvements:  some pain relief with treatment   Limitations/deficits: very guarded and painful ROM     Pain end of session: 7/10    Plan:  OP PT Plan  Treatment/Interventions: Aquatic therapy, Dry needling, Electrical stimulation, Manual therapy, Neuromuscular re-education, Taping techniques, Therapeutic activities, Therapeutic exercises, Ultrasound  PT Plan: Skilled PT  PT Frequency: 2 times per week  Duration: 10 visits  Certification Period Start Date: 10/18/24  Certification Period End Date: 01/16/25  Number of Treatments Authorized: [place]  Rehab Potential: Good  Plan of Care Agreement: Patient  Continue with current POC/no changes    Assessment of current progress against goals:  Insufficient treatment time to assess progress    Goals:  Active       PT Problem       STG, 5 visits       Start:  10/18/24    Expected End:  12/02/24       Pt will be independent in HEP to improve LE and core strength, mobility, and function.  Pt will report 50% reduction in pain with functional mobility such as standing, walking, and stairs.         Pt will increase strength in core and LEs by 1/2 MMT in all planes for improved performance of functional mobility.         Start:  10/18/24    Expected End:  01/16/25            Pt will demonstrate no more than 25% limitation in lumbar AROM without pain in all planes for improved performance of ADLs.         Start:  10/18/24    Expected End:  01/16/25            Pt will ambulate moderate community distances across all surfaces without deviation and without pain.        Start:  10/18/24    Expected End:  01/16/25            Pt will stand for >30 minutes with UE support PRN without increase in pain for improved tolerance to household tasks such as cooking.         Start:  10/18/24    Expected End:  01/16/25             Pt will perform 5x sit to  11s without pain for improved functional strength and mobility.        Start:  10/18/24    Expected End:  01/16/25

## 2024-11-15 ENCOUNTER — CLINICAL SUPPORT (OUTPATIENT)
Dept: PHYSICAL THERAPY | Facility: CLINIC | Age: 69
End: 2024-11-15
Payer: MEDICARE

## 2024-11-15 DIAGNOSIS — M47.817 LUMBOSACRAL SPONDYLOSIS WITHOUT MYELOPATHY: ICD-10-CM

## 2024-11-15 DIAGNOSIS — M48.062 SPINAL STENOSIS, LUMBAR REGION, WITH NEUROGENIC CLAUDICATION: ICD-10-CM

## 2024-11-15 DIAGNOSIS — G89.29 OTHER CHRONIC PAIN: ICD-10-CM

## 2024-11-15 PROCEDURE — 97110 THERAPEUTIC EXERCISES: CPT | Mod: GP

## 2024-11-15 ASSESSMENT — PAIN SCALES - GENERAL: PAINLEVEL_OUTOF10: 8

## 2024-11-15 ASSESSMENT — PAIN - FUNCTIONAL ASSESSMENT: PAIN_FUNCTIONAL_ASSESSMENT: 0-10

## 2024-11-18 NOTE — PROGRESS NOTES
Physical Therapy Treatment    Patient Name: Dyana Irene  MRN: [id number]  Encounter date:  11/19/2024             Visit Number:  3 (including evaluation)  Planned total visits:    Visits Authorized/Insurance Coverage:  MEDICARE A/B, MMO SUPP, [place], [place] AUTH     Current Problem  Problem List Items Addressed This Visit    None      Surgery  MILD 10/30    Precautions       Pain       Subjective  ***    Objective  ***    Treatment:  {PT Treatments:60640}  Walk x3 laps fwd, bwd, s/s across pool    At rail, 4' depth, cues for TrA brace, x15 ea:  Heel raise  Hip abduction  Hip extension  Hip flexion/SLR  Hamstring curl  March in place  Mini squat    Deep, 5' with noodle under arms, BUE support:  Decompression x2'  Hip abd/adduction x2'  XC ski x2'  Bike x2'  Decompression x2'      Current HEP:  Access Code: [email or web address]  URL: [email or web address]  Date: 10/18/2024  Prepared by: Raymond Huddleston    Exercises  - Seated Hamstring Stretch  - 1 x daily - 7 x weekly - 1 sets - 3 reps - 30 second hold  - Seated Transversus Abdominis Bracing  - 1 x daily - 7 x weekly - 2 sets - 10 reps - 3 second hold  - Seated Hip Adduction Squeeze with Ball  - 1 x daily - 7 x weekly - 2 sets - 10 reps  - Seated Hip Abduction with Resistance  - 1 x daily - 7 x weekly - 2 sets - 10 reps  - Seated March  - 1 x daily - 7 x weekly - 2 sets - 10 reps    Assessment:  Pt's response to treatment:  ***  Areas of improvements:  ***  Limitations/deficits:  ***    Pain end of session: ***    Plan:     {BASPLAN:78768}    Assessment of current progress against goals:  {BASPTNOTEGOALASSESSMENT:86679}    Goals:  Active       PT Problem       STG, 5 visits       Start:  10/18/24    Expected End:  12/02/24       Pt will be independent in HEP to improve LE and core strength, mobility, and function.  Pt will report 50% reduction in pain with functional mobility such as standing, walking, and stairs.         Pt will increase strength in core and LEs by 1/2 MMT  in all planes for improved performance of functional mobility.         Start:  10/18/24    Expected End:  01/16/25            Pt will demonstrate no more than 25% limitation in lumbar AROM without pain in all planes for improved performance of ADLs.         Start:  10/18/24    Expected End:  01/16/25            Pt will ambulate moderate community distances across all surfaces without deviation and without pain.        Start:  10/18/24    Expected End:  01/16/25            Pt will stand for >30 minutes with UE support PRN without increase in pain for improved tolerance to household tasks such as cooking.         Start:  10/18/24    Expected End:  01/16/25            Pt will perform 5x sit to  11s without pain for improved functional strength and mobility.        Start:  10/18/24    Expected End:  01/16/25

## 2024-11-19 ENCOUNTER — APPOINTMENT (OUTPATIENT)
Dept: PHYSICAL THERAPY | Facility: CLINIC | Age: 69
End: 2024-11-19
Payer: MEDICARE

## 2024-11-21 ENCOUNTER — TELEPHONE (OUTPATIENT)
Dept: PAIN MEDICINE | Facility: CLINIC | Age: 69
End: 2024-11-21
Payer: MEDICARE

## 2024-11-21 NOTE — PROGRESS NOTES
Physical Therapy Treatment    Patient Name: Dyana Irene  MRN: [id number]  Encounter date:  11/22/2024  Time Calculation  Start Time: 1147  Stop Time: 1227  Time Calculation (min): 40 min     PT Therapeutic Procedures Time Entry  Aquatic Therapy Time Entry: 40    Visit Number:  3 (including evaluation)  Planned total visits: 10 visits  Visits Authorized/Insurance Coverage:  MEDICARE A/B, MMO SUPP, [place], [place] AUTH     Current Problem  Problem List Items Addressed This Visit             ICD-10-CM    Spinal stenosis, lumbar region, with neurogenic claudication M48.062    Lumbosacral spondylosis without myelopathy M47.817    Other chronic pain G89.29       Surgery  MILD 10/30    Precautions  Precautions  Precautions Comment: None    Pain  Pain Assessment: 0-10  0-10 (Numeric) Pain Score: 8    Subjective  Pt frustrated because pain has increased since MILD procedure. After last PT visit she had increased symptoms for 2 days, then felt a little better. She has had a hard time moving due to symptoms.    Objective  Flexed trunk posture, guarded gait pattern on land     Treatment:  Aquatic Exercise  Aquatic Exercise Performed: Yes  Walk x3 laps fwd, bwd, s/s across pool    At rail, 4' depth, cues for TrA brace, x10 ea:  Heel raise  Hip abduction  Hip extension  Hip flexion/SLR  Hamstring curl  March in place    Deep, 5' with noodle under arms, BUE support:  Decompression x2'  Hip abd/adduction x2'  XC ski x2'  Bike x2'  Decompression x2'    Current HEP:  Access Code: [email or web address]  URL: [email or web address]  Date: 10/18/2024  Prepared by: Raymond Huddleston    Exercises  - Seated Hamstring Stretch  - 1 x daily - 7 x weekly - 1 sets - 3 reps - 30 second hold  - Seated Transversus Abdominis Bracing  - 1 x daily - 7 x weekly - 2 sets - 10 reps - 3 second hold  - Seated Hip Adduction Squeeze with Ball  - 1 x daily - 7 x weekly - 2 sets - 10 reps  - Seated Hip Abduction with Resistance  - 1 x daily - 7 x weekly - 2 sets - 10  reps  - Seated March  - 1 x daily - 7 x weekly - 2 sets - 10 reps    Assessment:  Pt's response to treatment:  Aquatics initiated to decrease pain and improve mobility. Pt had severe pain initially but felt better in water. Exercise kept somewhat light to avoid flare up following. Plan to progress as able moving forward.   Areas of improvements:  symptom relief in pool  Limitations/deficits:  activity tolerance, pain levels     Pain end of session: 5/10    Plan:  OP PT Plan  Treatment/Interventions: Aquatic therapy, Dry needling, Electrical stimulation, Manual therapy, Neuromuscular re-education, Taping techniques, Therapeutic activities, Therapeutic exercises, Ultrasound  PT Plan: Skilled PT  PT Frequency: 2 times per week  Duration: 10 visits  Certification Period Start Date: 10/18/24  Certification Period End Date: 01/16/25  Number of Treatments Authorized: [place]  Rehab Potential: Good  Plan of Care Agreement: Patient  Continue with current POC/no changes    Assessment of current progress against goals:  Insufficient treatment time to assess progress    Goals:  Active       PT Problem       STG, 5 visits       Start:  10/18/24    Expected End:  12/02/24       Pt will be independent in HEP to improve LE and core strength, mobility, and function.  Pt will report 50% reduction in pain with functional mobility such as standing, walking, and stairs.         Pt will increase strength in core and LEs by 1/2 MMT in all planes for improved performance of functional mobility.         Start:  10/18/24    Expected End:  01/16/25            Pt will demonstrate no more than 25% limitation in lumbar AROM without pain in all planes for improved performance of ADLs.         Start:  10/18/24    Expected End:  01/16/25            Pt will ambulate moderate community distances across all surfaces without deviation and without pain.        Start:  10/18/24    Expected End:  01/16/25            Pt will stand for >30 minutes with UE  support PRN without increase in pain for improved tolerance to household tasks such as cooking.         Start:  10/18/24    Expected End:  01/16/25            Pt will perform 5x sit to  11s without pain for improved functional strength and mobility.        Start:  10/18/24    Expected End:  01/16/25

## 2024-11-21 NOTE — TELEPHONE ENCOUNTER
Patient states she spoke to another nurse because she was needing a refill on her tramadol. She was told she needed to come in for an appt for a refill but she just saw dr olson on 11/5 post MILD procedure. (In the mean time she spoke with [facility] who still had 1 more script that they could fill for her now). But patient asking if she would need to make an appt for another refill of her tramadol before her 3 month follow up already scheduled for 2/4. She states she is taking the tramadol regularly-she is still having quite a bit of back pain. Please advise if she would need an earlier appt for any tramadol scripts?

## 2024-11-22 ENCOUNTER — TREATMENT (OUTPATIENT)
Dept: PHYSICAL THERAPY | Facility: CLINIC | Age: 69
End: 2024-11-22
Payer: MEDICARE

## 2024-11-22 DIAGNOSIS — M48.062 SPINAL STENOSIS, LUMBAR REGION, WITH NEUROGENIC CLAUDICATION: ICD-10-CM

## 2024-11-22 DIAGNOSIS — M47.817 LUMBOSACRAL SPONDYLOSIS WITHOUT MYELOPATHY: ICD-10-CM

## 2024-11-22 DIAGNOSIS — G89.29 OTHER CHRONIC PAIN: ICD-10-CM

## 2024-11-22 PROCEDURE — 97113 AQUATIC THERAPY/EXERCISES: CPT | Mod: GP

## 2024-11-22 ASSESSMENT — PAIN SCALES - GENERAL: PAINLEVEL_OUTOF10: 8

## 2024-11-22 ASSESSMENT — PAIN - FUNCTIONAL ASSESSMENT: PAIN_FUNCTIONAL_ASSESSMENT: 0-10

## 2024-11-22 NOTE — TELEPHONE ENCOUNTER
LEFT  FOR PATIENT INFORMING HER THAT SHE WOULD NEED AN OFFICE VISIT PRIOR TO HER NEXT TRAMADOL SCRIPT THAT WOULD BE NEEDED AND TO CALL TO SCHEDULE APPT WITHIN NEXT MONTH IF SHE IS USING HER MEDICATION ON A REGULAR BASIS

## 2024-11-25 ENCOUNTER — APPOINTMENT (OUTPATIENT)
Dept: PHYSICAL THERAPY | Facility: CLINIC | Age: 69
End: 2024-11-25
Payer: MEDICARE

## 2024-12-02 NOTE — PROGRESS NOTES
Physical Therapy Treatment    Patient Name: Dyana Irene  MRN: [id number]  Encounter date:  12/3/2024  Time Calculation  Start Time: 1117  Stop Time: 1159  Time Calculation (min): 42 min     PT Therapeutic Procedures Time Entry  Aquatic Therapy Time Entry: 42    Visit Number:  4 (including evaluation)  Planned total visits: 10 visits  Visits Authorized/Insurance Coverage:  MEDICARE A/B, MMO SUPP, [place], [place] AUTH     Current Problem  Problem List Items Addressed This Visit             ICD-10-CM    Spinal stenosis, lumbar region, with neurogenic claudication M48.062    Lumbosacral spondylosis without myelopathy M47.817    Other chronic pain G89.29       Surgery  MILD 10/30    Precautions  Precautions  Precautions Comment: None    Pain  Pain Assessment: 0-10  0-10 (Numeric) Pain Score: 7    Subjective  Pt reports excellent symptom reduction for 2-3 days after last visit. Pain returned then but at a lower intensity. She is very pleased with progress from first pool session. She has been consistent with HEP without issue.     Objective  Flexed posture in deep end, fair ability to correct with cues    Treatment:  Aquatic Exercise  Aquatic Exercise Performed: Yes  Walk x3 laps fwd, bwd, s/s across pool    At rail, 4' depth, cues for TrA brace, x10 ea:  Heel raise  Hip abduction  Hip extension  Hip flexion/SLR  Hamstring curl  March in place    Deep, 5' with noodle under arms, BUE support:  Decompression x2'  Hip abd/adduction x2'  XC ski x2'  Bike x2'  Decompression x2'    Current HEP:  Access Code: [email or web address]  URL: [email or web address]  Date: 10/18/2024  Prepared by: Raymond Huddleston    Exercises  - Seated Hamstring Stretch  - 1 x daily - 7 x weekly - 1 sets - 3 reps - 30 second hold  - Seated Transversus Abdominis Bracing  - 1 x daily - 7 x weekly - 2 sets - 10 reps - 3 second hold  - Seated Hip Adduction Squeeze with Ball  - 1 x daily - 7 x weekly - 2 sets - 10 reps  - Seated Hip Abduction with Resistance  - 1 x  daily - 7 x weekly - 2 sets - 10 reps  - Seated March  - 1 x daily - 7 x weekly - 2 sets - 10 reps    Assessment:  Pt's response to treatment: Exercise in pool kept consistent with last visit due to pain relief/mobility improvements. Pt less guarded in water but did have some pain on L side with ROM exercises. No significant relief or change in symptoms at exit.  Areas of improvements: less guarding  Limitations/deficits: pain levels remain high overall     Pain end of session: 7/10    Plan:  OP PT Plan  Treatment/Interventions: Aquatic therapy, Dry needling, Electrical stimulation, Manual therapy, Neuromuscular re-education, Taping techniques, Therapeutic activities, Therapeutic exercises, Ultrasound  PT Plan: Skilled PT  PT Frequency: 2 times per week  Duration: 10 visits  Certification Period Start Date: 10/18/24  Certification Period End Date: 01/16/25  Number of Treatments Authorized: [place]  Rehab Potential: Good  Plan of Care Agreement: Patient  Continue with current POC/no changes    Assessment of current progress against goals:  Insufficient treatment time to assess progress    Goals:  Active       PT Problem       STG, 5 visits       Start:  10/18/24    Expected End:  12/02/24       Pt will be independent in HEP to improve LE and core strength, mobility, and function.  Pt will report 50% reduction in pain with functional mobility such as standing, walking, and stairs.         Pt will increase strength in core and LEs by 1/2 MMT in all planes for improved performance of functional mobility.         Start:  10/18/24    Expected End:  01/16/25            Pt will demonstrate no more than 25% limitation in lumbar AROM without pain in all planes for improved performance of ADLs.         Start:  10/18/24    Expected End:  01/16/25            Pt will ambulate moderate community distances across all surfaces without deviation and without pain.        Start:  10/18/24    Expected End:  01/16/25            Pt will stand  for >30 minutes with UE support PRN without increase in pain for improved tolerance to household tasks such as cooking.         Start:  10/18/24    Expected End:  01/16/25            Pt will perform 5x sit to  11s without pain for improved functional strength and mobility.        Start:  10/18/24    Expected End:  01/16/25

## 2024-12-03 ENCOUNTER — TREATMENT (OUTPATIENT)
Dept: PHYSICAL THERAPY | Facility: CLINIC | Age: 69
End: 2024-12-03
Payer: MEDICARE

## 2024-12-03 DIAGNOSIS — M48.062 SPINAL STENOSIS, LUMBAR REGION, WITH NEUROGENIC CLAUDICATION: ICD-10-CM

## 2024-12-03 DIAGNOSIS — G89.29 OTHER CHRONIC PAIN: ICD-10-CM

## 2024-12-03 DIAGNOSIS — M47.817 LUMBOSACRAL SPONDYLOSIS WITHOUT MYELOPATHY: ICD-10-CM

## 2024-12-03 PROCEDURE — 97113 AQUATIC THERAPY/EXERCISES: CPT | Mod: GP

## 2024-12-03 ASSESSMENT — PAIN - FUNCTIONAL ASSESSMENT: PAIN_FUNCTIONAL_ASSESSMENT: 0-10

## 2024-12-03 ASSESSMENT — PAIN SCALES - GENERAL: PAINLEVEL_OUTOF10: 7

## 2024-12-05 NOTE — PROGRESS NOTES
Physical Therapy Treatment    Patient Name: Dyana Irene  MRN: [id number]  Encounter date:  12/6/2024             Visit Number:  5 (including evaluation)  Planned total visits:    Visits Authorized/Insurance Coverage:  MEDICARE A/B, MMO SUPP, [place], NO AUTH     Current Problem  Problem List Items Addressed This Visit    None        Surgery  MILD 10/30    Precautions       Pain       Subjective  ***    Objective  ***    Treatment:     Walk x3 laps fwd, bwd, s/s across pool    At rail, 4' depth, cues for TrA brace, x10 ea:  Heel raise  Hip abduction  Hip extension  Hip flexion/SLR  Hamstring curl  March in place    Deep, 5' with noodle under arms, BUE support:  Decompression x2'  Hip abd/adduction x2'  XC ski x2'  Bike x2'  Decompression x2'    Current HEP:  Access Code: [email or web address]  URL: [email or web address]  Date: 10/18/2024  Prepared by: Raymond Huddleston    Exercises  - Seated Hamstring Stretch  - 1 x daily - 7 x weekly - 1 sets - 3 reps - 30 second hold  - Seated Transversus Abdominis Bracing  - 1 x daily - 7 x weekly - 2 sets - 10 reps - 3 second hold  - Seated Hip Adduction Squeeze with Ball  - 1 x daily - 7 x weekly - 2 sets - 10 reps  - Seated Hip Abduction with Resistance  - 1 x daily - 7 x weekly - 2 sets - 10 reps  - Seated March  - 1 x daily - 7 x weekly - 2 sets - 10 reps    Assessment:  Pt's response to treatment: ***  Areas of improvements: ***  Limitations/deficits: ***    Pain end of session: ***/10    Plan:     Continue with current POC/no changes    Assessment of current progress against goals:  Insufficient treatment time to assess progress    Goals:  Active       PT Problem       STG, 5 visits       Start:  10/18/24    Expected End:  12/02/24       Pt will be independent in HEP to improve LE and core strength, mobility, and function.  Pt will report 50% reduction in pain with functional mobility such as standing, walking, and stairs.         Pt will increase strength in core and LEs by 1/2  MMT in all planes for improved performance of functional mobility.         Start:  10/18/24    Expected End:  01/16/25            Pt will demonstrate no more than 25% limitation in lumbar AROM without pain in all planes for improved performance of ADLs.         Start:  10/18/24    Expected End:  01/16/25            Pt will ambulate moderate community distances across all surfaces without deviation and without pain.        Start:  10/18/24    Expected End:  01/16/25            Pt will stand for >30 minutes with UE support PRN without increase in pain for improved tolerance to household tasks such as cooking.         Start:  10/18/24    Expected End:  01/16/25            Pt will perform 5x sit to  11s without pain for improved functional strength and mobility.        Start:  10/18/24    Expected End:  01/16/25

## 2024-12-06 ENCOUNTER — APPOINTMENT (OUTPATIENT)
Dept: PHYSICAL THERAPY | Facility: CLINIC | Age: 69
End: 2024-12-06
Payer: MEDICARE

## 2024-12-09 NOTE — PROGRESS NOTES
"    Physical Therapy Treatment    Patient Name: Dyana Irene  MRN: [id number]  Encounter date:  12/10/2024  Time Calculation  Start Time: 1120  Stop Time: 1159  Time Calculation (min): 39 min     PT Therapeutic Procedures Time Entry  Therapeutic Exercise Time Entry: 39    Visit Number:  5 (including evaluation)  Planned total visits: 10 visits  Visits Authorized/Insurance Coverage:  MEDICARE A/B, MMO [place]PP, [place], NO AUTH     Current Problem  Problem List Items Addressed This Visit             ICD-10-CM    Spinal stenosis, lumbar region, with neurogenic claudication M48.062    Lumbosacral spondylosis without myelopathy M47.817    Other chronic pain G89.29         Surgery  MILD 10/30    Precautions  Precautions  Precautions Comment: None    Pain  Pain Assessment: 0-10  0-10 (Numeric) Pain Score: 8    Subjective  Pt had increase in pain after last visit, no relief the following days. She is very discouraged by pain. She is frustrated that pain increases no matter what she does.     Objective  Flexed trunk posture, forward head and rounded shoulders both on deck and in pool    Treatment:  Aquatic Exercise  Aquatic Exercise Performed: Yes  Walk x3 laps fwd, bwd, s/s across pool    At rail, 4' depth, cues for TrA brace, x10 ea:  Heel raise  Hip abduction  Hip extension  Hip flexion/SLR    Seated lumbar flexion stretch on pool bench x10 3\" hold    Deep, 5' with noodle under arms, BUE support:  Decompression x2'  Hip abd/adduction x2'  XC ski x2'  Bike x2'  Decompression x2'    Current HEP:  Access Code: [email or web address]  URL: [email or web address]  Date: 10/18/2024  Prepared by: Raymond Huddleston    Exercises  - Seated Hamstring Stretch  - 1 x daily - 7 x weekly - 1 sets - 3 reps - 30 second hold  - Seated Transversus Abdominis Bracing  - 1 x daily - 7 x weekly - 2 sets - 10 reps - 3 second hold  - Seated Hip Adduction Squeeze with Ball  - 1 x daily - 7 x weekly - 2 sets - 10 reps  - Seated Hip Abduction with Resistance  - 1 x " daily - 7 x weekly - 2 sets - 10 reps  - Seated March  - 1 x daily - 7 x weekly - 2 sets - 10 reps    Assessment:  Pt's response to treatment: Pt encouraged to keep exercises in small ROM, and both in clinic at home avoid bending/twisting as able to prevent sharp increases in pain. Pt still had some pain around L SIJ in pool however this was less significant when pt was more deliberate with mobility. Pt felt best pain relief from lumbar flexion stretch as well as deep end decompression.   Areas of improvements: some pain relief today   Limitations/deficits: limited     Pain end of session: 5/10    Plan:  OP PT Plan  Treatment/Interventions: Aquatic therapy, Dry needling, Electrical stimulation, Manual therapy, Neuromuscular re-education, Taping techniques, Therapeutic activities, Therapeutic exercises, Ultrasound  PT Plan: Skilled PT  PT Frequency: 2 times per week  Duration: 10 visits  Certification Period Start Date: 10/18/24  Certification Period End Date: 01/16/25  Number of Treatments Authorized: MN  Rehab Potential: Good  Plan of Care Agreement: Patient  Continue with current POC/no changes    Assessment of current progress against goals:  Symptomatic relief with treatment    Goals:  Active       PT Problem       STG, 5 visits       Start:  10/18/24    Expected End:  12/02/24       Pt will be independent in HEP to improve LE and core strength, mobility, and function.  Pt will report 50% reduction in pain with functional mobility such as standing, walking, and stairs.         Pt will increase strength in core and LEs by 1/2 MMT in all planes for improved performance of functional mobility.         Start:  10/18/24    Expected End:  01/16/25            Pt will demonstrate no more than 25% limitation in lumbar AROM without pain in all planes for improved performance of ADLs.         Start:  10/18/24    Expected End:  01/16/25            Pt will ambulate moderate community distances across all surfaces without  deviation and without pain.        Start:  10/18/24    Expected End:  01/16/25            Pt will stand for >30 minutes with UE support PRN without increase in pain for improved tolerance to household tasks such as cooking.         Start:  10/18/24    Expected End:  01/16/25            Pt will perform 5x sit to  11s without pain for improved functional strength and mobility.        Start:  10/18/24    Expected End:  01/16/25

## 2024-12-10 ENCOUNTER — TREATMENT (OUTPATIENT)
Dept: PHYSICAL THERAPY | Facility: CLINIC | Age: 69
End: 2024-12-10
Payer: MEDICARE

## 2024-12-10 DIAGNOSIS — G89.29 OTHER CHRONIC PAIN: ICD-10-CM

## 2024-12-10 DIAGNOSIS — M47.817 LUMBOSACRAL SPONDYLOSIS WITHOUT MYELOPATHY: ICD-10-CM

## 2024-12-10 DIAGNOSIS — M48.062 SPINAL STENOSIS, LUMBAR REGION, WITH NEUROGENIC CLAUDICATION: ICD-10-CM

## 2024-12-10 PROCEDURE — 97110 THERAPEUTIC EXERCISES: CPT | Mod: GP

## 2024-12-10 ASSESSMENT — PAIN - FUNCTIONAL ASSESSMENT: PAIN_FUNCTIONAL_ASSESSMENT: 0-10

## 2024-12-10 ASSESSMENT — PAIN SCALES - GENERAL: PAINLEVEL_OUTOF10: 8

## 2024-12-12 ENCOUNTER — APPOINTMENT (OUTPATIENT)
Dept: PRIMARY CARE | Facility: CLINIC | Age: 69
End: 2024-12-12
Payer: MEDICARE

## 2024-12-12 DIAGNOSIS — R73.01 IMPAIRED FASTING GLUCOSE: ICD-10-CM

## 2024-12-12 DIAGNOSIS — I10 BENIGN ESSENTIAL HYPERTENSION: Primary | ICD-10-CM

## 2024-12-12 DIAGNOSIS — E78.2 MIXED HYPERLIPIDEMIA: ICD-10-CM

## 2024-12-12 NOTE — PROGRESS NOTES
"    Physical Therapy Treatment    Patient Name: Dyana Irene  MRN: [id number]  Encounter date:  12/13/2024  Time Calculation  Start Time: 1130  Stop Time: 1213  Time Calculation (min): 43 min     PT Therapeutic Procedures Time Entry  Aquatic Therapy Time Entry: 43    Visit Number:  6 (including evaluation)  Planned total visits: 10 visits  Visits Authorized/Insurance Coverage:  MEDICARE A/B, MMO SUPP, [place], NO AUTH     Current Problem  Problem List Items Addressed This Visit             ICD-10-CM    Spinal stenosis, lumbar region, with neurogenic claudication M48.062    Lumbosacral spondylosis without myelopathy M47.817    Other chronic pain G89.29         Surgery  MILD 10/30    Precautions  Precautions  Precautions Comment: None    Pain  Pain Assessment: 0-10  0-10 (Numeric) Pain Score: 4    Subjective  Symptoms have been better in back since last visit. Pt has been more deliberate with mobility, especially avoiding twisting. She does express some LE muscle soreness from exercise.    Objective  Mild flexed trunk posture, less guarding with mobility compared to previous visits     Treatment:  Aquatic Exercise  Aquatic Exercise Performed: Yes  Walk x3 laps fwd, bwd, s/s across pool    Hamstring curl 2x30\" ea     At rail, 4' depth, cues for TrA brace, x15 ea:  Heel raise  Hip abduction  Hip extension  Hip flexion/SLR  Hamstring curl  March in place    Deep, 5' with noodle under arms, BUE support:  Decompression x2'  Hip abd/adduction x2'  XC ski x2'  Bike x2'  Decompression x2'    Current HEP:  Access Code: [email or web address]  URL: [email or web address]  Date: 10/18/2024  Prepared by: Raymond Huddleston    Exercises  - Seated Hamstring Stretch  - 1 x daily - 7 x weekly - 1 sets - 3 reps - 30 second hold  - Seated Transversus Abdominis Bracing  - 1 x daily - 7 x weekly - 2 sets - 10 reps - 3 second hold  - Seated Hip Adduction Squeeze with Ball  - 1 x daily - 7 x weekly - 2 sets - 10 reps  - Seated Hip Abduction with " Resistance  - 1 x daily - 7 x weekly - 2 sets - 10 reps  - Seated March  - 1 x daily - 7 x weekly - 2 sets - 10 reps    Assessment:  Pt's response to treatment: Pt with significantly reduced pain this week. Pt less guarded with mobility but still doing well avoiding excessive twisting. Pain down at exit.   Areas of improvements: pain relief   Limitations/deficits: posture     Pain end of session: 2/10    Plan:  OP PT Plan  Treatment/Interventions: Aquatic therapy, Dry needling, Electrical stimulation, Manual therapy, Neuromuscular re-education, Taping techniques, Therapeutic activities, Therapeutic exercises, Ultrasound  PT Plan: Skilled PT  PT Frequency: 2 times per week  Duration: 10 visits  Certification Period Start Date: 10/18/24  Certification Period End Date: 01/16/25  Number of Treatments Authorized: MN  Rehab Potential: Good  Plan of Care Agreement: Patient  Continue with current POC/no changes    Assessment of current progress against goals:  Progressing toward functional goals    Goals:  Active       PT Problem       STG, 5 visits       Start:  10/18/24    Expected End:  12/02/24       Pt will be independent in HEP to improve LE and core strength, mobility, and function.  Pt will report 50% reduction in pain with functional mobility such as standing, walking, and stairs.         Pt will increase strength in core and LEs by 1/2 MMT in all planes for improved performance of functional mobility.         Start:  10/18/24    Expected End:  01/16/25            Pt will demonstrate no more than 25% limitation in lumbar AROM without pain in all planes for improved performance of ADLs.         Start:  10/18/24    Expected End:  01/16/25            Pt will ambulate moderate community distances across all surfaces without deviation and without pain.        Start:  10/18/24    Expected End:  01/16/25            Pt will stand for >30 minutes with UE support PRN without increase in pain for improved tolerance to  household tasks such as cooking.         Start:  10/18/24    Expected End:  01/16/25            Pt will perform 5x sit to  11s without pain for improved functional strength and mobility.        Start:  10/18/24    Expected End:  01/16/25

## 2024-12-12 NOTE — PROGRESS NOTES
Subjective   Patient ID: Dyana Irene is a 69 y.o. female who presents for Establish Care.    HPI   Pt here to establish.   Pmh sig for htn, cad, h/o of dvt and PE, hyperglycemia, HLD, zeb.    She sees cardio for her htn, mvr and HLD.   She also has chronic back  pain and sees PM: Vucetic.    She also sees ENT for her chronic PND/cough.      Past Medical History:   Diagnosis Date    Abnormal electrocardiography 12/30/2022    Allergic rhinitis 02/13/2024    [facility]    Anxiety 09/06/2023    Aortic valve sclerosis 09/06/2023    Arthropathy of hand 02/13/2024    Atrophic emphysema (Multi) 09/06/2023    Benign essential hypertension 09/06/2023    Bilateral carotid artery stenosis 10/24/2022    Chronic bilateral low back pain with bilateral sciatica 11/06/2023    Cigarette smoker 02/13/2024    Coronary artery disease involving native coronary artery without angina pectoris 09/06/2023    COVID-19 11/04/2021    Depressive disorder 02/13/2024    Disc degeneration, lumbar 09/06/2023    Displaced fracture of proximal phalanx of unspecified finger, initial encounter for closed fracture 11/25/2019    Diverticulosis 09/06/2023    Elevated coronary artery calcium score 09/06/2023    CT Coronary artery calcium score (6/17): 736      Enlarged liver 09/06/2023    Fibromyalgia 09/06/2023    Hearing loss 02/13/2024    History of deep vein thrombosis 09/06/2023    History of depression 02/13/2024    Impaired fasting glucose 09/06/2023    Insomnia 09/06/2023    Kidney cyst, acquired 09/06/2023    Lumbosacral spondylosis without myelopathy 09/06/2023    Lung nodules 09/06/2023    Mitral valve regurgitation 09/06/2023    Mitral valve regurgitation:  mild-mod (4/22), mild (4/24)  LVEF 60-65% (4/24)      Mixed hyperlipidemia 09/06/2023    Mixed stress and urge urinary incontinence 09/06/2023    Neuralgia and neuritis, unspecified 02/07/2017    Neuralgia    Non-pressure chronic ulcer of skin of other sites limited to breakdown of skin  02/04/2019    Obstructive sleep apnea 09/06/2023    Osteoarthritis of right knee 02/13/2024    Other chronic pain 10/18/2024    [facility]    Other microscopic hematuria 07/20/2018    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    Pelvic and perineal pain 07/25/2019    Persistent cough 02/13/2024    [facility]    Personal history of other diseases of the digestive system 09/27/2016    History of diverticulitis of colon    Personal history of other endocrine, nutritional and metabolic disease 02/04/2019    History of vitamin D deficiency    Personal history of pulmonary embolism 04/06/2022    Pes anserinus bursitis of right knee 02/13/2024    Phlebitis and thrombophlebitis of unspecified site 05/21/2016    Superficial thrombophlebitis    Prediabetes 12/31/2024    Primary osteoarthritis, unspecified hand 04/26/2021    Arthritis of carpometacarpal joint    Sensorineural hearing loss, bilateral 01/19/2023    Severe obesity (Multi) 02/13/2024    Solitary pulmonary nodule 05/10/2021    Spinal stenosis, lumbar region, with neurogenic claudication 06/25/2021    Squamous cell carcinoma of skin of lower limb, including hip 04/12/2023    Tobacco dependence due to cigarettes 02/13/2024    Urethral disorder, unspecified 07/20/2018    Zoster without complications 02/04/2019     Current Outpatient Medications   Medication Sig Dispense Refill    ascorbic acid (Vitamin C) 1,000 mg tablet Take 1 tablet (1,000 mg) by mouth once daily.      aspirin 81 mg EC tablet 1 tablet Orally Once a day      atorvastatin (Lipitor) 80 mg tablet TAKE 1 TABLET BY MOUTH EVERY DAY 90 tablet 0    chlorthalidone (Hygroton) 25 mg tablet TAKE 1 TABLET BY MOUTH EVERY DAY 90 tablet 1    cholecalciferol (Vitamin D-3) 50 MCG (2000 UT) tablet 1 tablet Orally Once a day      DULoxetine (Cymbalta) 60 mg DR capsule TAKE 1 CAPSULE BY MOUTH EVERY DAY 90 capsule 0    ezetimibe (Zetia) 10 mg tablet TAKE 1 TABLET BY MOUTH EVERY DAY 90 tablet 3    losartan (Cozaar)  "100 mg tablet TAKE 1 TABLET BY MOUTH EVERY DAY 90 tablet 1    metoprolol succinate XL (Toprol-XL) 25 mg 24 hr tablet TAKE 1 TABLET BY MOUTH EVERY DAY 90 tablet 3    MULTIVITAMIN ORAL 1 tablet Orally Once a day      multivitamin-min-iron-FA-vit K (Adults Multivitamin) 18 mg iron-400 mcg-25 mcg tablet TAKE 1 TABLET DAILY.      potassium chloride CR 20 mEq ER tablet TAKE 1 TABLET BY MOUTH EVERY DAY 90 tablet 1    topiramate (Topamax) 100 mg tablet 1 tablet Orally every 12 hours      traMADol (Ultram) 50 mg tablet Take 1 tablet (50 mg) by mouth.      DULoxetine (Cymbalta) 30 mg DR capsule Take 1 capsule (30 mg) by mouth once daily. Do not crush or chew. Take with 60 mg cymbalta at home 30 capsule 0    varenicline (Chantix KARIN) 0.5 mg (11)- 1 mg (42) tablet Use as directed on package instructions, try to quit smoking after 1 week. 1 each 0     No current facility-administered medications for this visit.       Review of Systems see hpi  Labs:  Lab Results   Component Value Date    GLUCOSE 108 (H) 02/24/2024    CALCIUM 10.1 02/24/2024     02/24/2024    K 3.9 02/24/2024    CO2 32 02/24/2024     02/24/2024    BUN 17 02/24/2024    CREATININE 1.00 02/24/2024     Lab Results   Component Value Date    CHOL 169 02/24/2024    CHOL 167 09/02/2023    CHOL 182 04/22/2023     Lab Results   Component Value Date    HDL 50.1 02/24/2024    HDL 41.5 09/02/2023    HDL 45.1 04/22/2023     Lab Results   Component Value Date    LDLCALC 86 02/24/2024    LDLCALC 81 10/25/2022     Lab Results   Component Value Date    TRIG 164 (H) 02/24/2024    TRIG 174 (H) 09/02/2023    TRIG 185 (H) 04/22/2023     No components found for: \"CHOLHDL\"    Objective   /72   Pulse 102   Ht 1.638 m (5' 4.5\")   Wt 117 kg (259 lb)   SpO2 100%   BMI 43.77 kg/m²     Physical Exam  Vitals reviewed.   Constitutional:       Appearance: Normal appearance.   HENT:      Head: Normocephalic and atraumatic.   Cardiovascular:      Rate and Rhythm: Normal rate " and regular rhythm.      Heart sounds: Normal heart sounds.   Pulmonary:      Effort: Pulmonary effort is normal.      Breath sounds: Normal breath sounds.   Neurological:      Mental Status: She is alert.      Cranial Nerves: Cranial nerves 2-12 are intact.      Motor: Motor function is intact.      Coordination: Coordination is intact.      Deep Tendon Reflexes: Reflexes are normal and symmetric.         Assessment/Plan   Assessment & Plan  Prediabetes    Orders:    POCT glycosylated hemoglobin (Hb A1C) manually resulted    Depression with anxiety  Combine with 60 mg at home; call with update in a month. If no better, can add wellbutrin.  Orders:    DULoxetine (Cymbalta) 30 mg DR capsule; Take 1 capsule (30 mg) by mouth once daily. Do not crush or chew. Take with 60 mg cymbalta at home    Benign essential hypertension  Cont meds per cardio; cont chlorthalidone,losartin, metoprolol, potassium  Orders:    Albumin-Creatinine Ratio, Urine Random; Future    Urinalysis with Reflex Microscopic; Future    Comprehensive Metabolic Panel; Future    Mixed hyperlipidemia  Cont meds per cardio: atorvastatin/zetia  Orders:    Lipid Panel; Future    Comprehensive Metabolic Panel; Future    Coronary artery disease involving native coronary artery without angina pectoris, unspecified whether native or transplanted heart  Cont  meds per cardio       Persistent cough    Orders:    Referral to ENT; Future    Post-nasal drip    Orders:    Referral to ENT; Future    Body mass index (BMI) 40.0-44.9, adult (Multi)         Encounter for screening mammogram for malignant neoplasm of breast    Orders:    BI mammo bilateral screening tomosynthesis; Future

## 2024-12-13 ENCOUNTER — TREATMENT (OUTPATIENT)
Dept: PHYSICAL THERAPY | Facility: CLINIC | Age: 69
End: 2024-12-13
Payer: MEDICARE

## 2024-12-13 DIAGNOSIS — G89.29 OTHER CHRONIC PAIN: ICD-10-CM

## 2024-12-13 DIAGNOSIS — M48.062 SPINAL STENOSIS, LUMBAR REGION, WITH NEUROGENIC CLAUDICATION: ICD-10-CM

## 2024-12-13 DIAGNOSIS — M47.817 LUMBOSACRAL SPONDYLOSIS WITHOUT MYELOPATHY: ICD-10-CM

## 2024-12-13 PROCEDURE — 97113 AQUATIC THERAPY/EXERCISES: CPT | Mod: GP

## 2024-12-13 ASSESSMENT — PAIN SCALES - GENERAL: PAINLEVEL_OUTOF10: 4

## 2024-12-13 ASSESSMENT — PAIN - FUNCTIONAL ASSESSMENT: PAIN_FUNCTIONAL_ASSESSMENT: 0-10

## 2024-12-16 NOTE — PROGRESS NOTES
"    Physical Therapy Treatment    Patient Name: Dyana Irene  MRN: [id number]  Encounter date:  12/17/2024             Visit Number:  7 (including evaluation)  Planned total visits:    Visits Authorized/Insurance Coverage:  MEDICARE A/B, MMO SUPP, [place], NO AUTH     Current Problem  Problem List Items Addressed This Visit    None          Surgery  MILD 10/30    Precautions       Pain       Subjective  ***    Objective  ***    Treatment:     Walk x3 laps fwd, bwd, s/s across pool    Hamstring curl 2x30\" ea     At rail, 4' depth, cues for TrA brace, x15 ea:  Heel raise  Hip abduction  Hip extension  Hip flexion/SLR  Hamstring curl  March in place    Deep, 5' with noodle under arms, BUE support:  Decompression x2'  Hip abd/adduction x2'  XC ski x2'  Bike x2'  Decompression x2'    Current HEP:  Access Code: [email or web address]  URL: [email or web address]  Date: 10/18/2024  Prepared by: Raymond Huddleston    Exercises  - Seated Hamstring Stretch  - 1 x daily - 7 x weekly - 1 sets - 3 reps - 30 second hold  - Seated Transversus Abdominis Bracing  - 1 x daily - 7 x weekly - 2 sets - 10 reps - 3 second hold  - Seated Hip Adduction Squeeze with Ball  - 1 x daily - 7 x weekly - 2 sets - 10 reps  - Seated Hip Abduction with Resistance  - 1 x daily - 7 x weekly - 2 sets - 10 reps  - Seated March  - 1 x daily - 7 x weekly - 2 sets - 10 reps    Assessment:  Pt's response to treatment: ***  Areas of improvements: ***  Limitations/deficits: ***    Pain end of session: ***/10    Plan:     Continue with current POC/no changes    Assessment of current progress against goals:  Progressing toward functional goals    Goals:  Active       PT Problem       STG, 5 visits       Start:  10/18/24    Expected End:  12/02/24       Pt will be independent in HEP to improve LE and core strength, mobility, and function.  Pt will report 50% reduction in pain with functional mobility such as standing, walking, and stairs.         Pt will increase strength in " core and LEs by 1/2 MMT in all planes for improved performance of functional mobility.         Start:  10/18/24    Expected End:  01/16/25            Pt will demonstrate no more than 25% limitation in lumbar AROM without pain in all planes for improved performance of ADLs.         Start:  10/18/24    Expected End:  01/16/25            Pt will ambulate moderate community distances across all surfaces without deviation and without pain.        Start:  10/18/24    Expected End:  01/16/25            Pt will stand for >30 minutes with UE support PRN without increase in pain for improved tolerance to household tasks such as cooking.         Start:  10/18/24    Expected End:  01/16/25            Pt will perform 5x sit to  11s without pain for improved functional strength and mobility.        Start:  10/18/24    Expected End:  01/16/25

## 2024-12-17 ENCOUNTER — APPOINTMENT (OUTPATIENT)
Dept: PHYSICAL THERAPY | Facility: CLINIC | Age: 69
End: 2024-12-17
Payer: MEDICARE

## 2024-12-18 ENCOUNTER — APPOINTMENT (OUTPATIENT)
Dept: PAIN MEDICINE | Facility: CLINIC | Age: 69
End: 2024-12-18
Payer: MEDICARE

## 2024-12-18 NOTE — PROGRESS NOTES
"    Physical Therapy Treatment    Patient Name: Dyana Irene  MRN: [id number]  Encounter date:  12/19/2024  Time Calculation  Start Time: 0900  Stop Time: 0944  Time Calculation (min): 44 min     PT Therapeutic Procedures Time Entry  Aquatic Therapy Time Entry: 44    Visit Number:  7 (including evaluation)  Planned total visits: 10 visits  Visits Authorized/Insurance Coverage:  MEDICARE A/B, MMO SUPP, [place], NO AUTH     Current Problem  Problem List Items Addressed This Visit             ICD-10-CM    Spinal stenosis, lumbar region, with neurogenic claudication M48.062    Lumbosacral spondylosis without myelopathy M47.817    Other chronic pain G89.29     Surgery  MILD 10/30    Precautions  Precautions  Precautions Comment: None    Pain  Pain Assessment: 0-10  0-10 (Numeric) Pain Score: 6    Subjective  Pt notes feeling good for a few days after last visit, then being more active and having increase in pain. She is frustrated by lack of activity tolerance and up and down pain levels.     Objective  Flexed trunk posture in pool and on pool deck    Treatment:  Aquatic Exercise  Aquatic Exercise Performed: Yes  Walk x3 laps fwd, bwd, s/s across pool    Hamstring stretch at stairs  2x30\" ea   Lumbar flexion stretch with noodle seated x15 5\" hold     At rail, 4' depth, cues for TrA brace, x15 ea:  Heel raise  Hip abduction  Hip extension  Hip flexion/SLR  Hamstring curl  March in place    Deep, 5' with noodle under arms, BUE support:  Decompression x2'  Hip abd/adduction x2'  XC ski x2'  Bike x2'  Decompression x2'    Current HEP:  Access Code: [email or web address]  URL: [email or web address]  Date: 10/18/2024  Prepared by: Raymond Huddleston    Exercises  - Seated Hamstring Stretch  - 1 x daily - 7 x weekly - 1 sets - 3 reps - 30 second hold  - Seated Transversus Abdominis Bracing  - 1 x daily - 7 x weekly - 2 sets - 10 reps - 3 second hold  - Seated Hip Adduction Squeeze with Ball  - 1 x daily - 7 x weekly - 2 sets - 10 reps  - " Seated Hip Abduction with Resistance  - 1 x daily - 7 x weekly - 2 sets - 10 reps  - Seated March  - 1 x daily - 7 x weekly - 2 sets - 10 reps    Assessment:  Pt's response to treatment: Pt somewhat guarded in pool today but did get relief from stretching and deep end decompression. Pt encouraged to continue stretching and activity pacing at home to avoid exacerbations. Despite pain, quality of mobility in sessions continues to get better.   Areas of improvements: symptom relief in pool  Limitations/deficits: posture, activity tolerance    Pain end of session: 4/10    Plan:  OP PT Plan  Treatment/Interventions: Aquatic therapy, Dry needling, Electrical stimulation, Manual therapy, Neuromuscular re-education, Taping techniques, Therapeutic activities, Therapeutic exercises, Ultrasound  PT Plan: Skilled PT  PT Frequency: 2 times per week  Duration: 10 visits  Certification Period Start Date: 10/18/24  Certification Period End Date: 01/16/25  Number of Treatments Authorized: [place]  Rehab Potential: Good  Plan of Care Agreement: Patient  Continue with current POC/no changes    Assessment of current progress against goals:  Progressing toward functional goals    Goals:  Active       PT Problem       STG, 5 visits       Start:  10/18/24    Expected End:  12/02/24       Pt will be independent in HEP to improve LE and core strength, mobility, and function.  Pt will report 50% reduction in pain with functional mobility such as standing, walking, and stairs.         Pt will increase strength in core and LEs by 1/2 MMT in all planes for improved performance of functional mobility.         Start:  10/18/24    Expected End:  01/16/25            Pt will demonstrate no more than 25% limitation in lumbar AROM without pain in all planes for improved performance of ADLs.         Start:  10/18/24    Expected End:  01/16/25            Pt will ambulate moderate community distances across all surfaces without deviation and without pain.         Start:  10/18/24    Expected End:  01/16/25            Pt will stand for >30 minutes with UE support PRN without increase in pain for improved tolerance to household tasks such as cooking.         Start:  10/18/24    Expected End:  01/16/25            Pt will perform 5x sit to  11s without pain for improved functional strength and mobility.        Start:  10/18/24    Expected End:  01/16/25

## 2024-12-19 ENCOUNTER — APPOINTMENT (OUTPATIENT)
Dept: PHYSICAL THERAPY | Facility: CLINIC | Age: 69
End: 2024-12-19
Payer: MEDICARE

## 2024-12-19 DIAGNOSIS — M47.817 LUMBOSACRAL SPONDYLOSIS WITHOUT MYELOPATHY: ICD-10-CM

## 2024-12-19 DIAGNOSIS — G89.29 OTHER CHRONIC PAIN: ICD-10-CM

## 2024-12-19 DIAGNOSIS — M48.062 SPINAL STENOSIS, LUMBAR REGION, WITH NEUROGENIC CLAUDICATION: ICD-10-CM

## 2024-12-19 PROCEDURE — 97113 AQUATIC THERAPY/EXERCISES: CPT | Mod: GP

## 2024-12-19 ASSESSMENT — PAIN - FUNCTIONAL ASSESSMENT: PAIN_FUNCTIONAL_ASSESSMENT: 0-10

## 2024-12-19 ASSESSMENT — PAIN SCALES - GENERAL: PAINLEVEL_OUTOF10: 6

## 2024-12-22 DIAGNOSIS — I10 ESSENTIAL (PRIMARY) HYPERTENSION: ICD-10-CM

## 2024-12-23 RX ORDER — METOPROLOL SUCCINATE 25 MG/1
25 TABLET, EXTENDED RELEASE ORAL DAILY
Qty: 90 TABLET | Refills: 3 | Status: SHIPPED | OUTPATIENT
Start: 2024-12-23

## 2024-12-27 ENCOUNTER — OFFICE VISIT (OUTPATIENT)
Dept: PAIN MEDICINE | Facility: CLINIC | Age: 69
End: 2024-12-27
Payer: MEDICARE

## 2024-12-27 VITALS
HEIGHT: 66 IN | WEIGHT: 247 LBS | HEART RATE: 89 BPM | DIASTOLIC BLOOD PRESSURE: 62 MMHG | BODY MASS INDEX: 39.7 KG/M2 | OXYGEN SATURATION: 95 % | RESPIRATION RATE: 18 BRPM | SYSTOLIC BLOOD PRESSURE: 127 MMHG

## 2024-12-27 DIAGNOSIS — M48.062 SPINAL STENOSIS, LUMBAR REGION, WITH NEUROGENIC CLAUDICATION: Primary | ICD-10-CM

## 2024-12-27 DIAGNOSIS — F32.A DEPRESSION, UNSPECIFIED DEPRESSION TYPE: ICD-10-CM

## 2024-12-27 PROCEDURE — G2211 COMPLEX E/M VISIT ADD ON: HCPCS | Performed by: PHYSICIAN ASSISTANT

## 2024-12-27 PROCEDURE — 1125F AMNT PAIN NOTED PAIN PRSNT: CPT | Performed by: PHYSICIAN ASSISTANT

## 2024-12-27 PROCEDURE — 1157F ADVNC CARE PLAN IN RCRD: CPT | Performed by: PHYSICIAN ASSISTANT

## 2024-12-27 PROCEDURE — 3074F SYST BP LT 130 MM HG: CPT | Performed by: PHYSICIAN ASSISTANT

## 2024-12-27 PROCEDURE — 99214 OFFICE O/P EST MOD 30 MIN: CPT | Performed by: PHYSICIAN ASSISTANT

## 2024-12-27 PROCEDURE — 1160F RVW MEDS BY RX/DR IN RCRD: CPT | Performed by: PHYSICIAN ASSISTANT

## 2024-12-27 PROCEDURE — 3008F BODY MASS INDEX DOCD: CPT | Performed by: PHYSICIAN ASSISTANT

## 2024-12-27 PROCEDURE — 3078F DIAST BP <80 MM HG: CPT | Performed by: PHYSICIAN ASSISTANT

## 2024-12-27 PROCEDURE — 1159F MED LIST DOCD IN RCRD: CPT | Performed by: PHYSICIAN ASSISTANT

## 2024-12-27 RX ORDER — TRAMADOL HYDROCHLORIDE 50 MG/1
50 TABLET ORAL
COMMUNITY

## 2024-12-27 RX ORDER — TRAMADOL HYDROCHLORIDE 50 MG/1
50 TABLET ORAL EVERY 8 HOURS PRN
Qty: 90 TABLET | Refills: 2 | Status: CANCELLED | OUTPATIENT
Start: 2024-12-27 | End: 2025-03-27

## 2024-12-27 ASSESSMENT — ENCOUNTER SYMPTOMS
GASTROINTESTINAL NEGATIVE: 1
RESPIRATORY NEGATIVE: 1
EYES NEGATIVE: 1
PSYCHIATRIC NEGATIVE: 1
PAIN: 1
NEUROLOGICAL NEGATIVE: 1
HEMATOLOGIC/LYMPHATIC NEGATIVE: 1
CONSTITUTIONAL NEGATIVE: 1
ENDOCRINE NEGATIVE: 1
ALLERGIC/IMMUNOLOGIC NEGATIVE: 1
BACK PAIN: 1
CARDIOVASCULAR NEGATIVE: 1

## 2024-12-27 ASSESSMENT — PATIENT HEALTH QUESTIONNAIRE - PHQ9
SUM OF ALL RESPONSES TO PHQ9 QUESTIONS 1 & 2: 0
1. LITTLE INTEREST OR PLEASURE IN DOING THINGS: NOT AT ALL
2. FEELING DOWN, DEPRESSED OR HOPELESS: NOT AT ALL

## 2024-12-27 ASSESSMENT — LIFESTYLE VARIABLES
HOW MANY STANDARD DRINKS CONTAINING ALCOHOL DO YOU HAVE ON A TYPICAL DAY: PATIENT DOES NOT DRINK
AUDIT-C TOTAL SCORE: 0
HOW OFTEN DO YOU HAVE SIX OR MORE DRINKS ON ONE OCCASION: NEVER
SKIP TO QUESTIONS 9-10: 1
HOW OFTEN DO YOU HAVE A DRINK CONTAINING ALCOHOL: NEVER

## 2024-12-27 ASSESSMENT — PAIN SCALES - GENERAL: PAINLEVEL_OUTOF10: 5

## 2024-12-27 NOTE — PROGRESS NOTES
Subjective   Patient ID: Dyana Irene is a 69 y.o. female who presents for Pain.  Is a 69-year-old female with spinal stenosis neurogenic claudication and history of depression the presents today for follow-up.  Patient is afraid that the tramadol's been causing increased depression.  Patient did notes that in September she had some changes in her life #1 she had moved into a new home #2 patient then had an increase in medications.  In October she had a surgical procedure that did provide some relief of her symptoms but she is having variable symptoms.  She is not currently in aquatic therapy.  She did notes that this is variable in its effectiveness of helping with her symptoms.  Patient still continues to have variable back and lower extremity symptoms.  Patient sleeps in a recliner.  Patient has also been noticing issues with sleep.  Patient did notes that she is having lack of interest lack of feeling like herself and not participating in normal activities such as sewing that she normally likes.  She also reports that in July she did have a loss of a dog.  And she is lost her  about 14 years ago.  Patient does do note that when she is around family and friends she tends to feel better.  She denies any thoughts of suicide or harm or intrusive thoughts    Pain        Review of Systems   Constitutional: Negative.    HENT: Negative.     Eyes: Negative.    Respiratory: Negative.     Cardiovascular: Negative.    Gastrointestinal: Negative.    Endocrine: Negative.    Genitourinary: Negative.    Musculoskeletal:  Positive for back pain.   Skin: Negative.    Allergic/Immunologic: Negative.    Neurological: Negative.    Hematological: Negative.    Psychiatric/Behavioral: Negative.         Objective   Physical Exam  Vitals and nursing note reviewed.   Constitutional:       Appearance: Normal appearance.   HENT:      Head: Normocephalic and atraumatic.      Right Ear: Ear canal and external ear normal.      Left  Ear: Ear canal and external ear normal.      Nose: Nose normal.      Mouth/Throat:      Mouth: Mucous membranes are moist.      Pharynx: Oropharynx is clear.   Eyes:      Conjunctiva/sclera: Conjunctivae normal.      Pupils: Pupils are equal, round, and reactive to light.   Cardiovascular:      Rate and Rhythm: Normal rate.   Pulmonary:      Effort: Pulmonary effort is normal. No respiratory distress.   Musculoskeletal:      Cervical back: Normal range of motion and neck supple.      Lumbar back: Tenderness present. Decreased range of motion. Negative right straight leg raise test and negative left straight leg raise test. No scoliosis.        Back:    Skin:     General: Skin is warm and dry.   Neurological:      Mental Status: She is alert and oriented to person, place, and time.      Sensory: Sensation is intact.      Motor: Motor function is intact.      Coordination: Coordination abnormal.      Gait: Gait abnormal.      Comments: Patient walks with lumbar flexed position.  Patient can stand from seated position unassisted.   Psychiatric:         Mood and Affect: Mood normal.         Thought Content: Thought content normal.         Assessment/Plan   Problem List Items Addressed This Visit             ICD-10-CM    Spinal stenosis, lumbar region, with neurogenic claudication - Primary M48.062     Other Visit Diagnoses         Codes    Depression, unspecified depression type     F32.A          Patient is having an appointment with her primary care in the 31st.  Patient is taking Cymbalta and tramadol for therapeutic use.  At this point I am going to press pause we have talked about the utilization of the medication patient can use half tablets to full tablets up to 4 a day.  We talked about potentially using less at night as it may be causing some sleep issues.  I think patient should work with her primary care on new potential psychological agents.  I did recommend patient psychology today and provided information  on how she can set up virtual health appointments.  Will have patient set up a telehealth appointment with me in about 2 weeks to see what the plan of care is with her primary care.  Will see how she is doing with the alteration of the use of her medications.  Would like patient to continue physical therapy she has not been in since 1 week due to the holidays.  I have told her the rationale of changing too many things at once could be problematic.  I do not believe patient has any risk of self-harm.  We also talked about potentially experiencing new crafts or projects creating new bonds by doing some volunteer helper potentially fostering or getting a new rescue animal.       Kelvin Sanabria PA-C 12/27/24 10:14 AM

## 2024-12-27 NOTE — PROGRESS NOTES
MEDICATION NAME: Tramadol  STRENGTH: 50mg  LAST FILL DATE: 24  DATE LAST TAKEN: 24  QUANTITY FILLED: 120  QUANTITY REMAININ  COUNT COMPLETED BY: JOMAR MAK LPN and MARY ELLEN TUCKER      UDS LAST COMPLETED:   CONTROLLED SUBSTANCES AGREEMENT LAST SIGNED:   ORT LAST COMPLETED:  Modified Oswestry disability form filled out annually.

## 2024-12-31 ENCOUNTER — LAB (OUTPATIENT)
Dept: LAB | Facility: LAB | Age: 69
End: 2024-12-31
Payer: MEDICARE

## 2024-12-31 ENCOUNTER — OFFICE VISIT (OUTPATIENT)
Dept: PRIMARY CARE | Facility: CLINIC | Age: 69
End: 2024-12-31
Payer: MEDICARE

## 2024-12-31 VITALS
OXYGEN SATURATION: 100 % | SYSTOLIC BLOOD PRESSURE: 124 MMHG | DIASTOLIC BLOOD PRESSURE: 72 MMHG | HEART RATE: 102 BPM | BODY MASS INDEX: 43.15 KG/M2 | WEIGHT: 259 LBS | HEIGHT: 65 IN

## 2024-12-31 DIAGNOSIS — E78.2 MIXED HYPERLIPIDEMIA: ICD-10-CM

## 2024-12-31 DIAGNOSIS — R05.3 PERSISTENT COUGH: ICD-10-CM

## 2024-12-31 DIAGNOSIS — R73.03 PREDIABETES: Primary | ICD-10-CM

## 2024-12-31 DIAGNOSIS — I25.10 CORONARY ARTERY DISEASE INVOLVING NATIVE CORONARY ARTERY WITHOUT ANGINA PECTORIS, UNSPECIFIED WHETHER NATIVE OR TRANSPLANTED HEART: ICD-10-CM

## 2024-12-31 DIAGNOSIS — R09.82 POST-NASAL DRIP: ICD-10-CM

## 2024-12-31 DIAGNOSIS — Z12.31 ENCOUNTER FOR SCREENING MAMMOGRAM FOR MALIGNANT NEOPLASM OF BREAST: ICD-10-CM

## 2024-12-31 DIAGNOSIS — F41.8 DEPRESSION WITH ANXIETY: ICD-10-CM

## 2024-12-31 DIAGNOSIS — I10 BENIGN ESSENTIAL HYPERTENSION: ICD-10-CM

## 2024-12-31 LAB
ALBUMIN SERPL BCP-MCNC: 4 G/DL (ref 3.4–5)
ALP SERPL-CCNC: 71 U/L (ref 33–136)
ALT SERPL W P-5'-P-CCNC: 18 U/L (ref 7–45)
ANION GAP SERPL CALC-SCNC: 10 MMOL/L (ref 10–20)
AST SERPL W P-5'-P-CCNC: 20 U/L (ref 9–39)
BILIRUB SERPL-MCNC: 0.6 MG/DL (ref 0–1.2)
BUN SERPL-MCNC: 19 MG/DL (ref 6–23)
CALCIUM SERPL-MCNC: 9.5 MG/DL (ref 8.6–10.3)
CHLORIDE SERPL-SCNC: 103 MMOL/L (ref 98–107)
CHOLEST SERPL-MCNC: 168 MG/DL (ref 0–199)
CHOLESTEROL/HDL RATIO: 3.3
CO2 SERPL-SCNC: 28 MMOL/L (ref 21–32)
CREAT SERPL-MCNC: 0.89 MG/DL (ref 0.5–1.05)
EGFRCR SERPLBLD CKD-EPI 2021: 70 ML/MIN/1.73M*2
GLUCOSE SERPL-MCNC: 93 MG/DL (ref 74–99)
HDLC SERPL-MCNC: 50.3 MG/DL
LDLC SERPL CALC-MCNC: 85 MG/DL
NON HDL CHOLESTEROL: 118 MG/DL (ref 0–149)
POC HEMOGLOBIN A1C: 5.9 % (ref 4.2–6.5)
POTASSIUM SERPL-SCNC: 3.4 MMOL/L (ref 3.5–5.3)
PROT SERPL-MCNC: 7.5 G/DL (ref 6.4–8.2)
SODIUM SERPL-SCNC: 138 MMOL/L (ref 136–145)
TRIGL SERPL-MCNC: 166 MG/DL (ref 0–149)
VLDL: 33 MG/DL (ref 0–40)

## 2024-12-31 PROCEDURE — 1159F MED LIST DOCD IN RCRD: CPT | Performed by: FAMILY MEDICINE

## 2024-12-31 PROCEDURE — 83036 HEMOGLOBIN GLYCOSYLATED A1C: CPT | Performed by: FAMILY MEDICINE

## 2024-12-31 PROCEDURE — 3008F BODY MASS INDEX DOCD: CPT | Performed by: FAMILY MEDICINE

## 2024-12-31 PROCEDURE — 1157F ADVNC CARE PLAN IN RCRD: CPT | Performed by: FAMILY MEDICINE

## 2024-12-31 PROCEDURE — 1123F ACP DISCUSS/DSCN MKR DOCD: CPT | Performed by: FAMILY MEDICINE

## 2024-12-31 PROCEDURE — 3078F DIAST BP <80 MM HG: CPT | Performed by: FAMILY MEDICINE

## 2024-12-31 PROCEDURE — 99203 OFFICE O/P NEW LOW 30 MIN: CPT | Performed by: FAMILY MEDICINE

## 2024-12-31 PROCEDURE — 1126F AMNT PAIN NOTED NONE PRSNT: CPT | Performed by: FAMILY MEDICINE

## 2024-12-31 PROCEDURE — 99213 OFFICE O/P EST LOW 20 MIN: CPT | Performed by: FAMILY MEDICINE

## 2024-12-31 PROCEDURE — 3074F SYST BP LT 130 MM HG: CPT | Performed by: FAMILY MEDICINE

## 2024-12-31 PROCEDURE — 1160F RVW MEDS BY RX/DR IN RCRD: CPT | Performed by: FAMILY MEDICINE

## 2024-12-31 PROCEDURE — 1158F ADVNC CARE PLAN TLK DOCD: CPT | Performed by: FAMILY MEDICINE

## 2024-12-31 RX ORDER — DULOXETIN HYDROCHLORIDE 30 MG/1
30 CAPSULE, DELAYED RELEASE ORAL DAILY
Qty: 30 CAPSULE | Refills: 0 | Status: SHIPPED | OUTPATIENT
Start: 2024-12-31 | End: 2025-01-30

## 2024-12-31 ASSESSMENT — PATIENT HEALTH QUESTIONNAIRE - PHQ9
SUM OF ALL RESPONSES TO PHQ9 QUESTIONS 1 AND 2: 4
3. TROUBLE FALLING OR STAYING ASLEEP OR SLEEPING TOO MUCH: SEVERAL DAYS
1. LITTLE INTEREST OR PLEASURE IN DOING THINGS: NEARLY EVERY DAY
10. IF YOU CHECKED OFF ANY PROBLEMS, HOW DIFFICULT HAVE THESE PROBLEMS MADE IT FOR YOU TO DO YOUR WORK, TAKE CARE OF THINGS AT HOME, OR GET ALONG WITH OTHER PEOPLE: SOMEWHAT DIFFICULT
2. FEELING DOWN, DEPRESSED OR HOPELESS: SEVERAL DAYS
SUM OF ALL RESPONSES TO PHQ QUESTIONS 1-9: 9
6. FEELING BAD ABOUT YOURSELF - OR THAT YOU ARE A FAILURE OR HAVE LET YOURSELF OR YOUR FAMILY DOWN: SEVERAL DAYS
4. FEELING TIRED OR HAVING LITTLE ENERGY: SEVERAL DAYS
5. POOR APPETITE OR OVEREATING: SEVERAL DAYS
9. THOUGHTS THAT YOU WOULD BE BETTER OFF DEAD, OR OF HURTING YOURSELF: NOT AT ALL
8. MOVING OR SPEAKING SO SLOWLY THAT OTHER PEOPLE COULD HAVE NOTICED. OR THE OPPOSITE, BEING SO FIGETY OR RESTLESS THAT YOU HAVE BEEN MOVING AROUND A LOT MORE THAN USUAL: NOT AT ALL
7. TROUBLE CONCENTRATING ON THINGS, SUCH AS READING THE NEWSPAPER OR WATCHING TELEVISION: SEVERAL DAYS

## 2024-12-31 ASSESSMENT — PAIN SCALES - GENERAL: PAINLEVEL_OUTOF10: 0-NO PAIN

## 2024-12-31 NOTE — ASSESSMENT & PLAN NOTE
Cont meds per cardio; cont chlorthalidone,losartin, metoprolol, potassium  Orders:    Albumin-Creatinine Ratio, Urine Random; Future    Urinalysis with Reflex Microscopic; Future    Comprehensive Metabolic Panel; Future

## 2024-12-31 NOTE — ASSESSMENT & PLAN NOTE
Cont meds per cardio: atorvastatin/zetia  Orders:    Lipid Panel; Future    Comprehensive Metabolic Panel; Future

## 2025-01-06 ENCOUNTER — APPOINTMENT (OUTPATIENT)
Dept: PHYSICAL THERAPY | Facility: CLINIC | Age: 70
End: 2025-01-06
Payer: MEDICARE

## 2025-01-08 ENCOUNTER — APPOINTMENT (OUTPATIENT)
Dept: PHYSICAL THERAPY | Facility: CLINIC | Age: 70
End: 2025-01-08
Payer: MEDICARE

## 2025-01-09 ENCOUNTER — APPOINTMENT (OUTPATIENT)
Dept: PAIN MEDICINE | Facility: CLINIC | Age: 70
End: 2025-01-09
Payer: MEDICARE

## 2025-01-10 ENCOUNTER — TELEPHONE (OUTPATIENT)
Dept: PRIMARY CARE | Facility: CLINIC | Age: 70
End: 2025-01-10
Payer: MEDICARE

## 2025-01-10 DIAGNOSIS — J01.90 ACUTE NON-RECURRENT SINUSITIS, UNSPECIFIED LOCATION: Primary | ICD-10-CM

## 2025-01-10 RX ORDER — DOXYCYCLINE 100 MG/1
100 CAPSULE ORAL 2 TIMES DAILY
Qty: 14 CAPSULE | Refills: 0 | Status: SHIPPED | OUTPATIENT
Start: 2025-01-10 | End: 2025-01-17

## 2025-01-10 NOTE — PROGRESS NOTES
"    Physical Therapy Treatment    Patient Name: Dyana Irene  MRN: [id number]  Encounter date:  1/13/2025             Visit Number:  8 (including evaluation)  Planned total visits:    Visits Authorized/Insurance Coverage:  MEDICARE A/B, MMO [place]PP, [place], [place]     Current Problem  Problem List Items Addressed This Visit    None      Surgery  MILD 10/30    Precautions       Pain       Subjective  ***    Objective  ***    Treatment:     Walk x3 laps fwd, bwd, s/s across pool    Hamstring stretch at stairs  2x30\" ea   Lumbar flexion stretch with noodle seated x15 5\" hold     At rail, 4' depth, cues for TrA brace, x15 ea:  Heel raise  Hip abduction  Hip extension  Hip flexion/SLR  Hamstring curl  March in place    Deep, 5' with noodle under arms, BUE support:  Decompression x2'  Hip abd/adduction x2'  XC ski x2'  Bike x2'  Decompression x2'    Current HEP:  Access Code: [email or web address]  URL: [email or web address]  Date: 10/18/2024  Prepared by: Raymond Huddleston    Exercises  - Seated Hamstring Stretch  - 1 x daily - 7 x weekly - 1 sets - 3 reps - 30 second hold  - Seated Transversus Abdominis Bracing  - 1 x daily - 7 x weekly - 2 sets - 10 reps - 3 second hold  - Seated Hip Adduction Squeeze with Ball  - 1 x daily - 7 x weekly - 2 sets - 10 reps  - Seated Hip Abduction with Resistance  - 1 x daily - 7 x weekly - 2 sets - 10 reps  - Seated March  - 1 x daily - 7 x weekly - 2 sets - 10 reps    Assessment:  Pt's response to treatment: ***  Areas of improvements: ***  Limitations/deficits: ***    Pain end of session: ***/10    Plan:     Continue with current POC/no changes    Assessment of current progress against goals:  Progressing toward functional goals    Goals:  Active       PT Problem       STG, 5 visits       Start:  10/18/24    Expected End:  12/02/24       Pt will be independent in HEP to improve LE and core strength, mobility, and function.  Pt will report 50% reduction in pain with functional mobility such as " standing, walking, and stairs.         Pt will increase strength in core and LEs by 1/2 MMT in all planes for improved performance of functional mobility.         Start:  10/18/24    Expected End:  01/16/25            Pt will demonstrate no more than 25% limitation in lumbar AROM without pain in all planes for improved performance of ADLs.         Start:  10/18/24    Expected End:  01/16/25            Pt will ambulate moderate community distances across all surfaces without deviation and without pain.        Start:  10/18/24    Expected End:  01/16/25            Pt will stand for >30 minutes with UE support PRN without increase in pain for improved tolerance to household tasks such as cooking.         Start:  10/18/24    Expected End:  01/16/25            Pt will perform 5x sit to  11s without pain for improved functional strength and mobility.        Start:  10/18/24    Expected End:  01/16/25

## 2025-01-10 NOTE — TELEPHONE ENCOUNTER
Covid positive on new years romulo-now sinus issues are lingering-head congestion, nasal congestion with dark green discharge. Requesting a med if possible to [facility].  Last OV 12/31/24

## 2025-01-13 ENCOUNTER — APPOINTMENT (OUTPATIENT)
Dept: PHYSICAL THERAPY | Facility: CLINIC | Age: 70
End: 2025-01-13
Payer: MEDICARE

## 2025-01-15 ENCOUNTER — APPOINTMENT (OUTPATIENT)
Dept: PHYSICAL THERAPY | Facility: CLINIC | Age: 70
End: 2025-01-15
Payer: MEDICARE

## 2025-01-15 ENCOUNTER — APPOINTMENT (OUTPATIENT)
Dept: PAIN MEDICINE | Facility: CLINIC | Age: 70
End: 2025-01-15
Payer: MEDICARE

## 2025-01-16 ENCOUNTER — APPOINTMENT (OUTPATIENT)
Dept: PHYSICAL THERAPY | Facility: CLINIC | Age: 70
End: 2025-01-16
Payer: MEDICARE

## 2025-01-20 DIAGNOSIS — E78.5 HYPERLIPIDEMIA, UNSPECIFIED: ICD-10-CM

## 2025-01-20 DIAGNOSIS — F41.8 DEPRESSION WITH ANXIETY: ICD-10-CM

## 2025-01-20 DIAGNOSIS — J01.90 ACUTE NON-RECURRENT SINUSITIS, UNSPECIFIED LOCATION: ICD-10-CM

## 2025-01-20 DIAGNOSIS — I10 ESSENTIAL (PRIMARY) HYPERTENSION: ICD-10-CM

## 2025-01-20 DIAGNOSIS — E87.6 HYPOKALEMIA: ICD-10-CM

## 2025-01-20 DIAGNOSIS — E78.5 HYPERLIPIDEMIA, UNSPECIFIED HYPERLIPIDEMIA TYPE: ICD-10-CM

## 2025-01-20 RX ORDER — DULOXETIN HYDROCHLORIDE 30 MG/1
30 CAPSULE, DELAYED RELEASE ORAL DAILY
Qty: 90 CAPSULE | Refills: 1 | Status: SHIPPED | OUTPATIENT
Start: 2025-01-20 | End: 2025-07-19

## 2025-01-20 RX ORDER — CHLORTHALIDONE 25 MG/1
25 TABLET ORAL DAILY
Qty: 90 TABLET | Refills: 1 | Status: CANCELLED | OUTPATIENT
Start: 2025-01-20

## 2025-01-20 RX ORDER — POTASSIUM CHLORIDE 20 MEQ/1
20 TABLET, EXTENDED RELEASE ORAL DAILY
Qty: 90 TABLET | Refills: 1 | Status: CANCELLED | OUTPATIENT
Start: 2025-01-20

## 2025-01-20 NOTE — TELEPHONE ENCOUNTER
Last seen: 13/21/2024  Potassium Last Filled:    08/13/2024 #90 Refills:1  Cymbalta 30mg Last filled: 12/31/2024   Chlorthalidone last filled: 07/29/2024 #90 Refills:1     Per patient they are doing very well on increased dose of Cymbalta and would like to continue with increased dosage.

## 2025-01-21 DIAGNOSIS — E78.5 HYPERLIPIDEMIA, UNSPECIFIED: ICD-10-CM

## 2025-01-21 DIAGNOSIS — E78.5 HYPERLIPIDEMIA, UNSPECIFIED HYPERLIPIDEMIA TYPE: ICD-10-CM

## 2025-01-21 RX ORDER — ATORVASTATIN CALCIUM 80 MG/1
80 TABLET, FILM COATED ORAL DAILY
Qty: 90 TABLET | Refills: 3 | OUTPATIENT
Start: 2025-01-21

## 2025-01-21 RX ORDER — EZETIMIBE 10 MG/1
10 TABLET ORAL DAILY
Qty: 90 TABLET | Refills: 3 | OUTPATIENT
Start: 2025-01-21

## 2025-01-21 RX ORDER — EZETIMIBE 10 MG/1
10 TABLET ORAL DAILY
Qty: 90 TABLET | Refills: 3 | Status: SHIPPED | OUTPATIENT
Start: 2025-01-21

## 2025-01-21 RX ORDER — ATORVASTATIN CALCIUM 80 MG/1
80 TABLET, FILM COATED ORAL DAILY
Qty: 90 TABLET | Refills: 3 | Status: SHIPPED | OUTPATIENT
Start: 2025-01-21

## 2025-01-21 NOTE — TELEPHONE ENCOUNTER
Patient was advised that she needs to get these medications from the cardiologist and she is upset and doesn't understand why she can't get them from PCP

## 2025-01-22 ENCOUNTER — TELEPHONE (OUTPATIENT)
Dept: CARDIOLOGY | Facility: CLINIC | Age: 70
End: 2025-01-22
Payer: MEDICARE

## 2025-01-22 ENCOUNTER — APPOINTMENT (OUTPATIENT)
Dept: PHYSICAL THERAPY | Facility: CLINIC | Age: 70
End: 2025-01-22
Payer: MEDICARE

## 2025-01-22 DIAGNOSIS — E87.6 HYPOKALEMIA: ICD-10-CM

## 2025-01-22 DIAGNOSIS — I10 ESSENTIAL (PRIMARY) HYPERTENSION: ICD-10-CM

## 2025-01-22 RX ORDER — POTASSIUM CHLORIDE 20 MEQ/1
20 TABLET, EXTENDED RELEASE ORAL 2 TIMES DAILY
Qty: 180 TABLET | Refills: 3 | Status: SHIPPED | OUTPATIENT
Start: 2025-01-22 | End: 2025-01-22 | Stop reason: SDUPTHER

## 2025-01-22 RX ORDER — CHLORTHALIDONE 25 MG/1
25 TABLET ORAL DAILY
Qty: 90 TABLET | Refills: 3 | Status: SHIPPED | OUTPATIENT
Start: 2025-01-22 | End: 2025-01-22 | Stop reason: SDUPTHER

## 2025-01-22 RX ORDER — POTASSIUM CHLORIDE 20 MEQ/1
20 TABLET, EXTENDED RELEASE ORAL 2 TIMES DAILY
Qty: 180 TABLET | Refills: 3 | Status: SHIPPED | OUTPATIENT
Start: 2025-01-22

## 2025-01-22 RX ORDER — CHLORTHALIDONE 25 MG/1
25 TABLET ORAL DAILY
Qty: 90 TABLET | Refills: 3 | Status: SHIPPED | OUTPATIENT
Start: 2025-01-22

## 2025-01-22 NOTE — TELEPHONE ENCOUNTER
Patient called the office today stating that her PCP won't refill some rx's.  She is asking if you will?  Chlorthalidone 25mg tab 1 tab PO every day, also Potassium chloride CR 40 mEq ER tab, take 1 tab PO every day, her Potassium was increased by PCP from 20 to 40mg daily to [facility]  Please advise, thanks

## 2025-01-24 ENCOUNTER — APPOINTMENT (OUTPATIENT)
Dept: RADIOLOGY | Facility: CLINIC | Age: 70
End: 2025-01-24
Payer: MEDICARE

## 2025-01-24 NOTE — PROGRESS NOTES
"    Physical Therapy Treatment    Patient Name: Dyana Irene  MRN: [id number]  Encounter date:  1/27/2025  Time Calculation  Start Time: 1130  Stop Time: 1213  Time Calculation (min): 43 min     PT Therapeutic Procedures Time Entry  Aquatic Therapy Time Entry: 43    Visit Number:  8 (including evaluation)  Planned total visits: 10 visits  Visits Authorized/Insurance Coverage:  MEDICARE A/B, MMO SUPP, [place], NO AUTH     Current Problem  Problem List Items Addressed This Visit             ICD-10-CM    Spinal stenosis, lumbar region, with neurogenic claudication M48.062    Lumbosacral spondylosis without myelopathy M47.817    Other chronic pain G89.29       Surgery  MILD 10/30    Precautions  Precautions  Precautions Comment: None    Pain  Pain Assessment: 0-10  0-10 (Numeric) Pain Score: 2    Subjective  Pt not to therapy in 4 weeks due to illness. Pain has been much better in that time. Symptoms can flare up with activity but severity and duration are much better. L low back and LE are the worst.     Objective  Flexed trunk posture on land and in pool. Decreased amy with gait    Treatment:  Aquatic Exercise  Aquatic Exercise Performed: Yes  Walk x3 laps fwd, bwd, s/s across pool    Hamstring stretch at stairs  2x30\" ea     At rail, 4' depth, cues for TrA brace, x15 ea:  Heel raise  Hip abduction  Hip extension  Hip flexion/SLR  Hamstring curl    March across pool x3 laps     Deep, 5' with noodle under arms, BUE support:  Decompression x2'  Hip abd/adduction x2'  XC ski x2'  Bike x2'  Decompression x2'    Current HEP:  Access Code: [email or web address]  URL: [email or web address]  Date: 10/18/2024  Prepared by: Raymond Huddleston    Exercises  - Seated Hamstring Stretch  - 1 x daily - 7 x weekly - 1 sets - 3 reps - 30 second hold  - Seated Transversus Abdominis Bracing  - 1 x daily - 7 x weekly - 2 sets - 10 reps - 3 second hold  - Seated Hip Adduction Squeeze with Ball  - 1 x daily - 7 x weekly - 2 sets - 10 reps  - Seated " Hip Abduction with Resistance  - 1 x daily - 7 x weekly - 2 sets - 10 reps  - Seated March  - 1 x daily - 7 x weekly - 2 sets - 10 reps    Assessment:  Pt's response to treatment: Aquatics resumed after layoff due to illness. Pain present intermittently on L side during but overall was much better in the pool. Pt doing better overall recently, will be reassessed next session with anticipation of extending POC to continue PT intervention.  Areas of improvements: recent symptom improvement  Limitations/deficits: posture     Pain end of session: 1/10    Plan:  OP PT Plan  Treatment/Interventions: Aquatic therapy, Dry needling, Electrical stimulation, Manual therapy, Neuromuscular re-education, Taping techniques, Therapeutic activities, Therapeutic exercises, Ultrasound  PT Plan: Skilled PT  PT Frequency: 2 times per week  Duration: 10 visits  Certification Period Start Date: 10/18/24  Certification Period End Date: 01/16/25  Number of Treatments Authorized: MN  Rehab Potential: Good  Plan of Care Agreement: Patient  Reassess next visit    Assessment of current progress against goals:  Progressing toward functional goals    Goals:  Active       PT Problem       STG, 5 visits       Start:  10/18/24    Expected End:  12/02/24       Pt will be independent in HEP to improve LE and core strength, mobility, and function.  Pt will report 50% reduction in pain with functional mobility such as standing, walking, and stairs.         Pt will increase strength in core and LEs by 1/2 MMT in all planes for improved performance of functional mobility.         Start:  10/18/24    Expected End:  01/16/25            Pt will demonstrate no more than 25% limitation in lumbar AROM without pain in all planes for improved performance of ADLs.         Start:  10/18/24    Expected End:  01/16/25            Pt will ambulate moderate community distances across all surfaces without deviation and without pain.        Start:  10/18/24    Expected  End:  01/16/25            Pt will stand for >30 minutes with UE support PRN without increase in pain for improved tolerance to household tasks such as cooking.         Start:  10/18/24    Expected End:  01/16/25            Pt will perform 5x sit to  11s without pain for improved functional strength and mobility.        Start:  10/18/24    Expected End:  01/16/25

## 2025-01-27 ENCOUNTER — TREATMENT (OUTPATIENT)
Dept: PHYSICAL THERAPY | Facility: CLINIC | Age: 70
End: 2025-01-27
Payer: MEDICARE

## 2025-01-27 DIAGNOSIS — M47.817 LUMBOSACRAL SPONDYLOSIS WITHOUT MYELOPATHY: ICD-10-CM

## 2025-01-27 DIAGNOSIS — G89.29 OTHER CHRONIC PAIN: ICD-10-CM

## 2025-01-27 DIAGNOSIS — M48.062 SPINAL STENOSIS, LUMBAR REGION, WITH NEUROGENIC CLAUDICATION: ICD-10-CM

## 2025-01-27 PROCEDURE — 97113 AQUATIC THERAPY/EXERCISES: CPT | Mod: GP

## 2025-01-27 ASSESSMENT — PAIN - FUNCTIONAL ASSESSMENT: PAIN_FUNCTIONAL_ASSESSMENT: 0-10

## 2025-01-27 ASSESSMENT — PAIN SCALES - GENERAL: PAINLEVEL_OUTOF10: 2

## 2025-01-28 NOTE — PROGRESS NOTES
Physical Therapy Treatment and Progress Report    Patient Name: Dyana Irene  MRN: [id number]  Encounter date:  1/29/2025  Time Calculation  Start Time: 1105  Stop Time: 1144  Time Calculation (min): 39 min     PT Therapeutic Procedures Time Entry  Aquatic Therapy Time Entry: 39    Visit Number:  9 (including evaluation)  Planned total visits: 10 additional visits, 19 total  Visits Authorized/Insurance Coverage:  MEDICARE A/B, MMO SUPP, [place], NO AUTH     Current Problem  Problem List Items Addressed This Visit             ICD-10-CM    Spinal stenosis, lumbar region, with neurogenic claudication M48.062    Lumbosacral spondylosis without myelopathy M47.817    Other chronic pain G89.29       Surgery  MILD 10/30    Precautions  Precautions  Precautions Comment: None    Pain  Pain Assessment: 0-10  0-10 (Numeric) Pain Score: 0 - No pain (Pain at max over past week 3/10. Pt still avoids strenuous activity)    Subjective  Pt notes good pain relief following pool visits as well as reduced pain overall over past few weeks. She notices an easier time maintaining upright posture, butter ROM, and better activity tolerance. Pain still present and can get severe with heavy activities. Pt would like to continue with aquatics after today.    Objective  Lumbar ROM Range   Flexion 25% limited, mild pain with return to extension   Extension 50% limited, mild pain   R rotation 50% limited   L rotation 50% limited   R sidebend 25% limited   L sidebend 25% limited     LE MMT L R   Hip flexion 4+/5 4+/5   Hip extension 4/5 4/5   Hip abduction 3+/5 3+/5   Hip adduction 3+/5 3+/5   Knee flexion 4+/5 4+/5   Knee extension 4+/5 4+/5     Gait: Flexed posture, mild decrease in LLE stance time. Pain with longer community distances, minimal with household and short community walking  Standing tolerance: 15-20 minutes before pain increases  5x sit to stand: 13s, no increase in pain  Oswestry: 24/50    Treatment:  Aquatic Exercise  Aquatic  "Exercise Performed: Yes  Walk x3 laps fwd, bwd, s/s across pool    Hamstring stretch at stairs  2x30\" ea     At rail, 4' depth, cues for TrA brace, x15 ea:  Heel raise  Hip abduction  Hip extension  Hip flexion/SLR  Hamstring curl  March in place    Deep, 5' with noodle under arms, BUE support:  Decompression x2'  Hip abd/adduction x2'  XC ski x2'  Bike x2'  Decompression x2'    Current HEP:  Access Code: [email or web address]  URL: [email or web address]  Date: 10/18/2024  Prepared by: Raymond Huddleston    Exercises  - Seated Hamstring Stretch  - 1 x daily - 7 x weekly - 1 sets - 3 reps - 30 second hold  - Seated Transversus Abdominis Bracing  - 1 x daily - 7 x weekly - 2 sets - 10 reps - 3 second hold  - Seated Hip Adduction Squeeze with Ball  - 1 x daily - 7 x weekly - 2 sets - 10 reps  - Seated Hip Abduction with Resistance  - 1 x daily - 7 x weekly - 2 sets - 10 reps  - Seated March  - 1 x daily - 7 x weekly - 2 sets - 10 reps    Assessment:  Pt's response to treatment: Pt has completed 9 PT sessions to address LBP. Pt remains with pain however intensity has decreased overall. Strength, ROM, and activity levels are all getting better. Pt is having less issue with prolonged standing and is walking further distances without as much restriction. Pt would benefit from continued skilled intervention to address remaining impairment and improve function.    Pain end of session: 0/10    Plan:  OP PT Plan  Treatment/Interventions: Aquatic therapy, Dry needling, Electrical stimulation, Manual therapy, Neuromuscular re-education, Taping techniques, Therapeutic activities, Therapeutic exercises, Ultrasound  PT Plan: Skilled PT  PT Frequency: 2 times per week  Duration: 10 additional visits, 19 total  Certification Period Start Date: 01/29/25  Certification Period End Date: 04/29/25  Number of Treatments Authorized: MN  Rehab Potential: Good  Plan of Care Agreement: Patient  Continue with current POC/no changes    Assessment of " current progress against goals:  Progressing toward functional goals    Goals:  Active       PT Problem       STG, 5 visits (Met)       Start:  10/18/24    Expected End:  04/29/25    Resolved:  01/29/25    Pt will be independent in HEP to improve LE and core strength, mobility, and function.  Pt will report 50% reduction in pain with functional mobility such as standing, walking, and stairs.         Pt will increase strength in core and LEs by 1/2 MMT in all planes for improved performance of functional mobility.   (Progressing)       Start:  10/18/24    Expected End:  04/29/25            Pt will demonstrate no more than 25% limitation in lumbar AROM without pain in all planes for improved performance of ADLs.   (Progressing)       Start:  10/18/24    Expected End:  04/29/25            Pt will ambulate moderate community distances across all surfaces without deviation and without pain.  (Progressing)       Start:  10/18/24    Expected End:  04/29/25            Pt will stand for >30 minutes with UE support PRN without increase in pain for improved tolerance to household tasks such as cooking.   (Progressing)       Start:  10/18/24    Expected End:  04/29/25            Pt will perform 5x sit to  11s without pain for improved functional strength and mobility.  (Progressing)       Start:  10/18/24    Expected End:  04/29/25               Physical therapy

## 2025-01-29 ENCOUNTER — TREATMENT (OUTPATIENT)
Dept: PHYSICAL THERAPY | Facility: CLINIC | Age: 70
End: 2025-01-29
Payer: MEDICARE

## 2025-01-29 DIAGNOSIS — M48.062 SPINAL STENOSIS, LUMBAR REGION, WITH NEUROGENIC CLAUDICATION: ICD-10-CM

## 2025-01-29 DIAGNOSIS — M47.817 LUMBOSACRAL SPONDYLOSIS WITHOUT MYELOPATHY: ICD-10-CM

## 2025-01-29 DIAGNOSIS — G89.29 OTHER CHRONIC PAIN: ICD-10-CM

## 2025-01-29 PROCEDURE — 97113 AQUATIC THERAPY/EXERCISES: CPT | Mod: GP

## 2025-01-29 ASSESSMENT — PAIN - FUNCTIONAL ASSESSMENT: PAIN_FUNCTIONAL_ASSESSMENT: 0-10

## 2025-01-29 ASSESSMENT — PAIN SCALES - GENERAL: PAINLEVEL_OUTOF10: 0 - NO PAIN

## 2025-01-31 ENCOUNTER — HOSPITAL ENCOUNTER (OUTPATIENT)
Dept: RADIOLOGY | Facility: CLINIC | Age: 70
Discharge: HOME | End: 2025-01-31
Payer: MEDICARE

## 2025-01-31 DIAGNOSIS — Z12.31 ENCOUNTER FOR SCREENING MAMMOGRAM FOR MALIGNANT NEOPLASM OF BREAST: ICD-10-CM

## 2025-01-31 PROCEDURE — 77067 SCR MAMMO BI INCL CAD: CPT

## 2025-02-04 ENCOUNTER — OFFICE VISIT (OUTPATIENT)
Dept: PAIN MEDICINE | Facility: CLINIC | Age: 70
End: 2025-02-04
Payer: MEDICARE

## 2025-02-04 VITALS
HEIGHT: 65 IN | BODY MASS INDEX: 43.15 KG/M2 | WEIGHT: 259 LBS | HEART RATE: 95 BPM | RESPIRATION RATE: 16 BRPM | SYSTOLIC BLOOD PRESSURE: 114 MMHG | OXYGEN SATURATION: 95 % | DIASTOLIC BLOOD PRESSURE: 78 MMHG

## 2025-02-04 DIAGNOSIS — M47.817 LUMBOSACRAL SPONDYLOSIS WITHOUT MYELOPATHY: Primary | ICD-10-CM

## 2025-02-04 DIAGNOSIS — M46.1 ARTHRITIS OF SACROILIAC JOINT: ICD-10-CM

## 2025-02-04 PROCEDURE — 99214 OFFICE O/P EST MOD 30 MIN: CPT | Performed by: ANESTHESIOLOGY

## 2025-02-04 PROCEDURE — 1157F ADVNC CARE PLAN IN RCRD: CPT | Performed by: ANESTHESIOLOGY

## 2025-02-04 PROCEDURE — 1125F AMNT PAIN NOTED PAIN PRSNT: CPT | Performed by: ANESTHESIOLOGY

## 2025-02-04 PROCEDURE — 3074F SYST BP LT 130 MM HG: CPT | Performed by: ANESTHESIOLOGY

## 2025-02-04 PROCEDURE — 1123F ACP DISCUSS/DSCN MKR DOCD: CPT | Performed by: ANESTHESIOLOGY

## 2025-02-04 PROCEDURE — 3008F BODY MASS INDEX DOCD: CPT | Performed by: ANESTHESIOLOGY

## 2025-02-04 PROCEDURE — 1159F MED LIST DOCD IN RCRD: CPT | Performed by: ANESTHESIOLOGY

## 2025-02-04 PROCEDURE — 3078F DIAST BP <80 MM HG: CPT | Performed by: ANESTHESIOLOGY

## 2025-02-04 PROCEDURE — G2211 COMPLEX E/M VISIT ADD ON: HCPCS | Performed by: ANESTHESIOLOGY

## 2025-02-04 PROCEDURE — 1160F RVW MEDS BY RX/DR IN RCRD: CPT | Performed by: ANESTHESIOLOGY

## 2025-02-04 ASSESSMENT — ENCOUNTER SYMPTOMS
RESPIRATORY NEGATIVE: 1
ALLERGIC/IMMUNOLOGIC NEGATIVE: 1
ENDOCRINE NEGATIVE: 1
GASTROINTESTINAL NEGATIVE: 1
BACK PAIN: 1
EYES NEGATIVE: 1
CONSTITUTIONAL NEGATIVE: 1
CARDIOVASCULAR NEGATIVE: 1
NEUROLOGICAL NEGATIVE: 1
HEMATOLOGIC/LYMPHATIC NEGATIVE: 1
PSYCHIATRIC NEGATIVE: 1

## 2025-02-04 ASSESSMENT — PAIN DESCRIPTION - DESCRIPTORS: DESCRIPTORS: ACHING

## 2025-02-04 ASSESSMENT — PAIN SCALES - GENERAL
PAINLEVEL_OUTOF10: 3
PAINLEVEL_OUTOF10: 3

## 2025-02-04 ASSESSMENT — PAIN - FUNCTIONAL ASSESSMENT: PAIN_FUNCTIONAL_ASSESSMENT: 0-10

## 2025-02-04 NOTE — PATIENT INSTRUCTIONS
What to Expect When you are having a procedure    Pending your health insurance provider, an authorization may be required before your procedure can be scheduled.    On average procedure authorization can take from 3-10 business days to obtain, however, some insurances may take up to 30 days if an appeal needs to be written or if your provider needs to talk with the insurance company to explain why the procedure needs to be performed.  Procedures are completed in one of 3 locations.   [facility]  [address], [place] [address]  [facility]  [address], [place] [address]  [facility]  [address] [place], [place] [address]  Our office staff will give you the date of your procedure and the procedure time will be provided by the location you are having your procedure at.    The schedulers will call you typically 1-2 days before your procedure to give you an arrival time and procedure time.   Procedures are typically completed with local anesthetic, to numb the skin, and take anywhere from 2-10 min to complete.    If needed light Oral or IV sedation is available, we are not able to put patients asleep for their procedure.   The hospital facilities do REQUIRE YOU TO HAVE A , however exceptions can be made for certain procedure.    We are placing numbing medication by nerves, which can cause temporary numbing to muscles in rare cases.  If you arrive with no , your procedure may be cancelled.    For most procedures, plan to be at the facility for about 1 -2 hr.    Your bedside nurse will provide post-procedure instructions to you at discharge.

## 2025-02-04 NOTE — PROGRESS NOTES
Subjective   Patient ID: Dyana Irene is a 69 y.o. female who presents for Back Pain.  Back Pain    Patient here today for follow-up.  She underwent L2-3 and L3-4 mild procedure over a months ago.  She reports that her back pain had significantly improved and she was working with physical therapy and had gotten to the point where she had no pain.  She did come down with COVID at the end of December and she started having left greater than right lateral back pain which was new for her.  She restarted physical therapy has been doing the exercises and she found some relief with this.  However when she is at home doing homework and repetitively bending over and doing lumbar flexion exercises that she will get this lateral ache there is a spot that she can pinpoint and put her thumb on it in the lateral low back.  This is different than her spinal stenosis pain she had in the past.  She reports no numbness, tingling, weakness in the lower extremities.  She has improved with standing and walking tolerance since the mild procedure.  She would like to know what this new area of pain is.  Been attending physical therapy since November 11.  She has completed 9 total visits as well as a daily exercise program.    Review of Systems   Constitutional: Negative.    HENT: Negative.     Eyes: Negative.    Respiratory: Negative.     Cardiovascular: Negative.    Gastrointestinal: Negative.    Endocrine: Negative.    Genitourinary: Negative.    Musculoskeletal:  Positive for back pain.   Skin: Negative.    Allergic/Immunologic: Negative.    Neurological: Negative.    Hematological: Negative.    Psychiatric/Behavioral: Negative.         Objective   Physical Exam  Vitals and nursing note reviewed.   Constitutional:       Appearance: Normal appearance.   HENT:      Head: Normocephalic and atraumatic.      Right Ear: Ear canal and external ear normal.      Left Ear: Ear canal and external ear normal.      Nose: Nose normal.       Mouth/Throat:      Mouth: Mucous membranes are moist.      Pharynx: Oropharynx is clear.   Eyes:      Conjunctiva/sclera: Conjunctivae normal.      Pupils: Pupils are equal, round, and reactive to light.   Cardiovascular:      Rate and Rhythm: Normal rate.   Pulmonary:      Effort: Pulmonary effort is normal. No respiratory distress.   Musculoskeletal:      Cervical back: Normal range of motion and neck supple.      Lumbar back: Bony tenderness present. Decreased range of motion. Negative right straight leg raise test and negative left straight leg raise test. No scoliosis.        Back:       Comments: SI joint provocation positive bilaterally  Gaenslen sign positive bilaterally  ESTEFANY sign positive bilaterally  Jacob finger positive bilaterally   Skin:     General: Skin is warm and dry.   Neurological:      Mental Status: She is alert and oriented to person, place, and time.      Sensory: Sensation is intact.      Motor: Motor function is intact.      Deep Tendon Reflexes:      Reflex Scores:       Patellar reflexes are 2+ on the right side and 2+ on the left side.     Comments:   Patient can stand from seated position unassisted.   Psychiatric:         Mood and Affect: Mood normal.         Thought Content: Thought content normal.         Assessment/Plan   Problem List Items Addressed This Visit             ICD-10-CM    Lumbosacral spondylosis without myelopathy - Primary M47.817        I nice discussion with the patient today our plan will be as follows.    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        Physically: Patient has been enrolled in physical therapy since November 15.  She did take a brief pause because of a COVID infection.  She should continue with her home exercise program as well as continuing with weekly physical therapy.    Psychologically: No issues at this time.    Medication: No changes at this time.    Duration: 6 weeks.    Intervention: Patient with acute  inflammation of her sacroiliac joints which is now causing discomfort for her.  She does have positive imaging consistent with osteoarthritis of the sacroiliac joints as well as a history and physical exam consistent with this as well.  We will move forward with bilateral sacroiliac joint injections under fluoroscopic guidance and local anesthetic.        Please note that this report has been produced using speech recognition software. It may contain errors related to grammar, punctuation or spelling. Electronically signed, but not reviewed.       Osman Lux MD 02/04/25 11:24 AM

## 2025-02-04 NOTE — PROGRESS NOTES
MEDICATION NAME: tramadol  STRENGTH: 50mg  LAST FILL DATE: 2024  DATE LAST TAKEN: 1 week ago  QUANTITY FILLED: 120  QUANTITY REMAININ  COUNT COMPLETED BY: MADINA CUNNINGHAM RN and ELEN HASKINS LPN      UDS LAST COMPLETED: 2024  CONTROLLED SUBSTANCES AGREEMENT LAST SIGNED: 2024  ORT LAST COMPLETED:2024  Modified Osw[employer] disability form filled out annually.

## 2025-02-06 ENCOUNTER — APPOINTMENT (OUTPATIENT)
Dept: PHYSICAL THERAPY | Facility: CLINIC | Age: 70
End: 2025-02-06
Payer: MEDICARE

## 2025-02-10 ENCOUNTER — APPOINTMENT (OUTPATIENT)
Dept: PHYSICAL THERAPY | Facility: CLINIC | Age: 70
End: 2025-02-10
Payer: MEDICARE

## 2025-02-12 ENCOUNTER — ANCILLARY PROCEDURE (OUTPATIENT)
Dept: RADIOLOGY | Facility: EXTERNAL LOCATION | Age: 70
End: 2025-02-12
Payer: MEDICARE

## 2025-02-12 DIAGNOSIS — M46.1 SACROILIITIS, NOT ELSEWHERE CLASSIFIED (CMS-HCC): ICD-10-CM

## 2025-02-12 PROCEDURE — 27096 INJECT SACROILIAC JOINT: CPT | Performed by: ANESTHESIOLOGY

## 2025-02-12 NOTE — PROGRESS NOTES
Preprocedure diagnosis: Sacroiliitis  Postprocedure diagnosis sacroiliitis    Procedure performed: Bilateral sacroiliac joint injection under fluoroscopic guidance    Physician: Osman Lux MD    Anesthesia: Local    Complications: none    Blood loss:  none    Clinical note: This is a very pleasant 69-year-old female who suffers with low back pain here meeting all medical criteria for above-mentioned procedure.    Procedure:      The patient was identified in the preoperative area.  The procedure was discussed in detail including its risks, benefits, and alternatives.  Signed consent was obtained and the patient agreed to proceed.       The patient was brought to the [facility] procedure room and positioned herself in the prone position onto the procedure room table.  A pillow was placed below the patient's abdomen to decrease lumbar lordosis and a safety strap was placed across the legs.  Next the left and right sacroiliac joint/s were identified with the aid of fluoroscopy in the anterior-posterior view.  The patient was prepped and draped in the usual sterile fashion with Chlorprep. The skin and subcutaneous tissues were infiltrated with 2.5 ml of 2% Lidocaine using a 25 gauge 1 1/2 inch needle on each side. A 22 gauge spinal needle was advanced with the aid of fluoroscopy in the anterior-posterior view into the inferior posterior pole of the left and right sacroiliac joint/s. Needle placement was confirmed with the aid of fluoroscopy in the anterior-posterior view. After negative aspiration, 0.5ml of omnipaque contrast was injected under live fluoroscopy into each needle which showed appropriate spread. The left and right SI joint/s was delineated with the aid of fluoroscopy in the anterior-posterior view.     After negative aspiration, 4ml of 0.5% bupivacaine along with 40mg of triamcinolone was injected into each needle     All needles were removed intact. Hemostasis was maintained, and there were  no paresthesias or complications noted. The area was cleaned and a bandage was applied as appropriate.  The patient was then transferred to the recovery area. The procedure was completed without complications and was tolerated well. The patient was monitored after the procedure. The patient (or responsible party) was given post-procedure and discharge instructions to follow at home. The patient was discharged in stable condition. A follow up appointment was made.          Osman Lux MD

## 2025-02-18 NOTE — PROGRESS NOTES
"    Physical Therapy Treatment    Patient Name: Dyana Irene  MRN: [id number]  Encounter date:  2/19/2025             Visit Number:  10 (including evaluation)  Planned total visits:    Visits Authorized/Insurance Coverage:  MEDICARE A/B, M[place], [place], [place]     Current Problem  Problem List Items Addressed This Visit    None        Surgery  MILD 10/30    Precautions       Pain       Subjective  ***    Objective  ***    Treatment:     Walk x3 laps fwd, bwd, s/s across pool    Hamstring stretch at stairs  2x30\" ea     At rail, 4' depth, cues for TrA brace, x15 ea:  Heel raise  Hip abduction  Hip extension  Hip flexion/SLR  Hamstring curl  March in place    Deep, 5' with noodle under arms, BUE support:  Decompression x2'  Hip abd/adduction x2'  XC ski x2'  Bike x2'  Decompression x2'    Current HEP:  Access Code: [email or web address]  URL: [email or web address]  Date: 10/18/2024  Prepared by: Raymond Huddleston    Exercises  - Seated Hamstring Stretch  - 1 x daily - 7 x weekly - 1 sets - 3 reps - 30 second hold  - Seated Transversus Abdominis Bracing  - 1 x daily - 7 x weekly - 2 sets - 10 reps - 3 second hold  - Seated Hip Adduction Squeeze with Ball  - 1 x daily - 7 x weekly - 2 sets - 10 reps  - Seated Hip Abduction with Resistance  - 1 x daily - 7 x weekly - 2 sets - 10 reps  - Seated March  - 1 x daily - 7 x weekly - 2 sets - 10 reps    Assessment:  Pt's response to treatment: ***    Pain end of session: ***/10    Plan:     Continue with current POC/no changes    Assessment of current progress against goals:  Progressing toward functional goals    Goals:  Active       PT Problem       STG, 5 visits (Met)       Start:  10/18/24    Expected End:  04/29/25    Resolved:  01/29/25    Pt will be independent in HEP to improve LE and core strength, mobility, and function.  Pt will report 50% reduction in pain with functional mobility such as standing, walking, and stairs.         Pt will increase strength in core and LEs by 1/2 " MMT in all planes for improved performance of functional mobility.   (Progressing)       Start:  10/18/24    Expected End:  04/29/25            Pt will demonstrate no more than 25% limitation in lumbar AROM without pain in all planes for improved performance of ADLs.   (Progressing)       Start:  10/18/24    Expected End:  04/29/25            Pt will ambulate moderate community distances across all surfaces without deviation and without pain.  (Progressing)       Start:  10/18/24    Expected End:  04/29/25            Pt will stand for >30 minutes with UE support PRN without increase in pain for improved tolerance to household tasks such as cooking.   (Progressing)       Start:  10/18/24    Expected End:  04/29/25            Pt will perform 5x sit to  11s without pain for improved functional strength and mobility.  (Progressing)       Start:  10/18/24    Expected End:  04/29/25

## 2025-02-19 ENCOUNTER — APPOINTMENT (OUTPATIENT)
Dept: PHYSICAL THERAPY | Facility: CLINIC | Age: 70
End: 2025-02-19
Payer: MEDICARE

## 2025-02-20 NOTE — PROGRESS NOTES
"    Physical Therapy Treatment    Patient Name: Dyana Irene  MRN: [id number]  Encounter date:  2/21/2025  Time Calculation  Start Time: 0933  Stop Time: 1014  Time Calculation (min): 41 min     PT Therapeutic Procedures Time Entry  Aquatic Therapy Time Entry: 41    Visit Number:  10 (including evaluation)  Planned total visits: 10 additional visits, 19 total  Visits Authorized/Insurance Coverage:  MEDICARE A/B, MMO [place]PP, [place], [place]     Current Problem  Problem List Items Addressed This Visit             ICD-10-CM    Spinal stenosis, lumbar region, with neurogenic claudication M48.062    Lumbosacral spondylosis without myelopathy M47.817    Other chronic pain G89.29         Surgery  MILD 10/30    Precautions  Precautions  Precautions Comment: None    Pain  Pain Assessment: 0-10  0-10 (Numeric) Pain Score: 5 - Moderate pain    Subjective  Pt not to therapy in 3 weeks due to scheduling conflicts. She had onset of SI pain in that time, had injections with minimal relief. Despite increase in pain, intensity still better than a few months ago.     Objective  Flexed posture, somewhat stiff gait on pool deck     Treatment:  Aquatic Exercise  Aquatic Exercise Performed: Yes  Walk x3 laps fwd, bwd, s/s across pool    Hamstring stretch at stairs  2x30\" ea - stopped due to pain    Fwd step up x10 ea     At rail, 4' depth, cues for TrA brace, x15 ea:  Heel raise  Hip abduction  Hip extension  Hip flexion/SLR  Hamstring curl  March in place    Deep, 5' with noodle under arms, BUE support:  Decompression x2'  Hip abd/adduction x2'  XC ski x2'  Bike x2'  Decompression x2'    Current HEP:  Access Code: [email or web address]  URL: [email or web address]  Date: 10/18/2024  Prepared by: Raymond Huddleston    Exercises  - Seated Hamstring Stretch  - 1 x daily - 7 x weekly - 1 sets - 3 reps - 30 second hold  - Seated Transversus Abdominis Bracing  - 1 x daily - 7 x weekly - 2 sets - 10 reps - 3 second hold  - Seated Hip Adduction Squeeze with " Ball  - 1 x daily - 7 x weekly - 2 sets - 10 reps  - Seated Hip Abduction with Resistance  - 1 x daily - 7 x weekly - 2 sets - 10 reps  - Seated March  - 1 x daily - 7 x weekly - 2 sets - 10 reps    Assessment:  Pt's response to treatment: Aquatics resumed after short break due to scheduling issues. Pt had some SI/LBP during but better in pool vs on pool deck. Overall pt with good tolerance to exercise and some relief at exit.     Pain end of session: 3-4/10    Plan:  OP PT Plan  Treatment/Interventions: Aquatic therapy, Dry needling, Electrical stimulation, Manual therapy, Neuromuscular re-education, Taping techniques, Therapeutic activities, Therapeutic exercises, Ultrasound  PT Plan: Skilled PT  PT Frequency: 2 times per week  Duration: 10 additional visits, 19 total  Certification Period Start Date: 01/29/25  Certification Period End Date: 04/29/25  Number of Treatments Authorized: [place]  Rehab Potential: Good  Plan of Care Agreement: Patient  Continue with current POC/no changes    Assessment of current progress against goals:  Progressing toward functional goals    Goals:  Active       PT Problem       STG, 5 visits (Met)       Start:  10/18/24    Expected End:  04/29/25    Resolved:  01/29/25    Pt will be independent in HEP to improve LE and core strength, mobility, and function.  Pt will report 50% reduction in pain with functional mobility such as standing, walking, and stairs.         Pt will increase strength in core and LEs by 1/2 MMT in all planes for improved performance of functional mobility.   (Progressing)       Start:  10/18/24    Expected End:  04/29/25            Pt will demonstrate no more than 25% limitation in lumbar AROM without pain in all planes for improved performance of ADLs.   (Progressing)       Start:  10/18/24    Expected End:  04/29/25            Pt will ambulate moderate community distances across all surfaces without deviation and without pain.  (Progressing)       Start:  10/18/24     Expected End:  04/29/25            Pt will stand for >30 minutes with UE support PRN without increase in pain for improved tolerance to household tasks such as cooking.   (Progressing)       Start:  10/18/24    Expected End:  04/29/25            Pt will perform 5x sit to  11s without pain for improved functional strength and mobility.  (Progressing)       Start:  10/18/24    Expected End:  04/29/25

## 2025-02-21 ENCOUNTER — TREATMENT (OUTPATIENT)
Dept: PHYSICAL THERAPY | Facility: CLINIC | Age: 70
End: 2025-02-21
Payer: MEDICARE

## 2025-02-21 DIAGNOSIS — M48.062 SPINAL STENOSIS, LUMBAR REGION, WITH NEUROGENIC CLAUDICATION: ICD-10-CM

## 2025-02-21 DIAGNOSIS — G89.29 OTHER CHRONIC PAIN: ICD-10-CM

## 2025-02-21 DIAGNOSIS — M47.817 LUMBOSACRAL SPONDYLOSIS WITHOUT MYELOPATHY: ICD-10-CM

## 2025-02-21 PROCEDURE — 97113 AQUATIC THERAPY/EXERCISES: CPT | Mod: GP

## 2025-02-21 ASSESSMENT — PAIN SCALES - GENERAL: PAINLEVEL_OUTOF10: 5 - MODERATE PAIN

## 2025-02-21 ASSESSMENT — PAIN - FUNCTIONAL ASSESSMENT: PAIN_FUNCTIONAL_ASSESSMENT: 0-10

## 2025-02-24 ENCOUNTER — APPOINTMENT (OUTPATIENT)
Dept: PHYSICAL THERAPY | Facility: CLINIC | Age: 70
End: 2025-02-24
Payer: MEDICARE

## 2025-02-24 NOTE — PROGRESS NOTES
"    Physical Therapy Treatment    Patient Name: Dyana Irene  MRN: [id number]  Encounter date:  2/24/2025             Visit Number:  11 (including evaluation)  Planned total visits:    Visits Authorized/Insurance Coverage:  MEDICARE A/B, MMO [place]PP, [place], [place] AUTH     Current Problem  Problem List Items Addressed This Visit    None          Surgery  MILD 10/30    Precautions       Pain       Subjective  ***    Objective  ***    Treatment:     Walk x3 laps fwd, bwd, s/s across pool    Hamstring stretch at stairs  2x30\" ea - stopped due to pain    Fwd step up x10 ea     At rail, 4' depth, cues for TrA brace, x15 ea:  Heel raise  Hip abduction  Hip extension  Hip flexion/SLR  Hamstring curl  March in place    Deep, 5' with noodle under arms, BUE support:  Decompression x2'  Hip abd/adduction x2'  XC ski x2'  Bike x2'  Decompression x2'    Current HEP:  Access Code: [email or web address]  URL: [email or web address]  Date: 10/18/2024  Prepared by: Raymond Huddleston    Exercises  - Seated Hamstring Stretch  - 1 x daily - 7 x weekly - 1 sets - 3 reps - 30 second hold  - Seated Transversus Abdominis Bracing  - 1 x daily - 7 x weekly - 2 sets - 10 reps - 3 second hold  - Seated Hip Adduction Squeeze with Ball  - 1 x daily - 7 x weekly - 2 sets - 10 reps  - Seated Hip Abduction with Resistance  - 1 x daily - 7 x weekly - 2 sets - 10 reps  - Seated March  - 1 x daily - 7 x weekly - 2 sets - 10 reps    Assessment:  Pt's response to treatment: ***    Pain end of session: ***/10    Plan:     Continue with current POC/no changes    Assessment of current progress against goals:  Progressing toward functional goals    Goals:  Active       PT Problem       STG, 5 visits (Met)       Start:  10/18/24    Expected End:  04/29/25    Resolved:  01/29/25    Pt will be independent in HEP to improve LE and core strength, mobility, and function.  Pt will report 50% reduction in pain with functional mobility such as standing, walking, and stairs.         Pt " will increase strength in core and LEs by 1/2 MMT in all planes for improved performance of functional mobility.   (Progressing)       Start:  10/18/24    Expected End:  04/29/25            Pt will demonstrate no more than 25% limitation in lumbar AROM without pain in all planes for improved performance of ADLs.   (Progressing)       Start:  10/18/24    Expected End:  04/29/25            Pt will ambulate moderate community distances across all surfaces without deviation and without pain.  (Progressing)       Start:  10/18/24    Expected End:  04/29/25            Pt will stand for >30 minutes with UE support PRN without increase in pain for improved tolerance to household tasks such as cooking.   (Progressing)       Start:  10/18/24    Expected End:  04/29/25            Pt will perform 5x sit to  11s without pain for improved functional strength and mobility.  (Progressing)       Start:  10/18/24    Expected End:  04/29/25

## 2025-02-25 NOTE — PROGRESS NOTES
"    Physical Therapy Treatment    Patient Name: Dyana Irene  MRN: [id number]  Encounter date:  2/26/2025  Time Calculation  Start Time: 1104  Stop Time: 1146  Time Calculation (min): 42 min     PT Therapeutic Procedures Time Entry  Aquatic Therapy Time Entry: 42    Visit Number:  11 (including evaluation)  Planned total visits: 10 additional visits, 19 total  Visits Authorized/Insurance Coverage:  MEDICARE A/B, MMO SUPP, [place], [place] AUTH     Current Problem  Problem List Items Addressed This Visit             ICD-10-CM    Spinal stenosis, lumbar region, with neurogenic claudication M48.062    Lumbosacral spondylosis without myelopathy M47.817    Other chronic pain G89.29           Surgery  MILD 10/30    Precautions  Precautions  Precautions Comment: None    Pain  Pain Assessment: 0-10  0-10 (Numeric) Pain Score: 2    Subjective  Pain has been better since last visit. Prolonged walking still leads to pain but intensity is down.     Objective  Flexed trunk posture on pool deck, somewhat better with mobility in pool     Treatment:  Aquatic Exercise  Aquatic Exercise Performed: Yes  Walk x3 laps fwd, bwd, s/s across pool    Hamstring stretch at stairs  2x30\" ea - stopped due to pain    Fwd step up x10 ea     At rail, 4' depth, cues for TrA brace, x15 ea:  Heel raise  Hip abduction  Hip extension  Hip flexion/SLR  Hamstring curl  March in place    Deep, 5' with noodle under arms, BUE support:  Decompression x2'  Hip abd/adduction x2'  XC ski x2'  Bike x2'  Decompression x2'    Current HEP:  Access Code: [email or web address]  URL: [email or web address]  Date: 10/18/2024  Prepared by: Raymond Huddleston    Exercises  - Seated Hamstring Stretch  - 1 x daily - 7 x weekly - 1 sets - 3 reps - 30 second hold  - Seated Transversus Abdominis Bracing  - 1 x daily - 7 x weekly - 2 sets - 10 reps - 3 second hold  - Seated Hip Adduction Squeeze with Ball  - 1 x daily - 7 x weekly - 2 sets - 10 reps  - Seated Hip Abduction with Resistance  - 1 x " daily - 7 x weekly - 2 sets - 10 reps  - Seated March - 1 x daily - 7 x weekly - 2 sets - 10 reps    Assessment:  Pt's response to treatment: Aquatics continued, pt showed better posture and gait pattern in pool. Pt felt intermittent discomfort with hip ROM exercises but this did not linger. Pain down at exit.     Pain end of session: 0/10    Plan:  OP PT Plan  Treatment/Interventions: Aquatic therapy, Dry needling, Electrical stimulation, Manual therapy, Neuromuscular re-education, Taping techniques, Therapeutic activities, Therapeutic exercises, Ultrasound  PT Plan: Skilled PT  PT Frequency: 2 times per week  Duration: 10 additional visits, 19 total  Certification Period Start Date: 01/29/25  Certification Period End Date: 04/29/25  Number of Treatments Authorized: [place]  Rehab Potential: Good  Plan of Care Agreement: Patient  Continue with current POC/no changes    Assessment of current progress against goals:  Progressing toward functional goals    Goals:  Active       PT Problem       STG, 5 visits (Met)       Start:  10/18/24    Expected End:  04/29/25    Resolved:  01/29/25    Pt will be independent in HEP to improve LE and core strength, mobility, and function.  Pt will report 50% reduction in pain with functional mobility such as standing, walking, and stairs.         Pt will increase strength in core and LEs by 1/2 MMT in all planes for improved performance of functional mobility.   (Progressing)       Start:  10/18/24    Expected End:  04/29/25            Pt will demonstrate no more than 25% limitation in lumbar AROM without pain in all planes for improved performance of ADLs.   (Progressing)       Start:  10/18/24    Expected End:  04/29/25            Pt will ambulate moderate community distances across all surfaces without deviation and without pain.  (Progressing)       Start:  10/18/24    Expected End:  04/29/25            Pt will stand for >30 minutes with UE support PRN without increase in pain for  improved tolerance to household tasks such as cooking.   (Progressing)       Start:  10/18/24    Expected End:  04/29/25            Pt will perform 5x sit to  11s without pain for improved functional strength and mobility.  (Progressing)       Start:  10/18/24    Expected End:  04/29/25

## 2025-02-26 ENCOUNTER — TREATMENT (OUTPATIENT)
Dept: PHYSICAL THERAPY | Facility: CLINIC | Age: 70
End: 2025-02-26
Payer: MEDICARE

## 2025-02-26 DIAGNOSIS — M48.062 SPINAL STENOSIS, LUMBAR REGION, WITH NEUROGENIC CLAUDICATION: ICD-10-CM

## 2025-02-26 DIAGNOSIS — M47.817 LUMBOSACRAL SPONDYLOSIS WITHOUT MYELOPATHY: ICD-10-CM

## 2025-02-26 DIAGNOSIS — G89.29 OTHER CHRONIC PAIN: ICD-10-CM

## 2025-02-26 PROCEDURE — 97113 AQUATIC THERAPY/EXERCISES: CPT | Mod: GP

## 2025-02-26 ASSESSMENT — PAIN - FUNCTIONAL ASSESSMENT: PAIN_FUNCTIONAL_ASSESSMENT: 0-10

## 2025-02-26 ASSESSMENT — PAIN SCALES - GENERAL: PAINLEVEL_OUTOF10: 2

## 2025-02-28 ENCOUNTER — APPOINTMENT (OUTPATIENT)
Dept: AUDIOLOGY | Facility: CLINIC | Age: 70
End: 2025-02-28
Payer: MEDICARE

## 2025-02-28 DIAGNOSIS — H90.3 ASYMMETRICAL SENSORINEURAL HEARING LOSS: ICD-10-CM

## 2025-02-28 DIAGNOSIS — R42 DIZZINESS: ICD-10-CM

## 2025-02-28 DIAGNOSIS — H90.A32 MIXED CONDUCTIVE AND SENSORINEURAL HEARING LOSS OF LEFT EAR WITH RESTRICTED HEARING OF RIGHT EAR: Primary | ICD-10-CM

## 2025-02-28 DIAGNOSIS — H69.92 DYSFUNCTION OF LEFT EUSTACHIAN TUBE: ICD-10-CM

## 2025-02-28 DIAGNOSIS — H90.A21 SENSORINEURAL HEARING LOSS (SNHL) OF RIGHT EAR WITH RESTRICTED HEARING OF LEFT EAR: ICD-10-CM

## 2025-02-28 PROCEDURE — 92550 TYMPANOMETRY & REFLEX THRESH: CPT | Performed by: AUDIOLOGIST

## 2025-02-28 PROCEDURE — 92557 COMPREHENSIVE HEARING TEST: CPT | Performed by: AUDIOLOGIST

## 2025-02-28 NOTE — PROGRESS NOTES
AUDIOLOGY ADULT AUDIOMETRIC EVALUATION    Name:  Dyana Irene  :  1955  Age:  69 y.o.  Date of Evaluation:  2025    Reason for visit: Ms. Irene is seen in the clinic today at the request of otolaryngology for an audiologic evaluation.     HISTORY  Patient complains of dizziness which has occurred a few times in the past year. She also notes a left hearing loss, occasional tinnitus with left worse than right and drainage from left ear in past. She has not worked in noise.    EVALUATION  See scanned audiogram: “Media” > “Audiology Report”.      RESULTS  Otoscopic Evaluation:  Right Ear: clear ear canal  Left Ear: clear ear canal    Immittance Measures:  Tympanometry:  Right Ear: Type A, normal tympanic membrane mobility with normal middle ear pressure   Left Ear:  Type C, normal tympanic membrane mobility with significantly negative middle ear pressure to Type B, reduced tympanic membrane mobility     Acoustic Reflexes:  Ipsilateral Right Ear: NR NR NR NR   Ipsilateral Left Ear:   NR NR NR NR   Contralateral Right Ear: did not evaluate  Contralateral Left Ear: did not evaluate    Distortion Product Otoacoustic Emissions (DPOAEs):  Right Ear: REFER 1-8 K HZ  Left Ear:   REFER 1-8 K HZ    Audiometry:  Test Technique and Reliability: BEHAVIORAL  Standard audiometry via supra-aural headphones. Reliability is fair to  good.    Pure tone air and bone conduction audiometry:  Right Ear: WITHIN NORMAL LIMITS TO 3 K HZ WITH A MILD MODERATE SNHL AT 4-8 K HZ.  Left Ear:  ASYMMETRIC SEVERE  MIXED LOSS 125- 8 K HZ.    Speech Audiometry (Word Recognition Scores):   Right Ear: 96% GOOD  Left Ear:   40% POOR    IMPRESSIONS  ASYMMETRIC LEFT MIXED LOSS WITH A RIGHT NORMAL MILD TO MODERATE SNHL.  POOR LEFT TM MOBILITY.  The presence of acoustic reflexes within normal intensity limits is consistent with normal middle ear and brainstem function, and suggests that auditory sensitivity is not significantly  impaired. An elevated or absent acoustic reflex threshold is consistent with a middle ear disorder, hearing loss in the stimulated ear, and/or interruption of neural innervation of the stapedius muscle. Present DPOAEs suggest normal/near normal cochlear outer hair cell function and are consistent with no greater than a mild hearing loss at those frequencies. Absent DPOAEs are consistent with abnormal cochlear outer hair cell function and some degree of hearing loss at those frequencies.    RECOMMENDATIONS  - Follow up with otolaryngology MARCH 4 2025 as scheduled. Asymmetric hearing loss, dizziness, tinnitus left and ETD. Special tests as needed.   - Audiologic evaluation as needed.  - Annual audiologic evaluation, sooner if an acute change is noted.  - Audiologic evaluation in conjunction with otologic care, if an acute change is noted, and/or annually.  - Consider amplification  or CI  after treatment for left hearing loss. Contact insurance to determine if there is an applicable benefit and where it can be used. Contact our office to schedule an appointment should she wish to proceed with hearing aids through our clinic.  - Follow-up with medical care team as planned.    PATIENT EDUCATION  Discussed results, impressions and recommendations with the patient. Questions were addressed and the patient was encouraged to contact our office should concerns arise.    Time for this encounter: 60 minutes     Kt Mcclain  Licensed Audiologist

## 2025-03-03 ENCOUNTER — APPOINTMENT (OUTPATIENT)
Dept: PHYSICAL THERAPY | Facility: CLINIC | Age: 70
End: 2025-03-03
Payer: MEDICARE

## 2025-03-04 ENCOUNTER — APPOINTMENT (OUTPATIENT)
Dept: OTOLARYNGOLOGY | Facility: CLINIC | Age: 70
End: 2025-03-04
Payer: MEDICARE

## 2025-03-04 ENCOUNTER — TELEPHONE (OUTPATIENT)
Dept: OTOLARYNGOLOGY | Facility: CLINIC | Age: 70
End: 2025-03-04

## 2025-03-04 VITALS — WEIGHT: 252 LBS | BODY MASS INDEX: 41.99 KG/M2 | TEMPERATURE: 96.8 F | HEIGHT: 65 IN

## 2025-03-04 DIAGNOSIS — Z00.00 HEALTH CARE MAINTENANCE: ICD-10-CM

## 2025-03-04 DIAGNOSIS — R42 DIZZINESS: Primary | ICD-10-CM

## 2025-03-04 DIAGNOSIS — R42 DIZZINESS: ICD-10-CM

## 2025-03-04 DIAGNOSIS — H69.93 DYSFUNCTION OF EUSTACHIAN TUBE, BILATERAL: ICD-10-CM

## 2025-03-04 DIAGNOSIS — H90.3 ASNHL (ASYMMETRICAL SENSORINEURAL HEARING LOSS): Primary | ICD-10-CM

## 2025-03-04 PROCEDURE — 1157F ADVNC CARE PLAN IN RCRD: CPT | Performed by: OTOLARYNGOLOGY

## 2025-03-04 PROCEDURE — 3008F BODY MASS INDEX DOCD: CPT | Performed by: OTOLARYNGOLOGY

## 2025-03-04 PROCEDURE — 1123F ACP DISCUSS/DSCN MKR DOCD: CPT | Performed by: OTOLARYNGOLOGY

## 2025-03-04 PROCEDURE — 1159F MED LIST DOCD IN RCRD: CPT | Performed by: OTOLARYNGOLOGY

## 2025-03-04 PROCEDURE — 99213 OFFICE O/P EST LOW 20 MIN: CPT | Performed by: OTOLARYNGOLOGY

## 2025-03-04 NOTE — TELEPHONE ENCOUNTER
Pt called and scheduled her MRI for next week She said she needs something called to relax her for the MRI. Without something called in, she will not be able to do the MRI.

## 2025-03-04 NOTE — PROGRESS NOTES
HPI  Dyana Irene is a 69 y.o. female long history of eustachian tube dysfunction.  Presents with progressively increased dizziness and tinnitus.  Mostly left-sided.  Denies otalgia and otorrhea.  She feels more hearing loss on the left-hand side.  Audiogram February 28 with left mixed loss.  Right high-frequency downsloping sensorineural hearing loss.  Her mixed loss is considerably worse than from an audiogram 2023.      Past Medical History:   Diagnosis Date    Abnormal electrocardiography 12/30/2022    Allergic rhinitis 02/13/2024    [place]    Anxiety 09/06/2023    Aortic valve sclerosis 09/06/2023    Arthropathy of hand 02/13/2024    Atrophic emphysema (Multi) 09/06/2023    Benign essential hypertension 09/06/2023    Bilateral carotid artery stenosis 10/24/2022    Breast cyst 1972    Chronic bilateral low back pain with bilateral sciatica 11/06/2023    Chronic pain disorder     Cigarette smoker 02/13/2024    Coronary artery disease involving native coronary artery without angina pectoris 09/06/2023    COVID-19 11/04/2021    Depressive disorder 02/13/2024    Disc degeneration, lumbar 09/06/2023    Displaced fracture of proximal phalanx of unspecified finger, initial encounter for closed fracture 11/25/2019    Diverticulosis 09/06/2023    Elevated coronary artery calcium score 09/06/2023    CT Coronary artery calcium score (6/17): 736      Enlarged liver 09/06/2023    Fibrocystic breast 1972    Fibromyalgia 09/06/2023    Fibromyalgia, primary     Hearing loss 02/13/2024    History of deep vein thrombosis 09/06/2023    History of depression 02/13/2024    Impaired fasting glucose 09/06/2023    Insomnia 09/06/2023    Joint pain     Kidney cyst, acquired 09/06/2023    Lumbosacral disc disease     Lumbosacral spondylosis without myelopathy 09/06/2023    Lung nodules 09/06/2023    Mitral valve regurgitation 09/06/2023    Mitral valve regurgitation:  mild-mod (4/22), mild (4/24)  LVEF 60-65% (4/24)      Mixed  hyperlipidemia 09/06/2023    Mixed stress and urge urinary incontinence 09/06/2023    Neuralgia and neuritis, unspecified 02/07/2017    Neuralgia    Non-pressure chronic ulcer of skin of other sites limited to breakdown of skin 02/04/2019    Obstructive sleep apnea 09/06/2023    Osteoarthritis of right knee 02/13/2024    Other chronic pain 10/18/2024    [facility]uc[facility]    Other microscopic hematuria 07/20/2018    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    Pelvic and perineal pain 07/25/2019    Persistent cough 02/13/2024    [place]    Personal history of other diseases of the digestive system 09/27/2016    History of diverticulitis of colon    Personal history of other endocrine, nutritional and metabolic disease 02/04/2019    History of vitamin D deficiency    Personal history of pulmonary embolism 04/06/2022    Pes anserinus bursitis of right knee 02/13/2024    Phlebitis and thrombophlebitis of unspecified site 05/21/2016    Superficial thrombophlebitis    Prediabetes 12/31/2024    Primary osteoarthritis, unspecified hand 04/26/2021    Arthritis of carpometacarpal joint    Sensorineural hearing loss, bilateral 01/19/2023    Severe obesity (Multi) 02/13/2024    Solitary pulmonary nodule 05/10/2021    Spinal stenosis, lumbar region, with neurogenic claudication 06/25/2021    Squamous cell carcinoma of skin of lower limb, including hip 04/12/2023    Tobacco dependence due to cigarettes 02/13/2024    Urethral disorder, unspecified 07/20/2018    Zoster without complications 02/04/2019            Medications:     Current Outpatient Medications:     ascorbic acid (Vitamin C) 1,000 mg tablet, Take 1 tablet (1,000 mg) by mouth once daily., Disp: , Rfl:     aspirin 81 mg EC tablet, 1 tablet Orally Once a day, Disp: , Rfl:     atorvastatin (Lipitor) 80 mg tablet, Take 1 tablet (80 mg) by mouth once daily., Disp: 90 tablet, Rfl: 3    chlorthalidone (Hygroton) 25 mg tablet, Take 1 tablet (25 mg) by mouth once daily.,  "Disp: 90 tablet, Rfl: 3    cholecalciferol (Vitamin D-3) 50 MCG (2000 UT) tablet, 1 tablet Orally Once a day, Disp: , Rfl:     DULoxetine (Cymbalta) 30 mg DR capsule, Take 1 capsule (30 mg) by mouth once daily. Do not crush or chew. Take with 60 mg cymbalta at home, Disp: 90 capsule, Rfl: 1    DULoxetine (Cymbalta) 60 mg DR capsule, TAKE 1 CAPSULE BY MOUTH EVERY DAY, Disp: 90 capsule, Rfl: 0    ezetimibe (Zetia) 10 mg tablet, Take 1 tablet (10 mg) by mouth once daily., Disp: 90 tablet, Rfl: 3    losartan (Cozaar) 100 mg tablet, TAKE 1 TABLET BY MOUTH EVERY DAY, Disp: 90 tablet, Rfl: 1    metoprolol succinate XL (Toprol-XL) 25 mg 24 hr tablet, TAKE 1 TABLET BY MOUTH EVERY DAY, Disp: 90 tablet, Rfl: 3    multivitamin-min-iron-FA-vit K (Adults Multivitamin) 18 mg iron-400 mcg-25 mcg tablet, TAKE 1 TABLET DAILY., Disp: , Rfl:     potassium chloride CR 20 mEq ER tablet, Take 1 tablet (20 mEq) by mouth 2 times a day., Disp: 180 tablet, Rfl: 3    topiramate (Topamax) 100 mg tablet, 1 tablet Orally every 12 hours, Disp: , Rfl:     MULTIVITAMIN ORAL, 1 tablet Orally Once a day (Patient not taking: Reported on 3/4/2025), Disp: , Rfl:     traMADol (Ultram) 50 mg tablet, Take 1 tablet (50 mg) by mouth., Disp: , Rfl:     varenicline (Chantix KARIN) 0.5 mg (11)- 1 mg (42) tablet, Use as directed on package instructions, try to quit smoking after 1 week., Disp: 1 each, Rfl: 0     Allergies:  Allergies   Allergen Reactions    Hydromorphone Other     Shallow Breathing    Nitrofurantoin Macrocrystal Itching and GI Upset    Valdecoxib Itching    Nirmatrelvir-Ritonavir Unknown        Physical Exam:  Last Recorded Vitals  Temperature 36 °C (96.8 °F), height 1.638 m (5' 4.5\"), weight 114 kg (252 lb).  General:     General appearance: Well-developed, well-nourished in no acute distress.       Voice:  normal       Head/face: Normal appearance; nontender to palpation     Facial nerve function: Normal and symmetric " bilaterally.    Oral/oropharynx:     Oral vestibule: Normal labial and gingival mucosa     Tongue/floor of mouth: Normal without lesion     Oropharynx: Clear.  No lesions present of the hard/soft palate, posterior pharynx    Neck:     Neck: Normal appearance, trachea midline     Salivary glands: Normal to palpation bilaterally     Lymph nodes: No cervical lymphadenopathy to palpation     Thyroid: No thyromegaly.  No palpable nodules     Range of motion: Normal    Neurological:     Cortical functions: Alert and oriented x3, appropriate affect       Larynx/hypopharynx:     Laryngeal findings: Mirror exam inadequate or limited secondary to enlarged base of tongue and/or excessive gagging    Ear:     Ear canal: Normal bilaterally     Tympanic membrane: Intact and mobile Right, middle ear aerated.  Left retracted to the promontory.  No inflammation or effusion.     Pinna: Normal bilaterally     Hearing:  Gross hearing assessment normal by voice    Nose:     Visualized using: Anterior rhinoscopy     Nasopharynx: Inadequate mirror exam secondary to gag, anatomy.       Nasal dorsum: Nontraumatic midline appearance     Septum: Midline     Inferior turbinates: Normally sized     Mucosa: Bilateral, pink, normal appearing       ASSESSMENT/PLAN:  Her eustachian tube dysfunction and retraction on the left appears stable but given her progression of hearing loss and dizziness, I have recommended VNG and MRI of the brain and IAC.  I will see her back after.  Fall precautions in the meantime.        Zach Witt MD

## 2025-03-05 ENCOUNTER — HOSPITAL ENCOUNTER (EMERGENCY)
Facility: HOSPITAL | Age: 70
Discharge: HOME | End: 2025-03-05
Attending: EMERGENCY MEDICINE
Payer: MEDICARE

## 2025-03-05 ENCOUNTER — APPOINTMENT (OUTPATIENT)
Dept: RADIOLOGY | Facility: HOSPITAL | Age: 70
End: 2025-03-05
Payer: MEDICARE

## 2025-03-05 ENCOUNTER — OFFICE VISIT (OUTPATIENT)
Dept: PAIN MEDICINE | Facility: CLINIC | Age: 70
End: 2025-03-05
Payer: MEDICARE

## 2025-03-05 ENCOUNTER — APPOINTMENT (OUTPATIENT)
Dept: PHYSICAL THERAPY | Facility: CLINIC | Age: 70
End: 2025-03-05
Payer: MEDICARE

## 2025-03-05 ENCOUNTER — APPOINTMENT (OUTPATIENT)
Dept: CARDIOLOGY | Facility: HOSPITAL | Age: 70
End: 2025-03-05
Payer: MEDICARE

## 2025-03-05 ENCOUNTER — TELEPHONE (OUTPATIENT)
Dept: PAIN MEDICINE | Facility: CLINIC | Age: 70
End: 2025-03-05

## 2025-03-05 VITALS
RESPIRATION RATE: 15 BRPM | BODY MASS INDEX: 41.65 KG/M2 | WEIGHT: 250 LBS | HEIGHT: 65 IN | DIASTOLIC BLOOD PRESSURE: 77 MMHG | TEMPERATURE: 97.9 F | OXYGEN SATURATION: 96 % | HEART RATE: 78 BPM | SYSTOLIC BLOOD PRESSURE: 132 MMHG

## 2025-03-05 VITALS
WEIGHT: 252 LBS | BODY MASS INDEX: 41.99 KG/M2 | HEART RATE: 90 BPM | SYSTOLIC BLOOD PRESSURE: 140 MMHG | RESPIRATION RATE: 18 BRPM | HEIGHT: 65 IN | OXYGEN SATURATION: 96 % | DIASTOLIC BLOOD PRESSURE: 82 MMHG

## 2025-03-05 DIAGNOSIS — M17.11 PRIMARY OSTEOARTHRITIS OF RIGHT KNEE: ICD-10-CM

## 2025-03-05 DIAGNOSIS — R42 VERTIGO: Primary | ICD-10-CM

## 2025-03-05 DIAGNOSIS — R42 VERTIGO: ICD-10-CM

## 2025-03-05 DIAGNOSIS — M46.1 SI JOINT ARTHRITIS: Primary | ICD-10-CM

## 2025-03-05 LAB
ALBUMIN SERPL BCP-MCNC: 3.5 G/DL (ref 3.4–5)
ALP SERPL-CCNC: 51 U/L (ref 33–136)
ALT SERPL W P-5'-P-CCNC: 20 U/L (ref 7–45)
ANION GAP SERPL CALCULATED.3IONS-SCNC: 12 MMOL/L (ref 10–20)
AST SERPL W P-5'-P-CCNC: 17 U/L (ref 9–39)
BASOPHILS # BLD AUTO: 0.04 X10*3/UL (ref 0–0.1)
BASOPHILS NFR BLD AUTO: 0.5 %
BILIRUB SERPL-MCNC: 0.6 MG/DL (ref 0–1.2)
BNP SERPL-MCNC: 56 PG/ML (ref 0–99)
BUN SERPL-MCNC: 22 MG/DL (ref 6–23)
CALCIUM SERPL-MCNC: 9 MG/DL (ref 8.6–10.3)
CARDIAC TROPONIN I PNL SERPL HS: 7 NG/L (ref 0–13)
CARDIAC TROPONIN I PNL SERPL HS: 8 NG/L (ref 0–13)
CHLORIDE SERPL-SCNC: 106 MMOL/L (ref 98–107)
CO2 SERPL-SCNC: 25 MMOL/L (ref 21–32)
CREAT SERPL-MCNC: 0.92 MG/DL (ref 0.5–1.05)
EGFRCR SERPLBLD CKD-EPI 2021: 67 ML/MIN/1.73M*2
EOSINOPHIL # BLD AUTO: 0.13 X10*3/UL (ref 0–0.7)
EOSINOPHIL NFR BLD AUTO: 1.5 %
ERYTHROCYTE [DISTWIDTH] IN BLOOD BY AUTOMATED COUNT: 14.5 % (ref 11.5–14.5)
FLUAV RNA RESP QL NAA+PROBE: NOT DETECTED
FLUBV RNA RESP QL NAA+PROBE: NOT DETECTED
GLUCOSE SERPL-MCNC: 114 MG/DL (ref 74–99)
HCT VFR BLD AUTO: 45.6 % (ref 36–46)
HGB BLD-MCNC: 15.3 G/DL (ref 12–16)
IMM GRANULOCYTES # BLD AUTO: 0.04 X10*3/UL (ref 0–0.7)
IMM GRANULOCYTES NFR BLD AUTO: 0.5 % (ref 0–0.9)
LYMPHOCYTES # BLD AUTO: 1.83 X10*3/UL (ref 1.2–4.8)
LYMPHOCYTES NFR BLD AUTO: 20.9 %
MCH RBC QN AUTO: 30.5 PG (ref 26–34)
MCHC RBC AUTO-ENTMCNC: 33.6 G/DL (ref 32–36)
MCV RBC AUTO: 91 FL (ref 80–100)
MONOCYTES # BLD AUTO: 0.56 X10*3/UL (ref 0.1–1)
MONOCYTES NFR BLD AUTO: 6.4 %
NEUTROPHILS # BLD AUTO: 6.14 X10*3/UL (ref 1.2–7.7)
NEUTROPHILS NFR BLD AUTO: 70.2 %
NRBC BLD-RTO: 0 /100 WBCS (ref 0–0)
PLATELET # BLD AUTO: 167 X10*3/UL (ref 150–450)
POTASSIUM SERPL-SCNC: 3.3 MMOL/L (ref 3.5–5.3)
PROT SERPL-MCNC: 6.3 G/DL (ref 6.4–8.2)
RBC # BLD AUTO: 5.01 X10*6/UL (ref 4–5.2)
SARS-COV-2 RNA RESP QL NAA+PROBE: NOT DETECTED
SODIUM SERPL-SCNC: 140 MMOL/L (ref 136–145)
WBC # BLD AUTO: 8.7 X10*3/UL (ref 4.4–11.3)

## 2025-03-05 PROCEDURE — 1159F MED LIST DOCD IN RCRD: CPT | Performed by: PHYSICIAN ASSISTANT

## 2025-03-05 PROCEDURE — 93005 ELECTROCARDIOGRAM TRACING: CPT

## 2025-03-05 PROCEDURE — 2500000002 HC RX 250 W HCPCS SELF ADMINISTERED DRUGS (ALT 637 FOR MEDICARE OP, ALT 636 FOR OP/ED): Performed by: PHYSICIAN ASSISTANT

## 2025-03-05 PROCEDURE — 84484 ASSAY OF TROPONIN QUANT: CPT | Performed by: EMERGENCY MEDICINE

## 2025-03-05 PROCEDURE — 99285 EMERGENCY DEPT VISIT HI MDM: CPT | Mod: 25 | Performed by: EMERGENCY MEDICINE

## 2025-03-05 PROCEDURE — 3079F DIAST BP 80-89 MM HG: CPT | Performed by: PHYSICIAN ASSISTANT

## 2025-03-05 PROCEDURE — 3008F BODY MASS INDEX DOCD: CPT | Performed by: PHYSICIAN ASSISTANT

## 2025-03-05 PROCEDURE — 36415 COLL VENOUS BLD VENIPUNCTURE: CPT | Performed by: EMERGENCY MEDICINE

## 2025-03-05 PROCEDURE — 1125F AMNT PAIN NOTED PAIN PRSNT: CPT | Performed by: PHYSICIAN ASSISTANT

## 2025-03-05 PROCEDURE — 80053 COMPREHEN METABOLIC PANEL: CPT | Performed by: EMERGENCY MEDICINE

## 2025-03-05 PROCEDURE — 71045 X-RAY EXAM CHEST 1 VIEW: CPT | Performed by: RADIOLOGY

## 2025-03-05 PROCEDURE — 83880 ASSAY OF NATRIURETIC PEPTIDE: CPT | Performed by: EMERGENCY MEDICINE

## 2025-03-05 PROCEDURE — 1160F RVW MEDS BY RX/DR IN RCRD: CPT | Performed by: PHYSICIAN ASSISTANT

## 2025-03-05 PROCEDURE — 71045 X-RAY EXAM CHEST 1 VIEW: CPT

## 2025-03-05 PROCEDURE — 3077F SYST BP >= 140 MM HG: CPT | Performed by: PHYSICIAN ASSISTANT

## 2025-03-05 PROCEDURE — 85025 COMPLETE CBC W/AUTO DIFF WBC: CPT | Performed by: EMERGENCY MEDICINE

## 2025-03-05 PROCEDURE — 70450 CT HEAD/BRAIN W/O DYE: CPT

## 2025-03-05 PROCEDURE — 99214 OFFICE O/P EST MOD 30 MIN: CPT | Performed by: PHYSICIAN ASSISTANT

## 2025-03-05 PROCEDURE — G2211 COMPLEX E/M VISIT ADD ON: HCPCS | Performed by: PHYSICIAN ASSISTANT

## 2025-03-05 PROCEDURE — 87636 SARSCOV2 & INF A&B AMP PRB: CPT | Performed by: EMERGENCY MEDICINE

## 2025-03-05 PROCEDURE — 2500000001 HC RX 250 WO HCPCS SELF ADMINISTERED DRUGS (ALT 637 FOR MEDICARE OP): Performed by: PHYSICIAN ASSISTANT

## 2025-03-05 PROCEDURE — 1157F ADVNC CARE PLAN IN RCRD: CPT | Performed by: PHYSICIAN ASSISTANT

## 2025-03-05 PROCEDURE — 1123F ACP DISCUSS/DSCN MKR DOCD: CPT | Performed by: PHYSICIAN ASSISTANT

## 2025-03-05 RX ORDER — FLUTICASONE PROPIONATE 50 MCG
2 SPRAY, SUSPENSION (ML) NASAL DAILY
Qty: 16 G | Refills: 0 | Status: SHIPPED | OUTPATIENT
Start: 2025-03-05 | End: 2025-04-04

## 2025-03-05 RX ORDER — POTASSIUM CHLORIDE 1.5 G/1.58G
20 POWDER, FOR SOLUTION ORAL ONCE
Status: COMPLETED | OUTPATIENT
Start: 2025-03-05 | End: 2025-03-05

## 2025-03-05 RX ORDER — DIAZEPAM 2 MG/1
TABLET ORAL
Qty: 2 TABLET | Refills: 0 | Status: SHIPPED | OUTPATIENT
Start: 2025-03-05

## 2025-03-05 RX ORDER — MECLIZINE HYDROCHLORIDE 25 MG/1
25 TABLET ORAL 3 TIMES DAILY PRN
Qty: 30 TABLET | Refills: 0 | Status: SHIPPED | OUTPATIENT
Start: 2025-03-05

## 2025-03-05 RX ORDER — ONDANSETRON 4 MG/1
4 TABLET, FILM COATED ORAL EVERY 6 HOURS PRN
Qty: 12 TABLET | Refills: 0 | Status: SHIPPED | OUTPATIENT
Start: 2025-03-05

## 2025-03-05 RX ORDER — PREDNISONE 20 MG/1
TABLET ORAL
Qty: 18 TABLET | Refills: 0 | Status: SHIPPED | OUTPATIENT
Start: 2025-03-05 | End: 2025-03-20

## 2025-03-05 RX ORDER — MECLIZINE HYDROCHLORIDE 25 MG/1
50 TABLET ORAL ONCE
Status: COMPLETED | OUTPATIENT
Start: 2025-03-05 | End: 2025-03-05

## 2025-03-05 RX ADMIN — MECLIZINE HYDROCHLORIDE 50 MG: 25 TABLET ORAL at 13:54

## 2025-03-05 RX ADMIN — POTASSIUM CHLORIDE 20 MEQ: 1.5 POWDER, FOR SOLUTION ORAL at 13:54

## 2025-03-05 ASSESSMENT — LIFESTYLE VARIABLES
HOW OFTEN DO YOU HAVE SIX OR MORE DRINKS ON ONE OCCASION: NEVER
AUDIT-C TOTAL SCORE: 0
HOW MANY STANDARD DRINKS CONTAINING ALCOHOL DO YOU HAVE ON A TYPICAL DAY: PATIENT DOES NOT DRINK
HOW OFTEN DO YOU HAVE A DRINK CONTAINING ALCOHOL: NEVER
SKIP TO QUESTIONS 9-10: 1

## 2025-03-05 ASSESSMENT — ENCOUNTER SYMPTOMS
CONSTITUTIONAL NEGATIVE: 1
HEMATOLOGIC/LYMPHATIC NEGATIVE: 1
ALLERGIC/IMMUNOLOGIC NEGATIVE: 1
CARDIOVASCULAR NEGATIVE: 1
DIZZINESS: 1
NAUSEA: 1
PSYCHIATRIC NEGATIVE: 1
RESPIRATORY NEGATIVE: 1
BACK PAIN: 1
ARTHRALGIAS: 1
ENDOCRINE NEGATIVE: 1

## 2025-03-05 ASSESSMENT — PAIN - FUNCTIONAL ASSESSMENT
PAIN_FUNCTIONAL_ASSESSMENT: 0-10
PAIN_FUNCTIONAL_ASSESSMENT: 0-10

## 2025-03-05 ASSESSMENT — PAIN SCALES - GENERAL
PAINLEVEL_OUTOF10: 1
PAINLEVEL_OUTOF10: 0 - NO PAIN
PAINLEVEL_OUTOF10: 1

## 2025-03-05 ASSESSMENT — PAIN DESCRIPTION - DESCRIPTORS: DESCRIPTORS: ACHING

## 2025-03-05 NOTE — ED TRIAGE NOTES
Dizziness x 1 month, intermittent, cinci neg per ems, Aox4, no falls/injuries, denies cp/dyspnea.

## 2025-03-05 NOTE — TELEPHONE ENCOUNTER
Patient called 911 from the office at 11:18am due to feeling dizziness, nausea for transport to ER. Denied weakness or change in vision. Vital signs completed 118/87 p-75-resp-18 pulse ox 97%. EMS squad arrived at 11:26am to transport patient to ER>

## 2025-03-05 NOTE — DISCHARGE INSTRUCTIONS
Please follow-up with your primary care doctor and ENT.  Please take prednisone taper as prescribed and use Flonase daily for at least 30 days.  I do recommend using meclizine and Zofran as needed for nausea and dizziness.  The meclizine will make you drowsy do not take it and drive or operate machinery.  If you start to have difficulty walking, double vision return to the emergency department for reevaluation.

## 2025-03-05 NOTE — PROGRESS NOTES
Subjective   Patient ID: Dyana Irene is a 69 y.o. female who presents for Back Pain.  Is a 69-year-old female here today for follow-up after her SI joint injection.  He is reporting a 1 out of 10 in this regional pain.  Patient did notes that she has been following with an ENT due to vertigo issues.  She had an evaluation did not seem to provide anything of substance and there is additional test as ordered.  Patient did notes that she has experienced the vertigo and she is unable to ambulate having difficulty focusing on things as the vision is being disturbed.  Patient did notes that she did not eat this morning but she does not feel hungry nor shaky.  Feels that she wants to go to the EMS.  She is also requesting a refill of her Plavix for ambulatory difficulties        Review of Systems   Constitutional: Negative.    HENT: Negative.     Eyes:  Positive for visual disturbance.   Respiratory: Negative.     Cardiovascular: Negative.    Gastrointestinal:  Positive for nausea.   Endocrine: Negative.    Genitourinary: Negative.    Musculoskeletal:  Positive for arthralgias, back pain and gait problem.   Skin: Negative.    Allergic/Immunologic: Negative.    Neurological:  Positive for dizziness.   Hematological: Negative.    Psychiatric/Behavioral: Negative.         Objective   Physical Exam  Eyes:      Comments: Patient having difficulty with maintaining eye contact.   Musculoskeletal:      Comments: Patient is in a wheelchair unable to stand         Assessment/Plan   Problem List Items Addressed This Visit             ICD-10-CM    Osteoarthritis of right knee M17.11    Relevant Orders    Disability Placard     Other Visit Diagnoses         Codes    Vertigo    -  Primary R42    SI joint arthritis     M46.1        Successful injection of the SI joints.  Patient's vertigo issues and inability to stand we are going to be sending her to the emergency department EMS called patient successfully transported to [facility]  [facility].         Kelvin Sanabria PA-C 03/05/25 11:20 AM

## 2025-03-05 NOTE — TELEPHONE ENCOUNTER
Pt was informed   19 @ 09:32    HPI:  27 yo  at 39w3d with SUNSHINE 19 dated by LMP c/w first trimester sonogram, presents to labor and delivery for contractions, that have been increasing in intensity and frequency. Denies LOF or vaginal bleeding. Good fetal movements. Has history of asthma on albuterol inhaler PRN, hasn't used it in 2 years, never hospitalized or intubated. Denies any complications in this pregnancy. GBS negative (2019 10:55)      PAST MEDICAL & SURGICAL HISTORY:  Mild asthma with acute exacerbation, unspecified whether persistent: no hospitalization/intubation, occasionally in the summer uses rescue therapy  H/O dilation and curettage: two occurrences      POST PARTUM COURSE: P3 s/p , PPD#2, with mild intermittent asthma, w/ gHTN, BPs now well controlled, asymptomatic, postpartum course otherwise uncomplicated.  To be discharged home on PPD#2 in stable condition with VNS.            LABS:                        10.6   8.12  )-----------( 428      ( 2019 06:41 )             34.4                         11.9   7.41  )-----------( 534      ( 2019 10:52 )             37.4       19 @ 12:52      137  |  102  |  12  ----------------------------<  87  4.6   |  16<L>  |  0.9        Ca    9.2      2019 12:52    TPro  7.6  /  Alb  3.5  /  TBili  0.2  /  DBili  x   /  AST  26  /  ALT  23  /  AlkPhos  362<H>  19 @ 12:52    Urine pr/cr 0.1  Uric acid 5.9    Syphilis nonreactive  HIV nonreacitve    Allergies    aspirin (Other)

## 2025-03-05 NOTE — ED PROVIDER NOTES
HPI   Chief Complaint   Patient presents with    Dizziness     Dizziness x 1 month. With increase headaches.        HPI  This is a 69-year-old female presenting to the emergency department for evaluation of intermittent dizziness that has been ongoing for the last month.  Patient states that the symptoms usually come on abruptly.  She has noticed change in position because symptoms to come on.  She states the symptoms last for about 30 minutes but over the last couple days dizziness has been lasting for about an hour which prompted her to come in today for evaluation.  Patient did see ENT who ordered outpatient MRI.  Patient denies any diplopia, nausea, vomiting, difficulty speaking, weakness or numbness in her upper or lower extremities.  Patient states that she does have history of chronic nasal congestion.    Please see HPI for pertinent positive and negative ROS.       Patient History   Past Medical History:   Diagnosis Date    Abnormal electrocardiography 12/30/2022    Allergic rhinitis 02/13/2024    [facility]    Anxiety 09/06/2023    Aortic valve sclerosis 09/06/2023    Arthropathy of hand 02/13/2024    Atrophic emphysema (Multi) 09/06/2023    Benign essential hypertension 09/06/2023    Bilateral carotid artery stenosis 10/24/2022    Breast cyst 1972    Chronic bilateral low back pain with bilateral sciatica 11/06/2023    Chronic pain disorder     Cigarette smoker 02/13/2024    Coronary artery disease involving native coronary artery without angina pectoris 09/06/2023    COVID-19 11/04/2021    Depressive disorder 02/13/2024    Disc degeneration, lumbar 09/06/2023    Displaced fracture of proximal phalanx of unspecified finger, initial encounter for closed fracture 11/25/2019    Diverticulosis 09/06/2023    Elevated coronary artery calcium score 09/06/2023    CT Coronary artery calcium score (6/17): 736      Enlarged liver 09/06/2023    Fibrocystic breast 1972    Fibromyalgia 09/06/2023    Fibromyalgia, primary      Hearing loss 02/13/2024    History of deep vein thrombosis 09/06/2023    History of depression 02/13/2024    Impaired fasting glucose 09/06/2023    Insomnia 09/06/2023    Joint pain     Kidney cyst, acquired 09/06/2023    Lumbosacral disc disease     Lumbosacral spondylosis without myelopathy 09/06/2023    Lung nodules 09/06/2023    Mitral valve regurgitation 09/06/2023    Mitral valve regurgitation:  mild-mod (4/22), mild (4/24)  LVEF 60-65% (4/24)      Mixed hyperlipidemia 09/06/2023    Mixed stress and urge urinary incontinence 09/06/2023    Neuralgia and neuritis, unspecified 02/07/2017    Neuralgia    Non-pressure chronic ulcer of skin of other sites limited to breakdown of skin 02/04/2019    Obstructive sleep apnea 09/06/2023    Osteoarthritis of right knee 02/13/2024    Other chronic pain 10/18/2024    Vucetic    Other microscopic hematuria 07/20/2018    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    Pelvic and perineal pain 07/25/2019    Persistent cough 02/13/2024    [place]    Personal history of other diseases of the digestive system 09/27/2016    History of diverticulitis of colon    Personal history of other endocrine, nutritional and metabolic disease 02/04/2019    History of vitamin D deficiency    Personal history of pulmonary embolism 04/06/2022    Pes anserinus bursitis of right knee 02/13/2024    Phlebitis and thrombophlebitis of unspecified site 05/21/2016    Superficial thrombophlebitis    Prediabetes 12/31/2024    Primary osteoarthritis, unspecified hand 04/26/2021    Arthritis of carpometacarpal joint    Sensorineural hearing loss, bilateral 01/19/2023    Severe obesity (Multi) 02/13/2024    Solitary pulmonary nodule 05/10/2021    Spinal stenosis, lumbar region, with neurogenic claudication 06/25/2021    Squamous cell carcinoma of skin of lower limb, including hip 04/12/2023    Tobacco dependence due to cigarettes 02/13/2024    Urethral disorder, unspecified 07/20/2018    Zoster  without complications 02/04/2019     Past Surgical History:   Procedure Laterality Date    CARPAL TUNNEL RELEASE      CHOLECYSTECTOMY      CT ANGIO NECK  04/26/2023    CT NECK ANGIO W AND WO IV CONTRAST [id number] CT    CT HEAD ANGIO W AND WO IV CONTRAST  04/26/2023    CT HEAD ANGIO W AND WO IV CONTRAST [id number] CT    EPIDURAL BLOCK INJECTION      HYSTERECTOMY      JOINT REPLACEMENT      OTHER SURGICAL HISTORY  11/16/2022    Mohs surgery    TOTAL KNEE ARTHROPLASTY       Family History   Problem Relation Name Age of Onset    Breast cancer Mother Mirta 35    Cancer Mother Mirta     Other (other problem) Father          Old Age     Social History     Tobacco Use    Smoking status: Every Day     Current packs/day: 0.25     Average packs/day: 0.6 packs/day for 72.0 years (41.1 ttl pk-yrs)     Types: Cigarettes     Start date: 1/1/1984     Passive exposure: Current    Smokeless tobacco: Never   Vaping Use    Vaping status: Never Used   Substance Use Topics    Alcohol use: Yes     Comment: Occassional    Drug use: Not Currently     Types: Marijuana       Physical Exam   ED Triage Vitals [03/05/25 1155]   Temperature Heart Rate Respirations BP   36.6 °C (97.9 °F) 78 15 132/77      Pulse Ox Temp Source Heart Rate Source Patient Position   96 % Temporal Monitor Lying      BP Location FiO2 (%)     Right arm --       Physical Exam  GENERAL APPEARANCE: Awake and alert. No acute respiratory distress.   VITAL SIGNS: As per the nurses' triage record.  HEENT: Normocephalic, atraumatic. Extraocular muscles are intact.  Fatigable horizontal nystagmus to the left.  Negative test of skew bilaterally.  Conjunctiva are pink. Negative scleral icterus. Mucous membranes are moist. Tongue in the midline. Oropharynx clear, uvula midline.   NECK: Soft, nontender and supple, full gross ROM, no meningeal signs.  CHEST: Nontender to palpation. Clear to auscultation bilaterally. No rales, rhonchi, or wheezing. Symmetric rise and fall of chest  wall.   HEART: Clear S1 and S2. Regular rate and rhythm. No murmurs appreciated on auscultation.  Strong and equal pulses in the extremities.  ABDOMEN: Soft, nontender, nondistended  MUSCULOSKELETAL: The calves are nontender to palpation. Full gross active range of motion. Ambulating on own with no acute difficulties, no ataxic gait.   NEUROLOGICAL: Awake, alert and oriented x 3. Motor power intact in the upper and lower extremities. Sensation is intact to light touch in the upper and lower extremities. Patient answering questions appropriately.  NIH stroke scale 0.  IMMUNOLOGICAL: No lymphatic streaking noted  DERMATOLOGIC: Warm and dry without petechiae, rashes, or ecchymosis noted on visible skin.   PYSCH: Cooperative with appropriate mood and affect.    ED Course & MDM   ED Course as of 03/05/25 1419   Wed Mar 05, 2025   1153 EKG on my independent interpretation: Sinus rhythm 81 bpm, normal axis, normal intervals, no acute ST or T wave abnormalities, frequent PVCs [CHANTEL]      ED Course User Index  [CHANTEL] Mary Killian MD         Diagnoses as of 03/05/25 1419   Vertigo                 No data recorded     Bennie Coma Scale Score: 15 (03/05/25 1204 : Mary Lou Lawson, MARY ELLEN)                           Medical Decision Making  Parts of this chart have been completed using voice recognition software. Please excuse any errors of transcription.  My thought process and reason for plan has been formulated from the time that I saw the patient until the time of disposition and is not specific to one specific moment during their visit and furthermore my MDM encompasses this entire chart and not only this text box.      HPI: Detailed above.    Exam: A medically appropriate exam performed, outlined above, given the known history and presentation.    History obtained from: Patient    EKG: See my supervising physician's EKG interpretation    Medications given during visit:  Medications   potassium chloride (Klor-Con) packet 20  mEq (20 mEq oral Given 3/5/25 1354)   meclizine (Antivert) tablet 50 mg (50 mg oral Given 3/5/25 1354)        Diagnostic/tests  Labs Reviewed   COMPREHENSIVE METABOLIC PANEL - Abnormal       Result Value    Glucose 114 (*)     Sodium 140      Potassium 3.3 (*)     Chloride 106      Bicarbonate 25      Anion Gap 12      Urea Nitrogen 22      Creatinine 0.92      eGFR 67      Calcium 9.0      Albumin 3.5      Alkaline Phosphatase 51      Total Protein 6.3 (*)     AST 17      Bilirubin, Total 0.6      ALT 20     B-TYPE NATRIURETIC PEPTIDE - Normal    BNP 56      Narrative:        <100 pg/mL - Heart failure unlikely  100-299 pg/mL - Intermediate probability of acute heart                  failure exacerbation. Correlate with clinical                  context and patient history.    >=300 pg/mL - Heart Failure likely. Correlate with clinical                  context and patient history.    BNP testing is performed using different testing methodology at [facility] than at other [facility]. Direct result comparisons should only be made within the same method.      SARS-COV-2 AND INFLUENZA A/B PCR - Normal    Flu A Result Not Detected      Flu B Result Not Detected      Coronavirus 2019, PCR Not Detected      Narrative:     This assay is an FDA-cleared, in vitro diagnostic nucleic acid amplification test for the qualitative detection and differentiation of SARS CoV-2/ Influenza A/B from nasopharyngeal specimens collected from individuals with signs and symptoms of respiratory tract infections, and has been validated for use at [facility]. Negative results do not preclude COVID-19/ Influenza A/B infections and should not be used as the sole basis for diagnosis, treatment, or other management decisions. Testing for SARS CoV-2 is recommended only for patients who meet current clinical and/or epidemiological criteria defined by federal, state, or local public health directives.    SERIAL TROPONIN-INITIAL - Normal    Troponin I, High Sensitivity 8      Narrative:     Less than 99th percentile of normal range cutoff-  Female and children under 18 years old <14 ng/L; Male <21 ng/L: Negative  Repeat testing should be performed if clinically indicated.     Female and children under 18 years old 14-50 ng/L; Male 21-50 ng/L:  Consistent with possible cardiac damage and possible increased clinical   risk. Serial measurements may help to assess extent of myocardial damage.     >50 ng/L: Consistent with cardiac damage, increased clinical risk and  myocardial infarction. Serial measurements may help assess extent of   myocardial damage.      NOTE: Children less than 1 year old may have higher baseline troponin   levels and results should be interpreted in conjunction with the overall   clinical context.     NOTE: Troponin I testing is performed using a different   testing methodology at [facility] than at other   [facility]. Direct result comparisons should only   be made within the same method.   SERIAL TROPONIN, 1 HOUR - Normal    Troponin I, High Sensitivity 7      Narrative:     Less than 99th percentile of normal range cutoff-  Female and children under 18 years old <14 ng/L; Male <21 ng/L: Negative  Repeat testing should be performed if clinically indicated.     Female and children under 18 years old 14-50 ng/L; Male 21-50 ng/L:  Consistent with possible cardiac damage and possible increased clinical   risk. Serial measurements may help to assess extent of myocardial damage.     >50 ng/L: Consistent with cardiac damage, increased clinical risk and  myocardial infarction. Serial measurements may help assess extent of   myocardial damage.      NOTE: Children less than 1 year old may have higher baseline troponin   levels and results should be interpreted in conjunction with the overall   clinical context.     NOTE: Troponin I testing is performed using a different   testing  methodology at [facility] than at other   [facility]. Direct result comparisons should only   be made within the same method.   CBC WITH AUTO DIFFERENTIAL    WBC 8.7      nRBC 0.0      RBC 5.01      Hemoglobin 15.3      Hematocrit 45.6      MCV 91      MCH 30.5      MCHC 33.6      RDW 14.5      Platelets 167      Neutrophils % 70.2      Immature Granulocytes %, Automated 0.5      Lymphocytes % 20.9      Monocytes % 6.4      Eosinophils % 1.5      Basophils % 0.5      Neutrophils Absolute 6.14      Immature Granulocytes Absolute, Automated 0.04      Lymphocytes Absolute 1.83      Monocytes Absolute 0.56      Eosinophils Absolute 0.13      Basophils Absolute 0.04     TROPONIN SERIES- (INITIAL, 1 HR)    Narrative:     The following orders were created for panel order Troponin I Series, High Sensitivity (0, 1 HR).  Procedure                               Abnormality         Status                     ---------                               -----------         ------                     Troponin I, High Sensiti...[[id number]]  Normal              Final result               Troponin, High Sensitivi...[[id number]]  Normal              Final result                 Please view results for these tests on the individual orders.      CT head wo IV contrast   Final Result   1. No acute intracranial abnormality.   2. Partial opacification of the left maxillary sinus and ethmoid air   cells, please correlate for sinusitis.   3. Small to moderate left mastoid effusion.   4. Unchanged subcortical hypodensities, while nonspecific are   compatible with chronic small vessel ischemic disease.        MACRO:   None        Signed by: Hazel Mackay 3/5/2025 2:13 PM   Dictation workstation:   [id number]      XR chest 1 view   Final Result   No evidence of acute intrathoracic abnormality. Mild cardiomegaly.        Signed by: Kennedy Covarrubias 3/5/2025 1:12 PM   Dictation workstation:   [id number]            Considerations/further MDM:  Patient was seen in conjucntion with my supervising physician,  Dr. Killian. Please refer to her note.    Differential diagnosis includes was not limited to peripheral vertigo etiology such as BPPV versus sinusitis/fluid in the middle ear versus electrolyte disturbance.  Very low suspicion for central cause of vertigo such as CVA as symptoms have been ongoing for a month and patient's NIH stroke scale score is 0.  She is not a candidate for IV thrombolytics.  CT head without IV contrast shows no acute cranial abnormality.  There is concern for sinusitis on CT scan.  Chest x-ray shows no acute cardiopulmonary process.  Laboratory valuation is gross unremarkable.  Patient was given oral dose of meclizine with improvement of her symptoms.  Her potassium was very mildly low at 3.3, oral potassium was given.  Patient was discharged with a prescription for prednisone taper and Flonase to use for presumed chronic sinusitis on CT imaging and was told to follow-up with ENT for further management and evaluation of episodic dizziness.  Patient verbalized understanding and felt comfortable to plan home.  She was discharged in stable condition.    Procedure  Procedures     Gilda Bangura PA-C  03/07/25 [id number]

## 2025-03-06 LAB
ATRIAL RATE: 81 BPM
P AXIS: 49 DEGREES
P OFFSET: 202 MS
P ONSET: 138 MS
PR INTERVAL: 180 MS
Q ONSET: 228 MS
QRS COUNT: 14 BEATS
QRS DURATION: 72 MS
QT INTERVAL: 372 MS
QTC CALCULATION(BAZETT): 432 MS
QTC FREDERICIA: 411 MS
R AXIS: -10 DEGREES
T AXIS: 32 DEGREES
T OFFSET: 414 MS
VENTRICULAR RATE: 81 BPM

## 2025-03-06 NOTE — ED PROVIDER NOTES
"The patient was seen in conjunction with the advanced practice provider, and I performed a substantive portion of the visit. I personally saw the patient and made/approved the management plan and take responsibility for the patient management. All medical decision making was performed by me. All laboratory studies, radiology, and EKGs were reviewed by me.     History: 69-year-old female presents for intermittent dizziness for the past 1 month.  Somewhat worse with changes in position but other times seems to come abruptly when she turns her head such as getting out of her vehicle today where it seem to be getting worse.  She has seen ENT who ordered an outpatient MRI and some additional testing to determine the etiology.  Her symptoms are not persistent and she has periods that are completely asymptomatic in between.  She is otherwise asymptomatic aside from some nasal congestion which she states is relatively chronic due to \"sinus issues.\"  Physical exam:  General: Vitals reviewed. Awake, alert, well-developed, well-nourished, NAD  HEENT: NC/AT, PERRL, MMM, leftward nystagmus noted  Neck: Supple, trachea midline  Respiratory: No respiratory distress, lungs clear to auscultation bilaterally, no wheezes, rhonchi, or rales  CV: Regular rate and regular rhythm, no murmur/gallop/rubs  Abdomen/GI: Soft, non-tender, non-distended, no rebound, guarding, or rigidity, normal bowel sounds  Extremities: Moving all extremities, no deformities  Neuro: AAOx3, cranial nerves intact, strength 5/5 x 4 extremities, sensation diffusely intact, no ataxia on extremeties, no truncal ataxia, normal test of skew, NIH 0  Skin: Warm, dry. No rashes identified  MDM: Patient presents with vertigo described as room spinning which is intermittent, fatigable and she has periods of complete resolution in between episodes intermittent for at least 1 month.  Consider central versus peripheral vertigo.  NIH stroke scale is 0 and there are no " cerebellar findings on exam, normal test of skew and no truncal ataxia therefore based on history and physical exam do believe this is consistent with peripheral vertigo.   Patient treated symptomatically with meclizine with improvement.  CT brain was obtained to rule out intracranial pathology does show partial opacification of the left maxillary sinus and small to moderate left mastoid effusion otherwise no acute process.  Labs without any notable abnormalities aside from mild hypokalemia.  EKG without arrhythmia low suspicion for syncope/near syncope.  Chest x-ray on my independent interpretation with no acute cardiopulmonary process.  Patient feeling improved and at this time do feel appropriate for outpatient follow-up advised to continue to move forward with the testing ordered by ENT and follow-up in their office.  Will also give short course of steroids for possible component of sinusitis and advised patient to take Flonase as well as daily allergy medication as needed in an attempt to see if this will help her symptoms.  Return precautions discussed.    ED Course as of 03/06/25 1608   Wed Mar 05, 2025   1153 EKG on my independent interpretation: Sinus rhythm 81 bpm, normal axis, normal intervals, no acute ST or T wave abnormalities, frequent PVCs [CHANTEL]      ED Course User Index  [CHANTEL] Mary Killian MD         Diagnoses as of 03/06/25 1608   Vertigo              Mary Killian MD  03/06/25 1608

## 2025-03-07 NOTE — PROGRESS NOTES
"    Physical Therapy Treatment    Patient Name: Dyana Irene  MRN: [id number]  Encounter date:  3/10/2025             Visit Number:  12 (including evaluation)  Planned total visits:    Visits Authorized/Insurance Coverage:  MEDICARE A/B, MMO SUPP, [place], NO AUTH     Current Problem  Problem List Items Addressed This Visit    None            Surgery  MILD 10/30    Precautions       Pain       Subjective  ***    Objective  ***    Treatment:     Walk x3 laps fwd, bwd, s/s across pool    Hamstring stretch at stairs  2x30\" ea - stopped due to pain    Fwd step up x10 ea     At rail, 4' depth, cues for TrA brace, x15 ea:  Heel raise  Hip abduction  Hip extension  Hip flexion/SLR  Hamstring curl  March in place    Deep, 5' with noodle under arms, BUE support:  Decompression x2'  Hip abd/adduction x2'  XC ski x2'  Bike x2'  Decompression x2'    Current HEP:  Access Code: [email or web address]  URL: [email or web address]  Date: 10/18/2024  Prepared by: Raymond Huddleston    Exercises  - Seated Hamstring Stretch  - 1 x daily - 7 x weekly - 1 sets - 3 reps - 30 second hold  - Seated Transversus Abdominis Bracing  - 1 x daily - 7 x weekly - 2 sets - 10 reps - 3 second hold  - Seated Hip Adduction Squeeze with Ball  - 1 x daily - 7 x weekly - 2 sets - 10 reps  - Seated Hip Abduction with Resistance  - 1 x daily - 7 x weekly - 2 sets - 10 reps  - Seated March  - 1 x daily - 7 x weekly - 2 sets - 10 reps    Assessment:  Pt's response to treatment: ***    Pain end of session: ***/10    Plan:     Continue with current POC/no changes    Assessment of current progress against goals:  Progressing toward functional goals    Goals:  Active       PT Problem       STG, 5 visits (Met)       Start:  10/18/24    Expected End:  04/29/25    Resolved:  01/29/25    Pt will be independent in HEP to improve LE and core strength, mobility, and function.  Pt will report 50% reduction in pain with functional mobility such as standing, walking, and stairs.         " Pt will increase strength in core and LEs by 1/2 MMT in all planes for improved performance of functional mobility.   (Progressing)       Start:  10/18/24    Expected End:  04/29/25            Pt will demonstrate no more than 25% limitation in lumbar AROM without pain in all planes for improved performance of ADLs.   (Progressing)       Start:  10/18/24    Expected End:  04/29/25            Pt will ambulate moderate community distances across all surfaces without deviation and without pain.  (Progressing)       Start:  10/18/24    Expected End:  04/29/25            Pt will stand for >30 minutes with UE support PRN without increase in pain for improved tolerance to household tasks such as cooking.   (Progressing)       Start:  10/18/24    Expected End:  04/29/25            Pt will perform 5x sit to  11s without pain for improved functional strength and mobility.  (Progressing)       Start:  10/18/24    Expected End:  04/29/25

## 2025-03-10 ENCOUNTER — APPOINTMENT (OUTPATIENT)
Dept: PHYSICAL THERAPY | Facility: CLINIC | Age: 70
End: 2025-03-10
Payer: MEDICARE

## 2025-03-11 ENCOUNTER — APPOINTMENT (OUTPATIENT)
Dept: OTOLARYNGOLOGY | Facility: CLINIC | Age: 70
End: 2025-03-11
Payer: MEDICARE

## 2025-03-11 ENCOUNTER — HOSPITAL ENCOUNTER (OUTPATIENT)
Dept: RADIOLOGY | Facility: CLINIC | Age: 70
Discharge: HOME | End: 2025-03-11
Payer: MEDICARE

## 2025-03-11 DIAGNOSIS — H90.3 ASNHL (ASYMMETRICAL SENSORINEURAL HEARING LOSS): ICD-10-CM

## 2025-03-11 PROCEDURE — 70553 MRI BRAIN STEM W/O & W/DYE: CPT

## 2025-03-11 PROCEDURE — A9575 INJ GADOTERATE MEGLUMI 0.1ML: HCPCS | Performed by: OTOLARYNGOLOGY

## 2025-03-11 PROCEDURE — 2550000001 HC RX 255 CONTRASTS: Performed by: OTOLARYNGOLOGY

## 2025-03-11 PROCEDURE — 70553 MRI BRAIN STEM W/O & W/DYE: CPT | Performed by: RADIOLOGY

## 2025-03-11 RX ORDER — GADOTERATE MEGLUMINE 376.9 MG/ML
0.2 INJECTION INTRAVENOUS
Status: COMPLETED | OUTPATIENT
Start: 2025-03-11 | End: 2025-03-11

## 2025-03-11 RX ADMIN — GADOTERATE MEGLUMINE 23 ML: 376.9 INJECTION INTRAVENOUS at 11:35

## 2025-03-11 NOTE — PROGRESS NOTES
"    Physical Therapy Treatment    Patient Name: Dyana Irene  MRN: [id number]  Encounter date:  3/12/2025             Visit Number:  12 (including evaluation)  Planned total visits:    Visits Authorized/Insurance Coverage:  MEDICARE A/B, M[place], [place], [place]     Current Problem  Problem List Items Addressed This Visit    None            Surgery  MILD 10/30    Precautions       Pain       Subjective  ***    Objective  ***    Treatment:     Walk x3 laps fwd, bwd, s/s across pool    Hamstring stretch at stairs  2x30\" ea - stopped due to pain    Fwd step up x10 ea     At rail, 4' depth, cues for TrA brace, x15 ea:  Heel raise  Hip abduction  Hip extension  Hip flexion/SLR  Hamstring curl  March in place    Deep, 5' with noodle under arms, BUE support:  Decompression x2'  Hip abd/adduction x2'  XC ski x2'  Bike x2'  Decompression x2'    Current HEP:  Access Code: [email or web address]  URL: [email or web address]  Date: 10/18/2024  Prepared by: Raymond Huddleston    Exercises  - Seated Hamstring Stretch  - 1 x daily - 7 x weekly - 1 sets - 3 reps - 30 second hold  - Seated Transversus Abdominis Bracing  - 1 x daily - 7 x weekly - 2 sets - 10 reps - 3 second hold  - Seated Hip Adduction Squeeze with Ball  - 1 x daily - 7 x weekly - 2 sets - 10 reps  - Seated Hip Abduction with Resistance  - 1 x daily - 7 x weekly - 2 sets - 10 reps  - Seated March  - 1 x daily - 7 x weekly - 2 sets - 10 reps    Assessment:  Pt's response to treatment: ***    Pain end of session: ***/10    Plan:     Continue with current POC/no changes    Assessment of current progress against goals:  Progressing toward functional goals    Goals:  Active       PT Problem       STG, 5 visits (Met)       Start:  10/18/24    Expected End:  04/29/25    Resolved:  01/29/25    Pt will be independent in HEP to improve LE and core strength, mobility, and function.  Pt will report 50% reduction in pain with functional mobility such as standing, walking, and stairs.         " Pt will increase strength in core and LEs by 1/2 MMT in all planes for improved performance of functional mobility.   (Progressing)       Start:  10/18/24    Expected End:  04/29/25            Pt will demonstrate no more than 25% limitation in lumbar AROM without pain in all planes for improved performance of ADLs.   (Progressing)       Start:  10/18/24    Expected End:  04/29/25            Pt will ambulate moderate community distances across all surfaces without deviation and without pain.  (Progressing)       Start:  10/18/24    Expected End:  04/29/25            Pt will stand for >30 minutes with UE support PRN without increase in pain for improved tolerance to household tasks such as cooking.   (Progressing)       Start:  10/18/24    Expected End:  04/29/25            Pt will perform 5x sit to  11s without pain for improved functional strength and mobility.  (Progressing)       Start:  10/18/24    Expected End:  04/29/25

## 2025-03-12 ENCOUNTER — APPOINTMENT (OUTPATIENT)
Dept: PHYSICAL THERAPY | Facility: CLINIC | Age: 70
End: 2025-03-12
Payer: MEDICARE

## 2025-03-17 NOTE — PROGRESS NOTES
"    ADULT BALANCE FUNCTION TEST (BFT)    Name:  Dyana Irene  :  1955  Age:  69 y.o.  Date of Evaluation:  3/18/2025    IMPRESSIONS     Suspected bilateral peripheral vestibular involvement given the followin) overall low total caloric responses bilaterally, 2) low gain with present corrective saccades in vHIT tracings bilaterally, 3) absent VEMP responses in the left ear, and 4) abnormal spontaneous/gaze and positional nystagmus consistent with peripheral vestibular involvement. The vestibular system appears to be uncompensated physiologically and uncompensated functionally. While today's evaluation is indicative of bilateral involvement, interpret results with caution as rotational chair testing is considered the \"gold standard\" for diagnosis of bilateral involvement.     Abnormal findings observed during oculomotor testing including consistent undershooting and slow velocity of saccadic targets for rightward eye movements, as well as low velocity to optokinetic stimuli for leftward eye movements. While these findings may be indicative of central vestibulo-ocular pathway involvement, other factors may account for similar findings (i.e. medications, drowsiness, fatigue, visual acuity).    There were no indications of active Benign Paroxysmal Positional Vertigo (BPPV) present. Abnormal observation of gait and transfers given short transfers and veering while walking. Patient is at risk of falling based on today's evaluation.    RECOMMENDATIONS     Consider vestibular physical therapy to address uncompensated bilateral vestibulopathy. Emphasis on sensory substitution exercises to account for inadequate vestibular input, gaze stabilization exercises for VOR deficiencies, habituation exercises to triggers, and fall risk prevention.  Consider re-evaluation as medically indicated.  Maintain a healthy lifestyle to help body function overall.  Continue monitoring per ENT/PCP preference.    Time: " "1462-9181    HISTORY     Patient was seen for Balance Function Testing (BFT) due to a history of dizziness/imbalance. Vestibular case history collected via patient-clinician interview, patient chart review, and patient questionnaires.    Patient reported history of dizziness described as vertigo/spinning.  Symptoms began July 2024 and returned more significantly since February 2025.  Episodes occur daily and last several minutes (30-60) before subsiding.  Most recent episode occurred yesterday.  Associated symptoms include lightheadedness, nausea, and imbalance.  Symptoms are provoked by turning head quickly, however notes symptoms can occur sporadically without any specific triggers.  Symptoms are alleviated by laying down in a chair and remain still.  Overall the patient's symptoms have increased in frequency over time.  This patient has had a total of 0 falls due to their symptoms.   Denied any symptoms provoked by sneezing or coughing, as well as any presence of autophony.   Relevant medical history includes bilateral tinnitus (worse to the left), asymmetrical hearing loss.  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 Tympanometry revealed normal ear canal volume, bilaterally.  Patient reported complying with pre-test instructions.     EVALUATION     See VNG, vHIT, & VEMP Raw Data in \"Media\"    TEST RESULTS     BEDSIDE ASSESSMENT TEST  The bedside assessment is an optional portion of the test battery to further assist in differential diagnosis and screen for eye abnormalities which may affect testing.    Test Results   Otoscopy Non-occluding cerumen.   Tympanometry Normal (Type A).   Extra-Ocular Range of Motion Normal. Of note, observed left-beating spontaneous and gaze evoked nystagmus throughout test.   Cover/Uncover Normal.   Cervical Neck Exam " Normal.   Vertebral Artery Screen Normal.   Ocular Counter-Roll Normal.   VOR Cancellation Normal.   Modified Clinical Test for Sensory Interaction of Balance  Deferred on this date due to observation of gait and transfers.       VIDEONYSTAGMOGRAPHY (VNG) TEST  VNG provides objective indications of peripheral and central vestibulo-ocular pathway involvement. Ocular motor testing to visually guided targets is conducted using a dual channel video-recording technique for the recording of eye movement in the horizontal and vertical planes. Air caloric testing is performed at 48 degrees C and 24 degrees C.     Test Result   Spontaneous Nystagmus Present. Persistent high velocity (7 d/s) left-beating nystagmus. Reduced with fixation light.   Gaze Nystagmus Abnormal given the presence of persistent high velocity (1-7 d/s) nystagmus present in the gaze left with and without fixation, and gaze right without fixation condition(s). Patient did not report symptoms of dizziness. Given high velocity in one condition - clinically significant finding.   Random Saccade Abnormal given consistent undershooting and slow velocity of saccadic targets for rightward eye movements. Test repeated for best performance.   Smooth Pursuit/Tracking Normal. Of note, overlaid spontaneous nystagmus throughout testing creating poor morphology - likely due to spontaneous nystagmus than true central component.   Optokinetic Nystagmus Abnormal given low velocity to optokinetic stimuli for leftward eye movements. Test repeated at multiple speeds for best performance.   Wolfgang-Hallpike Normal.   Roll Normal.   Positional Abnormal given the presence of persistent low velocity (2-4 d/s) nystagmus present in the head left, head center, and precaloric position(s). Nystagmus reduced with fixation light. Patient did not report symptoms of dizziness. Given present in over 50% of positions tested - clinically significant finding.   Bithermal Caloric Abnormal.  Overall low total SPV for both left and right irrigations (total LE: 8 d/s, total RE: 6 d/s). Indicative of bilateral vestibulopathy.    *Of note, caloric calculations corrected for pre-caloric nystagmus (4 d/s left-beating).      VIDEO HEAD IMPULSE TEST (vHIT)  The vHIT procedure provides objective assessment of the high frequency vestibulo-ocular reflex (VOR) for each semicircular canal. Rapid, random horizontal and vertical thrusts are applied to the patient's head to provoke the VOR. The vHIT procedure includes two separate paradigms: Head Impulse Paradigm (HIMP) and Suppression Head Impulse Paradigm (SHIMP). SHIMP is an optional paradigm that is not appropriate to perform for every patient. However, it is appropriate to perform SHIMP when there is verified evidence of possible vestibulopathy in the traditional HIMP test.     Head Impulse Paradigm (HIMP)   Right Ear   Canal Gain Overt Saccades Covert Saccades   Lateral 0.72 Present Absent   Anterior 1.03 Absent Absent   Posterior 0.55 Present Absent        Head Impulse Paradigm (HIMP)   Left Ear   Canal Gain Overt Saccades Covert Saccades   Lateral 0.74 Present Absent   Anterior 0.64 Present Absent   Posterior 0.52 Present Absent     Total gain for right lateral/posterior and left lateral/posterior/anterior canals was below normal limits (<0.80 is abnormal for lateral, <0.70 is abnormal for vertical).  There was evidence of overt saccades in the right lateral/posterior and left lateral/posterior/anterior canals.      CERVICAL VESTIBULAR EVOKED MYOGENIC POTENTIALS (cVEMP)  The cVEMP procedure is an evoked potential used to test the saccule and its afferent pathway. An asymmetry ratio is utilized to determine side of lesion. The cVEMP was recorded with the patient cervical extension to produce isolated contraction of the ipsilateral sternocleidomastoid (SCM) muscle. The cVEMP was recorded using a 500 Hz tone burst or 1000 Hz tone burst at a rate of 5.1.      Ear  Presentation Level Amplitude P1 Latency N1 Latency  Amplitude Asymmetry Ratio   Right 100 dB nHL 63.41 µV 13.00 ms 21.33 ms Could not calculate due to threshold difference.   Left NR at limits of equipment NR NR NR       Superior Canal Dehiscence Screening (75 dB nHL): N/A bilaterally    Replicable cVEMP responses were absent in the left ear(s). Responses were within normal amplitude levels in the right ear(s). The amplitude asymmetry ratio could not be calculated.        OCULAR VESTIBULAR EVOKED MYOGENIC POTENTIALS (oVEMP)  The oVEMP procedure is an evoked potential used to test the utricle and its afferent pathway. An asymmetry ratio is utilized to determine side of lesion. This is a contralateral recording. The oVEMP was recorded with the patient seated upright with eyes tilted upward to produce isolated contraction of the contralateral inferior oblique muscle. The oVEMP were recorded using a 500 Hz, 1000 Hz, 4000 Hz tone burst at a rate of 5.1.       Ear Presentation Level Amplitude N1 Latency  P1 Latency  Amplitude Asymmetry Ratio   Right 100 dB nHL 7.32 µV 10.00 ms 16.33 ms Could not calculate due to threshold difference.   Left NR at limits of equipment NR NR NR       Meniere's Disease Screening (1000 Hz): DNT due to patient fatigue  Superior Canal Dehiscence Screening (4000 Hz): DNT due to patient fatigue    Replicable oVEMP responses were absent in the left ear(s). Responses were within normal amplitude levels in the right ear(s). The amplitude asymmetry ratio could not be calculated.      Raw data reviewed by a member of the Vestibular & Balance Disorders team. Testing and interpretation of results completed by Kt Garcia CCC-A [facility]. It was my pleasure to evaluate this patient.       Kt Garcia, CCC-A [facility]  Senior Clinical Vestibular Audiologist

## 2025-03-18 ENCOUNTER — APPOINTMENT (OUTPATIENT)
Dept: AUDIOLOGY | Facility: CLINIC | Age: 70
End: 2025-03-18
Payer: MEDICARE

## 2025-03-18 DIAGNOSIS — R42 DIZZINESS AND GIDDINESS: Primary | ICD-10-CM

## 2025-03-18 PROCEDURE — 92537 CALORIC VSTBLR TEST W/REC: CPT

## 2025-03-18 PROCEDURE — 92567 TYMPANOMETRY: CPT

## 2025-03-18 PROCEDURE — 92519 VEMP TST I&R CERVICAL&OCULAR: CPT

## 2025-03-18 PROCEDURE — 92540 BASIC VESTIBULAR EVALUATION: CPT

## 2025-03-18 PROCEDURE — 92700 UNLISTED ORL SERVICE/PX: CPT

## 2025-03-18 NOTE — PATIENT INSTRUCTIONS
BALANCE FUNCTION TEST (BFT)  AFTER VISIT SUMMARY      TESTING SUMMARY     The purpose of today's testing was to evaluate for any vestibular system (inner ear) involvement to account for your symptoms of dizziness/imbalance. Deep inside each of your ears, there are 5 balance organs which contribute to your ability to maintain balance and reduce dizziness. Our vestibular system involves 3 semicircular canals (“spinning detectors”) and 2 otolith organs (“gravity sensors”).    IMPRESSIONS     Based on today's evaluation, your vestibular system appears to be weakened on both sides and possibly contributing as a source for your symptoms.    RECOMMENDATIONS     Continue medical follow up with Dr. Witt.   Consider further vestibular physical therapy to address vestibular loss.   Consider re-evaluation as medically indicated.  Maintain a healthy lifestyle to help body function overall.    Testing and interpretation of results completed by Kt Garcia CCC-A [facility]. It was my pleasure to evaluate this patient.       Kt Garcia, CCC-A [facility]  Senior Clinical Vestibular Audiologist

## 2025-03-19 DIAGNOSIS — I10 ESSENTIAL (PRIMARY) HYPERTENSION: ICD-10-CM

## 2025-03-19 RX ORDER — LOSARTAN POTASSIUM 100 MG/1
100 TABLET ORAL DAILY
Qty: 90 TABLET | Refills: 3 | Status: SHIPPED | OUTPATIENT
Start: 2025-03-19

## 2025-03-26 ENCOUNTER — DOCUMENTATION (OUTPATIENT)
Dept: PHYSICAL THERAPY | Facility: CLINIC | Age: 70
End: 2025-03-26

## 2025-03-26 ENCOUNTER — APPOINTMENT (OUTPATIENT)
Dept: PHYSICAL THERAPY | Facility: CLINIC | Age: 70
End: 2025-03-26
Payer: MEDICARE

## 2025-03-26 NOTE — PROGRESS NOTES
"    Physical Therapy Treatment    Patient Name: Dyana Irene  MRN: [id number]  Encounter date:  3/26/2025             Visit Number:  12 (including evaluation)  Planned total visits:    Visits Authorized/Insurance Coverage:  MEDICARE A/B, [place], [place], [place]     Current Problem  Problem List Items Addressed This Visit    None            Surgery  MILD 10/30    Precautions       Pain       Subjective  ***    Objective  ***    Treatment:     Walk x3 laps fwd, bwd, s/s across pool    Hamstring stretch at stairs  2x30\" ea - stopped due to pain    Fwd step up x10 ea     At rail, 4' depth, cues for TrA brace, x15 ea:  Heel raise  Hip abduction  Hip extension  Hip flexion/SLR  Hamstring curl  March in place    Deep, 5' with noodle under arms, BUE support:  Decompression x2'  Hip abd/adduction x2'  XC ski x2'  Bike x2'  Decompression x2'    Current HEP:  Access Code: [email or web address]  URL: [email or web address]  Date: 10/18/2024  Prepared by: Raymond Huddleston    Exercises  - Seated Hamstring Stretch  - 1 x daily - 7 x weekly - 1 sets - 3 reps - 30 second hold  - Seated Transversus Abdominis Bracing  - 1 x daily - 7 x weekly - 2 sets - 10 reps - 3 second hold  - Seated Hip Adduction Squeeze with Ball  - 1 x daily - 7 x weekly - 2 sets - 10 reps  - Seated Hip Abduction with Resistance  - 1 x daily - 7 x weekly - 2 sets - 10 reps  - Seated March  - 1 x daily - 7 x weekly - 2 sets - 10 reps    Assessment:  Pt's response to treatment: ***    Pain end of session: ***/10    Plan:     Continue with current POC/no changes    Assessment of current progress against goals:  Progressing toward functional goals    Goals:  Active       PT Problem       STG, 5 visits (Met)       Start:  10/18/24    Expected End:  04/29/25    Resolved:  01/29/25    Pt will be independent in HEP to improve LE and core strength, mobility, and function.  Pt will report 50% reduction in pain with functional mobility such as standing, walking, and stairs.         " Pt will increase strength in core and LEs by 1/2 MMT in all planes for improved performance of functional mobility.   (Progressing)       Start:  10/18/24    Expected End:  04/29/25            Pt will demonstrate no more than 25% limitation in lumbar AROM without pain in all planes for improved performance of ADLs.   (Progressing)       Start:  10/18/24    Expected End:  04/29/25            Pt will ambulate moderate community distances across all surfaces without deviation and without pain.  (Progressing)       Start:  10/18/24    Expected End:  04/29/25            Pt will stand for >30 minutes with UE support PRN without increase in pain for improved tolerance to household tasks such as cooking.   (Progressing)       Start:  10/18/24    Expected End:  04/29/25            Pt will perform 5x sit to  11s without pain for improved functional strength and mobility.  (Progressing)       Start:  10/18/24    Expected End:  04/29/25

## 2025-03-27 NOTE — PROGRESS NOTES
"    Physical Therapy Treatment    Patient Name: Dyana Irene  MRN: [id number]  Encounter date:  3/28/2025             Visit Number:  12 (including evaluation)  Planned total visits:    Visits Authorized/Insurance Coverage:  MEDICARE A/B, MMO [place], [place], [place] AUTH     Current Problem  Problem List Items Addressed This Visit    None        Surgery  MILD 10/30    Precautions       Pain       Subjective  ***    Objective  ***    Treatment:     Walk x3 laps fwd, bwd, s/s across pool    Hamstring stretch at stairs  2x30\" ea - stopped due to pain    Fwd step up x10 ea     At rail, 4' depth, cues for TrA brace, x15 ea:  Heel raise  Hip abduction  Hip extension  Hip flexion/SLR  Hamstring curl  March in place    Deep, 5' with noodle under arms, BUE support:  Decompression x2'  Hip abd/adduction x2'  XC ski x2'  Bike x2'  Decompression x2'    Current HEP:  Access Code: [email or web address]  URL: [email or web address]  Date: 10/18/2024  Prepared by: Raymond Huddleston    Exercises  - Seated Hamstring Stretch  - 1 x daily - 7 x weekly - 1 sets - 3 reps - 30 second hold  - Seated Transversus Abdominis Bracing  - 1 x daily - 7 x weekly - 2 sets - 10 reps - 3 second hold  - Seated Hip Adduction Squeeze with Ball  - 1 x daily - 7 x weekly - 2 sets - 10 reps  - Seated Hip Abduction with Resistance  - 1 x daily - 7 x weekly - 2 sets - 10 reps  - Seated March  - 1 x daily - 7 x weekly - 2 sets - 10 reps    Assessment:  Pt's response to treatment: ***    Pain end of session: ***/10    Plan:     Continue with current POC/no changes    Assessment of current progress against goals:  Progressing toward functional goals    Goals:  Active       PT Problem       STG, 5 visits (Met)       Start:  10/18/24    Expected End:  04/29/25    Resolved:  01/29/25    Pt will be independent in HEP to improve LE and core strength, mobility, and function.  Pt will report 50% reduction in pain with functional mobility such as standing, walking, and stairs.         Pt " will increase strength in core and LEs by 1/2 MMT in all planes for improved performance of functional mobility.   (Progressing)       Start:  10/18/24    Expected End:  04/29/25            Pt will demonstrate no more than 25% limitation in lumbar AROM without pain in all planes for improved performance of ADLs.   (Progressing)       Start:  10/18/24    Expected End:  04/29/25            Pt will ambulate moderate community distances across all surfaces without deviation and without pain.  (Progressing)       Start:  10/18/24    Expected End:  04/29/25            Pt will stand for >30 minutes with UE support PRN without increase in pain for improved tolerance to household tasks such as cooking.   (Progressing)       Start:  10/18/24    Expected End:  04/29/25            Pt will perform 5x sit to  11s without pain for improved functional strength and mobility.  (Progressing)       Start:  10/18/24    Expected End:  04/29/25

## 2025-03-28 ENCOUNTER — DOCUMENTATION (OUTPATIENT)
Dept: PHYSICAL THERAPY | Facility: CLINIC | Age: 70
End: 2025-03-28
Payer: MEDICARE

## 2025-03-28 ENCOUNTER — APPOINTMENT (OUTPATIENT)
Dept: PHYSICAL THERAPY | Facility: CLINIC | Age: 70
End: 2025-03-28
Payer: MEDICARE

## 2025-04-01 ENCOUNTER — TELEPHONE (OUTPATIENT)
Dept: OTOLARYNGOLOGY | Facility: CLINIC | Age: 70
End: 2025-04-01

## 2025-04-01 ENCOUNTER — APPOINTMENT (OUTPATIENT)
Dept: OTOLARYNGOLOGY | Facility: CLINIC | Age: 70
End: 2025-04-01
Payer: MEDICARE

## 2025-04-01 VITALS — HEIGHT: 65 IN | BODY MASS INDEX: 41.82 KG/M2 | WEIGHT: 251 LBS | TEMPERATURE: 95.9 F

## 2025-04-01 DIAGNOSIS — R42 DIZZINESS: Primary | ICD-10-CM

## 2025-04-01 DIAGNOSIS — H90.3 ASNHL (ASYMMETRICAL SENSORINEURAL HEARING LOSS): ICD-10-CM

## 2025-04-01 PROCEDURE — 3008F BODY MASS INDEX DOCD: CPT | Performed by: OTOLARYNGOLOGY

## 2025-04-01 PROCEDURE — 1157F ADVNC CARE PLAN IN RCRD: CPT | Performed by: OTOLARYNGOLOGY

## 2025-04-01 PROCEDURE — 1159F MED LIST DOCD IN RCRD: CPT | Performed by: OTOLARYNGOLOGY

## 2025-04-01 PROCEDURE — 1160F RVW MEDS BY RX/DR IN RCRD: CPT | Performed by: OTOLARYNGOLOGY

## 2025-04-01 PROCEDURE — 99214 OFFICE O/P EST MOD 30 MIN: CPT | Performed by: OTOLARYNGOLOGY

## 2025-04-01 PROCEDURE — 1123F ACP DISCUSS/DSCN MKR DOCD: CPT | Performed by: OTOLARYNGOLOGY

## 2025-04-01 NOTE — TELEPHONE ENCOUNTER
Pt was seen today and forgot to ask a question. She said that Dr. Haddad told her she had ear unbalance. She wants that explained to her better. She doesn't understand what that means. She said you told her there was nothing to worry about.

## 2025-04-01 NOTE — PROGRESS NOTES
SUSHILA Irene is a 69 y.o. female follow-up balance testing.  She a variety of abnormalities including spontaneous nystagmus and evidence of peripheral weakness bilaterally.  Fortunately she has been without symptoms over the last 2 weeks which is an improvement for her.  She has not lost consciousness.  She had MRI of the brain and IAC which revealedNo acute infarct, hemorrhage, or mass of the brain.  She did have focal enhancement at the apex of the right internal auditory canal in the region of the 7th cranial nerve and the ostium of the left internal Otocort canal with focal enhancement posteriorly.  Those findings were nonspecific.    History: Long history of eustachian tube dysfunction.  Presents with progressively increased dizziness and tinnitus.  Mostly left-sided.  Denies otalgia and otorrhea.  She feels more hearing loss on the left-hand side.  Audiogram February 28 with left mixed loss.  Right high-frequency downsloping sensorineural hearing loss.  Her mixed loss is considerably worse than from an audiogram 2023.      Past Medical History:   Diagnosis Date    Abnormal electrocardiography 12/30/2022    Allergic rhinitis 02/13/2024    [facility]    Anxiety 09/06/2023    Aortic valve sclerosis 09/06/2023    Arthropathy of hand 02/13/2024    Atrophic emphysema (Multi) 09/06/2023    Benign essential hypertension 09/06/2023    Bilateral carotid artery stenosis 10/24/2022    Breast cyst 1972    Chronic bilateral low back pain with bilateral sciatica 11/06/2023    Chronic pain disorder     Cigarette smoker 02/13/2024    Coronary artery disease involving native coronary artery without angina pectoris 09/06/2023    COVID-19 11/04/2021    Depressive disorder 02/13/2024    Disc degeneration, lumbar 09/06/2023    Displaced fracture of proximal phalanx of unspecified finger, initial encounter for closed fracture 11/25/2019    Diverticulosis 09/06/2023    Elevated coronary artery calcium score 09/06/2023    CT  Coronary artery calcium score (6/17): 736      Enlarged liver 09/06/2023    Fibrocystic breast 1972    Fibromyalgia 09/06/2023    Fibromyalgia, primary     Hearing loss 02/13/2024    History of deep vein thrombosis 09/06/2023    History of depression 02/13/2024    Impaired fasting glucose 09/06/2023    Insomnia 09/06/2023    Joint pain     Kidney cyst, acquired 09/06/2023    Lumbosacral disc disease     Lumbosacral spondylosis without myelopathy 09/06/2023    Lung nodules 09/06/2023    Mitral valve regurgitation 09/06/2023    Mitral valve regurgitation:  mild-mod (4/22), mild (4/24)  LVEF 60-65% (4/24)      Mixed hyperlipidemia 09/06/2023    Mixed stress and urge urinary incontinence 09/06/2023    Neuralgia and neuritis, unspecified 02/07/2017    Neuralgia    Non-pressure chronic ulcer of skin of other sites limited to breakdown of skin 02/04/2019    Obstructive sleep apnea 09/06/2023    Osteoarthritis of right knee 02/13/2024    Other chronic pain 10/18/2024    [facility]    Other microscopic hematuria 07/20/2018    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    Pelvic and perineal pain 07/25/2019    Persistent cough 02/13/2024    [facility]    Personal history of other diseases of the digestive system 09/27/2016    History of diverticulitis of colon    Personal history of other endocrine, nutritional and metabolic disease 02/04/2019    History of vitamin D deficiency    Personal history of pulmonary embolism 04/06/2022    Pes anserinus bursitis of right knee 02/13/2024    Phlebitis and thrombophlebitis of unspecified site 05/21/2016    Superficial thrombophlebitis    Prediabetes 12/31/2024    Primary osteoarthritis, unspecified hand 04/26/2021    Arthritis of carpometacarpal joint    Sensorineural hearing loss, bilateral 01/19/2023    Severe obesity (Multi) 02/13/2024    Solitary pulmonary nodule 05/10/2021    Spinal stenosis, lumbar region, with neurogenic claudication 06/25/2021    Squamous cell carcinoma  of skin of lower limb, including hip 04/12/2023    Tobacco dependence due to cigarettes 02/13/2024    Urethral disorder, unspecified 07/20/2018    Zoster without complications 02/04/2019            Medications:     Current Outpatient Medications:     ascorbic acid (Vitamin C) 1,000 mg tablet, Take 1 tablet (1,000 mg) by mouth once daily., Disp: , Rfl:     aspirin 81 mg EC tablet, 1 tablet Orally Once a day, Disp: , Rfl:     atorvastatin (Lipitor) 80 mg tablet, Take 1 tablet (80 mg) by mouth once daily., Disp: 90 tablet, Rfl: 3    chlorthalidone (Hygroton) 25 mg tablet, Take 1 tablet (25 mg) by mouth once daily., Disp: 90 tablet, Rfl: 3    cholecalciferol (Vitamin D-3) 50 MCG (2000 UT) tablet, 1 tablet Orally Once a day, Disp: , Rfl:     DULoxetine (Cymbalta) 30 mg DR capsule, Take 1 capsule (30 mg) by mouth once daily. Do not crush or chew. Take with 60 mg cymbalta at home, Disp: 90 capsule, Rfl: 1    DULoxetine (Cymbalta) 60 mg DR capsule, TAKE 1 CAPSULE BY MOUTH EVERY DAY, Disp: 90 capsule, Rfl: 0    ezetimibe (Zetia) 10 mg tablet, Take 1 tablet (10 mg) by mouth once daily., Disp: 90 tablet, Rfl: 3    fluticasone (Flonase) 50 mcg/actuation nasal spray, Administer 2 sprays into each nostril once daily. Shake gently. Before first use, prime pump. After use, clean tip and replace cap., Disp: 16 g, Rfl: 0    losartan (Cozaar) 100 mg tablet, TAKE 1 TABLET BY MOUTH EVERY DAY, Disp: 90 tablet, Rfl: 3    meclizine (Antivert) 25 mg tablet, Take 1 tablet (25 mg) by mouth 3 times a day as needed for dizziness., Disp: 30 tablet, Rfl: 0    metoprolol succinate XL (Toprol-XL) 25 mg 24 hr tablet, TAKE 1 TABLET BY MOUTH EVERY DAY, Disp: 90 tablet, Rfl: 3    multivitamin-min-iron-FA-vit K (Adults Multivitamin) 18 mg iron-400 mcg-25 mcg tablet, TAKE 1 TABLET DAILY., Disp: , Rfl:     ondansetron (Zofran) 4 mg tablet, Take 1 tablet (4 mg) by mouth every 6 hours if needed for nausea or vomiting., Disp: 12 tablet, Rfl: 0    potassium  "chloride CR 20 mEq ER tablet, Take 1 tablet (20 mEq) by mouth 2 times a day., Disp: 180 tablet, Rfl: 3    topiramate (Topamax) 100 mg tablet, 1 tablet Orally every 12 hours, Disp: , Rfl:     diazePAM (Valium) 2 mg tablet, Take 1 by mouth 1 hour prior to MRI, repeat x 1 as needed, Disp: 2 tablet, Rfl: 0     Allergies:  Allergies   Allergen Reactions    Hydromorphone Other     Shallow Breathing    Nitrofurantoin Macrocrystal Itching and GI Upset    Valdecoxib Itching    Nirmatrelvir-Ritonavir Unknown        Physical Exam:  Last Recorded Vitals  Temperature 35.5 °C (95.9 °F), height 1.651 m (5' 5\"), weight 114 kg (251 lb).  General:     General appearance: Well-developed, well-nourished in no acute distress.       Voice:  normal       Head/face: Normal appearance; nontender to palpation     Facial nerve function: Normal and symmetric bilaterally.    Oral/oropharynx:     Oral vestibule: Normal labial and gingival mucosa     Tongue/floor of mouth: Normal without lesion     Oropharynx: Clear.  No lesions present of the hard/soft palate, posterior pharynx    Neck:     Neck: Normal appearance, trachea midline     Salivary glands: Normal to palpation bilaterally     Lymph nodes: No cervical lymphadenopathy to palpation     Thyroid: No thyromegaly.  No palpable nodules     Range of motion: Normal    Neurological:     Cortical functions: Alert and oriented x3, appropriate affect       Larynx/hypopharynx:     Laryngeal findings: Mirror exam inadequate or limited secondary to enlarged base of tongue and/or excessive gagging    Ear:     Ear canal: Normal bilaterally     Tympanic membrane: Intact and mobile Right, middle ear aerated.  Left retracted to the promontory.  No inflammation or effusion.     Pinna: Normal bilaterally     Hearing:  Gross hearing assessment normal by voice    Nose:     Visualized using: Anterior rhinoscopy     Nasopharynx: Inadequate mirror exam secondary to gag, anatomy.       Nasal dorsum: Nontraumatic " midline appearance     Septum: Midline     Inferior turbinates: Normally sized     Mucosa: Bilateral, pink, normal appearing       ASSESSMENT/PLAN:  Her eustachian tube dysfunction and retraction on the left appears stable.  Abnormalities on her balance testing battery suggesting bilateral peripheral weakness.  It is unclear if this is in any way related to the findings documented on the MRI.  Fortunately, clinically, she seems to be improving.  We talked about physical therapy which is considered in treatment for this however she seems to have done well over the last 2 weeks and it is possible that her symptoms are spontaneously improving again as they have in the past.  I do not see a role for procedure or additional medication at present and I have asked her to contact us with recurrence or different symptoms to reconsider treatment at that time.  We may repeat the MRI with additional dedicated thin cuts if symptoms recur or new symptoms arise        Zach Witt MD

## 2025-04-07 NOTE — ASSESSMENT & PLAN NOTE
Dr. Rivera checked lipid panel in December: Total cholesterol 168, triglycerides 160, HDL 50 and LDL 85.  Continue the atorvastatin and ezetimibe therapy and will review lipid panels on epic in the future.

## 2025-04-07 NOTE — ASSESSMENT & PLAN NOTE
Mild to moderate mitral valve regurgitation in April 2022 and mild in April 2024.  Plan was to repeat echocardiogram in April 2026 unless concerning symptoms arise.  Clinically stable at this time we will plan on repeating the echo in April 2026.

## 2025-04-11 ENCOUNTER — OFFICE VISIT (OUTPATIENT)
Dept: CARDIOLOGY | Facility: CLINIC | Age: 70
End: 2025-04-11
Payer: MEDICARE

## 2025-04-11 VITALS
SYSTOLIC BLOOD PRESSURE: 126 MMHG | RESPIRATION RATE: 18 BRPM | HEART RATE: 105 BPM | DIASTOLIC BLOOD PRESSURE: 76 MMHG | OXYGEN SATURATION: 96 %

## 2025-04-11 DIAGNOSIS — E66.813 CLASS 3 SEVERE OBESITY DUE TO EXCESS CALORIES WITHOUT SERIOUS COMORBIDITY WITH BODY MASS INDEX (BMI) OF 40.0 TO 44.9 IN ADULT: ICD-10-CM

## 2025-04-11 DIAGNOSIS — I10 BENIGN ESSENTIAL HYPERTENSION: ICD-10-CM

## 2025-04-11 DIAGNOSIS — E66.01 CLASS 3 SEVERE OBESITY WITH BODY MASS INDEX (BMI) OF 40.0 TO 44.9 IN ADULT, UNSPECIFIED OBESITY TYPE, UNSPECIFIED WHETHER SERIOUS COMORBIDITY PRESENT: ICD-10-CM

## 2025-04-11 DIAGNOSIS — F17.210 TOBACCO DEPENDENCE DUE TO CIGARETTES: ICD-10-CM

## 2025-04-11 DIAGNOSIS — E66.01 CLASS 3 SEVERE OBESITY DUE TO EXCESS CALORIES WITHOUT SERIOUS COMORBIDITY WITH BODY MASS INDEX (BMI) OF 40.0 TO 44.9 IN ADULT: ICD-10-CM

## 2025-04-11 DIAGNOSIS — E78.2 MIXED HYPERLIPIDEMIA: ICD-10-CM

## 2025-04-11 DIAGNOSIS — E66.813 CLASS 3 SEVERE OBESITY WITH BODY MASS INDEX (BMI) OF 40.0 TO 44.9 IN ADULT, UNSPECIFIED OBESITY TYPE, UNSPECIFIED WHETHER SERIOUS COMORBIDITY PRESENT: ICD-10-CM

## 2025-04-11 DIAGNOSIS — I34.0 MITRAL VALVE INSUFFICIENCY, UNSPECIFIED ETIOLOGY: Primary | ICD-10-CM

## 2025-04-11 DIAGNOSIS — R93.1 ELEVATED CORONARY ARTERY CALCIUM SCORE: ICD-10-CM

## 2025-04-11 PROCEDURE — 1123F ACP DISCUSS/DSCN MKR DOCD: CPT | Performed by: INTERNAL MEDICINE

## 2025-04-11 PROCEDURE — 3078F DIAST BP <80 MM HG: CPT | Performed by: INTERNAL MEDICINE

## 2025-04-11 PROCEDURE — 1157F ADVNC CARE PLAN IN RCRD: CPT | Performed by: INTERNAL MEDICINE

## 2025-04-11 PROCEDURE — 99214 OFFICE O/P EST MOD 30 MIN: CPT | Performed by: INTERNAL MEDICINE

## 2025-04-11 PROCEDURE — 1159F MED LIST DOCD IN RCRD: CPT | Performed by: INTERNAL MEDICINE

## 2025-04-11 PROCEDURE — 1126F AMNT PAIN NOTED NONE PRSNT: CPT | Performed by: INTERNAL MEDICINE

## 2025-04-11 PROCEDURE — 3074F SYST BP LT 130 MM HG: CPT | Performed by: INTERNAL MEDICINE

## 2025-04-11 RX ORDER — SEMAGLUTIDE 0.25 MG/.5ML
0.25 INJECTION, SOLUTION SUBCUTANEOUS WEEKLY
Qty: 0.5 ML | Refills: 11 | Status: SHIPPED | OUTPATIENT
Start: 2025-04-11

## 2025-04-11 ASSESSMENT — ENCOUNTER SYMPTOMS
LOSS OF SENSATION IN FEET: 0
OCCASIONAL FEELINGS OF UNSTEADINESS: 0
VERTIGO: 1
SHORTNESS OF BREATH: 0
COUGH: 0
DEPRESSION: 0
PALPITATIONS: 0
DYSPNEA ON EXERTION: 0
NUMBNESS: 0
ABDOMINAL PAIN: 0
HEMATURIA: 0
BLURRED VISION: 0
DYSURIA: 0
PARESTHESIAS: 0

## 2025-04-11 ASSESSMENT — LIFESTYLE VARIABLES
HAVE YOU OR SOMEONE ELSE BEEN INJURED AS A RESULT OF YOUR DRINKING: NO
AUDIT-C TOTAL SCORE: 1
HOW MANY STANDARD DRINKS CONTAINING ALCOHOL DO YOU HAVE ON A TYPICAL DAY: 1 OR 2
HOW OFTEN DO YOU HAVE SIX OR MORE DRINKS ON ONE OCCASION: NEVER
HOW OFTEN DO YOU HAVE A DRINK CONTAINING ALCOHOL: MONTHLY OR LESS
AUDIT TOTAL SCORE: 1
HAS A RELATIVE, FRIEND, DOCTOR, OR ANOTHER HEALTH PROFESSIONAL EXPRESSED CONCERN ABOUT YOUR DRINKING OR SUGGESTED YOU CUT DOWN: NO
SKIP TO QUESTIONS 9-10: 1

## 2025-04-11 ASSESSMENT — PAIN SCALES - GENERAL: PAINLEVEL_OUTOF10: 0-NO PAIN

## 2025-04-11 NOTE — PROGRESS NOTES
Subjective   Dyana Irene is a 69 y.o. female.    Chief Complaint:  Follow-up (Follow up, C/O episodic dizziness without trigger)    HPI  Overall feels well.  Gaining weight and would like to be put on a medication to help her lose weight.  No chest pain or angina.    Review of Systems   Constitutional: Negative for malaise/fatigue.   HENT:  Negative for congestion.    Eyes:  Negative for blurred vision.   Cardiovascular:  Negative for chest pain, dyspnea on exertion and palpitations.   Respiratory:  Negative for cough and shortness of breath.    Musculoskeletal:  Negative for joint pain.   Gastrointestinal:  Negative for abdominal pain.   Genitourinary:  Negative for dysuria and hematuria.   Neurological:  Positive for vertigo. Negative for numbness and paresthesias.       Objective   Constitutional:       Appearance: Not in distress.   Eyes:      Conjunctiva/sclera: Conjunctivae normal.   Neck:      Vascular: JVD normal.   Pulmonary:      Breath sounds: Normal breath sounds. No wheezing. No rhonchi. No rales.   Cardiovascular:      Normal rate. Regular rhythm.      Murmurs: There is no murmur.      No gallop.  No click. No rub.   Abdominal:      Palpations: Abdomen is soft.   Neurological:      General: No focal deficit present.      Mental Status: Alert.         Lab Review:   Admission on 03/05/2025, Discharged on 03/05/2025   Component Date Value    Ventricular Rate 03/05/2025 81     Atrial Rate 03/05/2025 81     ND Interval 03/05/2025 180     QRS Duration 03/05/2025 72     QT Interval 03/05/2025 372     QTC Calculation(Bazett) 03/05/2025 432     P Axis 03/05/2025 49     R Axis 03/05/2025 -10     T [place] 03/05/2025 32     QRS Count 03/05/2025 14     Q Onset 03/05/2025 228     P Onset 03/05/2025 138     P Offset 03/05/2025 202     T Offset 03/05/2025 414     QTC Fredericia 03/05/2025 411     WBC 03/05/2025 8.7     nRBC 03/05/2025 0.0     RBC 03/05/2025 5.01     Hemoglobin 03/05/2025 15.3     Hematocrit  03/05/2025 45.6     MCV 03/05/2025 91     MCH 03/05/2025 30.5     MCHC 03/05/2025 33.6     RDW 03/05/2025 14.5     Platelets 03/05/2025 167     Neutrophils % 03/05/2025 70.2     Immature Granulocytes %,* 03/05/2025 0.5     Lymphocytes % 03/05/2025 20.9     Monocytes % 03/05/2025 6.4     Eosinophils % 03/05/2025 1.5     Basophils % 03/05/2025 0.5     Neutrophils Absolute 03/05/2025 6.14     Immature Granulocytes Ab* 03/05/2025 0.04     Lymphocytes Absolute 03/05/2025 1.83     Monocytes Absolute 03/05/2025 0.56     Eosinophils Absolute 03/05/2025 0.13     Basophils Absolute 03/05/2025 0.04     Glucose 03/05/2025 114 (H)     Sodium 03/05/2025 140     Potassium 03/05/2025 3.3 (L)     Chloride 03/05/2025 106     Bicarbonate 03/05/2025 25     Anion Gap 03/05/2025 12     Urea Nitrogen 03/05/2025 22     Creatinine 03/05/2025 0.92     eGFR 03/05/2025 67     Calcium 03/05/2025 9.0     Albumin 03/05/2025 3.5     Alkaline Phosphatase 03/05/2025 51     Total Protein 03/05/2025 6.3 (L)     AST 03/05/2025 17     Bilirubin, Total 03/05/2025 0.6     ALT 03/05/2025 20     BNP 03/05/2025 56     Flu A Result 03/05/2025 Not Detected     Flu B Result 03/05/2025 Not Detected     Coronavirus 2019, PCR 03/05/2025 Not Detected     Troponin I, High Sensiti* 03/05/2025 8     Troponin I, High Sensiti* 03/05/2025 7        Assessment/Plan   The primary encounter diagnosis was Mitral valve insufficiency, unspecified etiology. Diagnoses of Mixed hyperlipidemia, Elevated coronary artery calcium score, Benign essential hypertension, and Tobacco dependence due to cigarettes were also pertinent to this visit.    Mixed hyperlipidemia  Dr. Rivera checked lipid panel in December: Total cholesterol 168, triglycerides 160, HDL 50 and LDL 85.  Continue the atorvastatin and ezetimibe therapy and will review lipid panels on epic in the future.    Mitral valve regurgitation  Mild to moderate mitral valve regurgitation in April 2022 and mild in April 2024.  Plan  was to repeat echocardiogram in April 2026 unless concerning symptoms arise.  Clinically stable at this time we will plan on repeating the echo in April 2026.    Benign essential hypertension  Blood pressure adequately controlled on the metoprolol losartan and chlorthalidone therapy.  Will continue to follow on a regular basis.    Elevated coronary artery calcium score  Continue standard risk factor modification and follow on a clinical basis.  No anginal type chest pain at this time.    Class 3 severe obesity due to excess calories without serious comorbidity with body mass index (BMI) of 40.0 to 44.9 in adult  BMI of 41.  Patient requesting trial of Wegovy to lose weight.  I informed her that it would likely would not be covered by her insurance but will send in a prescription for 0.25 mg weekly.  She will go to the pharmacy and see what the cost will be.    Tobacco dependence due to cigarettes  Smoking cessation urged.  Patient not ready to stop.

## 2025-04-11 NOTE — ASSESSMENT & PLAN NOTE
BMI of 41.  Patient requesting trial of Wegovy to lose weight.  I informed her that it would likely would not be covered by her insurance but will send in a prescription for 0.25 mg weekly.  She will go to the pharmacy and see what the cost will be.

## 2025-04-11 NOTE — ASSESSMENT & PLAN NOTE
Continue standard risk factor modification and follow on a clinical basis.  No anginal type chest pain at this time.

## 2025-04-11 NOTE — ASSESSMENT & PLAN NOTE
Blood pressure adequately controlled on the metoprolol losartan and chlorthalidone therapy.  Will continue to follow on a regular basis.

## 2025-04-17 ENCOUNTER — APPOINTMENT (OUTPATIENT)
Dept: AUDIOLOGY | Facility: CLINIC | Age: 70
End: 2025-04-17
Payer: MEDICARE

## 2025-04-20 DIAGNOSIS — F51.01 PRIMARY INSOMNIA: ICD-10-CM

## 2025-04-21 RX ORDER — DULOXETIN HYDROCHLORIDE 60 MG/1
60 CAPSULE, DELAYED RELEASE ORAL DAILY
Qty: 90 CAPSULE | Refills: 3 | Status: SHIPPED | OUTPATIENT
Start: 2025-04-21

## 2025-04-22 ENCOUNTER — APPOINTMENT (OUTPATIENT)
Dept: OTOLARYNGOLOGY | Facility: CLINIC | Age: 70
End: 2025-04-22
Payer: MEDICARE

## 2025-04-22 ENCOUNTER — APPOINTMENT (OUTPATIENT)
Dept: AUDIOLOGY | Facility: CLINIC | Age: 70
End: 2025-04-22
Payer: MEDICARE

## 2025-04-24 ENCOUNTER — DOCUMENTATION (OUTPATIENT)
Dept: PHYSICAL THERAPY | Facility: CLINIC | Age: 70
End: 2025-04-24
Payer: MEDICARE

## 2025-04-24 NOTE — PROGRESS NOTES
Discharge Summary    Name: Dyana Irene  MRN: [id number]  : 1955  Date: 25    Discharge Summary: PT    Discharge Information: Date of discharge 25 and Date of last visit 25    Therapy Summary: Pt not to therapy since above date. Pt canceled/did not attend any remaining sessions. Pt had to cancel numerous sessions due to onset of vertigo.     Rehab Discharge Reason: Failed to schedule and/or keep follow-up appointment(s)

## 2025-05-13 ENCOUNTER — TELEPHONE (OUTPATIENT)
Dept: PAIN MEDICINE | Facility: CLINIC | Age: 70
End: 2025-05-13
Payer: MEDICARE

## 2025-05-13 NOTE — TELEPHONE ENCOUNTER
Pt called to apologize for missing her appointment she had today.  She got the times wrong.  Pt is requesting an order for aqua therapy.  Her next appointment with you is July 3.

## 2025-05-14 DIAGNOSIS — M47.817 LUMBOSACRAL SPONDYLOSIS WITHOUT MYELOPATHY: Primary | ICD-10-CM

## 2025-06-04 ENCOUNTER — APPOINTMENT (OUTPATIENT)
Dept: OTOLARYNGOLOGY | Facility: CLINIC | Age: 70
End: 2025-06-04
Payer: MEDICARE

## 2025-06-04 ENCOUNTER — TELEPHONE (OUTPATIENT)
Dept: OTOLARYNGOLOGY | Facility: CLINIC | Age: 70
End: 2025-06-04

## 2025-06-04 NOTE — TELEPHONE ENCOUNTER
LOV 4/1/2025 fu [facility] for dizziness. She had to cancel her appt this AM because of an active vertigo episode. She is starting to feel better, but she woke with nausea this at 7 and had room spinning vertigo 8:30 - 9am. She is requesting a referral for vestibular rehab if possible. Your note also mentioned repeating an MRI with additional cuts, when I mentioned that to her she would like to avoid that if possible. Or does she just need an office visit rescheduled?

## 2025-06-05 ENCOUNTER — TELEPHONE (OUTPATIENT)
Dept: OTOLARYNGOLOGY | Facility: CLINIC | Age: 70
End: 2025-06-05
Payer: MEDICARE

## 2025-06-05 DIAGNOSIS — R49.0 HOARSENESS: Primary | ICD-10-CM

## 2025-06-18 ENCOUNTER — APPOINTMENT (OUTPATIENT)
Dept: PHYSICAL THERAPY | Facility: CLINIC | Age: 70
End: 2025-06-18
Payer: MEDICARE

## 2025-06-23 ENCOUNTER — APPOINTMENT (OUTPATIENT)
Dept: OTOLARYNGOLOGY | Facility: CLINIC | Age: 70
End: 2025-06-23
Payer: MEDICARE

## 2025-06-24 ENCOUNTER — EVALUATION (OUTPATIENT)
Dept: PHYSICAL THERAPY | Facility: CLINIC | Age: 70
End: 2025-06-24
Payer: MEDICARE

## 2025-06-24 DIAGNOSIS — M47.817 LUMBOSACRAL SPONDYLOSIS WITHOUT MYELOPATHY: Primary | ICD-10-CM

## 2025-06-24 PROCEDURE — 97110 THERAPEUTIC EXERCISES: CPT | Mod: GP

## 2025-06-24 PROCEDURE — 97161 PT EVAL LOW COMPLEX 20 MIN: CPT | Mod: GP

## 2025-06-24 ASSESSMENT — PAIN SCALES - GENERAL: PAINLEVEL_OUTOF10: 5 - MODERATE PAIN

## 2025-06-24 ASSESSMENT — PAIN DESCRIPTION - DESCRIPTORS: DESCRIPTORS: ACHING;SHARP;SHOOTING

## 2025-06-24 ASSESSMENT — PAIN - FUNCTIONAL ASSESSMENT: PAIN_FUNCTIONAL_ASSESSMENT: 0-10

## 2025-06-24 NOTE — PROGRESS NOTES
Physical Therapy Evaluation    Patient Name: Dyana Irene  MRN: [id number]  Evaluation Date: 6/24/2025  Time Calculation  Start Time: 0905  Stop Time: 0940  Time Calculation (min): 35 min  PT Evaluation Time Entry  PT Evaluation (Low) Time Entry: 25     PT Therapeutic Procedures Time Entry  Therapeutic Exercise Time Entry: 10    Problem List Items Addressed This Visit           ICD-10-CM    Lumbosacral spondylosis without myelopathy - Primary M47.817    Relevant Orders    Follow Up In Physical Therapy         Subjective   Patient reported hx of condition: Pt with chronic hx of LBP leading into MILD procedure 10/30/24. Pt had aquatic PT following until February of this year, but then had to cancel remaining appointments due to issues with vestibular system. Pt returns now for aquatic therapy to address pain and dysfunction. Pt had made some progress with PT with lower pain levels and better activity tolerance, but has worsened significantly since last visit. She is severely limited in standing and walking, lasting only a few minutes at a time. She has a lot of difficulty getting things done around the house. She is hoping aquatics can be helpful again.     Pt will also have a PT eval coming up to address vestibular issues.     Precautions:  Precautions  Precautions Comment: None - PT WILL HAVE TWO PT PLANS OPEN, PLEASE SELECT CORRECT EPISODE FOR EACH VISIT    Relevant PMH:  Hx B TKA, fibromyalgia, stenosis, arthritis, HTN, emphysema    Red Flags: Do you have any of the following? No  Fever/chills, unexplained weight changes, dizziness/fainting, unexplained change in bowel or bladder functions, unexplained malaise or muscle weakness, night pain/sweats, numbness or tingling    Pain:  Pain Assessment: 0-10  0-10 (Numeric) Pain Score: 5 - Moderate pain (Pain at max 10/10)  Pain Type: Chronic pain  Pain Location: Back  Pain Orientation: Lower  Pain Radiating Towards: can radiate into BLE  Pain Descriptors: Aching,  Sharp, Shooting  Pain Frequency: Constant/continuous  Effect of Pain on Daily Activities: Pain present during/limits: ADLs, household tasks such as cooking and cleaning, stnading to a few minutes at a time, walking to a few minutes at a time, lifting    Home Living:  Home type: House  Stairs: Yes 2 stairs to enter from garage  Lives with: Alone  Occupation: retired  Hobbies/activities: walking and exercising - has been limited by pain    Prior Function Per Pt/Caregiver Report:  Prior Function Comments: Chronic hx of pain and decline in functional tolerance leading into 10/30/24 surgery    Patient's Goal for Therapy:  Improve walking, reduce pain, improve balance     OBJECTIVE:  Objective   Posture:  Posture Comment: Forward head, rounded shoudlers, mild trunk flexion  Range of Motion:  Lumbar ROM Range   Flexion 50% limited, tight    Extension 75% limited, painful   R rotation 50% limited, tight    L rotation 50% limited, tight    R sidebend 50% limited, tight    L sidebend 50% limited, tight      Strength:  LE MMT L R   Hip flexion 4/5 4/5   Hip extension 3+/5 3+/5   Hip abduction 3+/5 3+/5   Hip adduction 4/5 4/5   Knee flexion 4/5 4/5   Knee extension 4/5 4/5     Flexibility:  Flexibility Comment: Tight B piriformis, gastrocs  Palpation:  Palpation Comment: TTP B lumbar paraspinals, SIJ, glutes  Special Tests:  Special Tests Comment: Negative slump, reapeated movements, SLR, piriformis  Gait:  Gait Comment: Flexed posture, decreased B hip and knee ext at stance. Distances limited by pain  Balance:  Position Eyes Open   Narrow Base of Support 30   R over L Tandem 10   L over R Tandem 10   R SLS Unable   L SLS Unable       Stairs:  Stairs Comment: Performs nonreciprocally with pain  Bed Mobility:  Bed Mobility Comment: Independent with pain  Transfers:  Transfers Comment: Independent with pain  Other:  Comment: Sensation intact    Outcome Measures:  Other Measures  5x Sit to Stand: 15 (no UE support, some pain,  some muscle fatigue)  Oswestry Disablity Index (ELSIE): 20     Assessment  PT Assessment Results: Decreased strength, Decreased range of motion, Decreased mobility, Pain  Rehab Prognosis: Good    Pt is a 70 y.o. female who presents with impairments listed above. These impairments have led to functional limitations including pain with ADLs as well as limited tolerance of standing, walking, lifting, and household tasks. Pt would benefit from skilled physical therapy intervention to improve above impairments and facilitate return to function.    Complexity of Evaluation: Low    Based on the history including personal factors and/or comorbidities, examination of body systems including body structures and function, activity limitations, and/or participation restrictions, as well as clinical presentation, patient meets criteria for above complexity evaluation.    Plan  Treatment/Interventions: Electrical stimulation, Manual therapy, Neuromuscular re-education, Taping techniques, Therapeutic activities, Therapeutic exercises, Ultrasound, Aquatic therapy  PT Plan: Skilled PT  PT Frequency: 2 times per week  Duration: 10 visits  Certification Period Start Date: 06/24/25  Certification Period End Date: 09/22/25  Number of Treatments Authorized: [place]  Rehab Potential: Good  Plan of Care Agreement: Patient    Insurance Plan: Payor: MEDICARE / Plan: MEDICARE PART A AND B / Product Type: *No Product type* /     Plan for next visit: initiate aquatics to address pain, mobility, posture, function     OP EDUCATION:  Outpatient Education  Individual(s) Educated: Patient  Education Provided: Body Mechanics, Anatomy, Home Exercise Program, POC, Posture  Diagnosis and Precautions: LBP with B radiculopathy  Risk and Benefits Discussed with Patient/Caregiver/Other: yes  Patient/Caregiver Demonstrated Understanding: yes  Plan of Care Discussed and Agreed Upon: yes  Patient Response to Education: Patient/Caregiver Performed Return Demonstration  of Exercises/Activities, Patient/Caregiver Verbalized Understanding of Information, Patient/Caregiver Asked Appropriate Questions    Today's Treatment:  Therapeutic Exercise  HEP issued, demonstrated, instructed pt in exercises, exercises include:  Access Code: [email or web address]  URL: [email or web address]  Date: 06/24/2025  Prepared by: Raymond Huddleston    Exercises  - Seated Transversus Abdominis Bracing  - 1 x daily - 7 x weekly - 2 sets - 10 reps - 3 second hold  - Seated Scapular Retraction  - 1 x daily - 7 x weekly - 2 sets - 10 reps - 3-5 seond hold  - Seated Flexion Stretch with Swiss Ball  - 1 x daily - 7 x weekly - 2 sets - 10 reps  - Seated March  - 1 x daily - 7 x weekly - 2 sets - 10 reps  - Seated Long Arc Quad  - 1 x daily - 7 x weekly - 2 sets - 10 reps  - Seated Hip Adduction Isometrics with Ball  - 1 x daily - 7 x weekly - 2 sets - 10 reps  - Seated Hip Abduction with Resistance  - 1 x daily - 7 x weekly - 2 sets - 10 reps    Goals:  Active       PT Problem       STG       Start:  06/24/25    Expected End:  08/08/25       Pt will be independent in HEP to improve LE and core strength, mobility, and function.  Pt will report 50% reduction in pain with functional mobility such as standing, walking, and stairs.         Pt will increase strength in core and LEs by 1/2 MMT in all planes for improved performance of functional mobility.         Start:  06/24/25    Expected End:  09/22/25            Pt will demonstrate no more than 25% limitation in lumbar AROM without pain in all planes for improved ability to reach objects on floor.       Start:  06/24/25    Expected End:  09/22/25            Pt will ambulate moderate community distances across all surfaces without deviation and without pain.        Start:  06/24/25    Expected End:  09/22/25            Pt will stand for >30 minutes with UE support PRN without increase in pain for improved tolerance to household tasks such as cooking.         Start:  06/24/25     Expected End:  09/22/25            Pt will perform 5x sit to  11s without pain for improved functional strength and mobility.       Start:  06/24/25    Expected End:  09/22/25

## 2025-07-02 ENCOUNTER — CLINICAL SUPPORT (OUTPATIENT)
Dept: PHYSICAL THERAPY | Facility: CLINIC | Age: 70
End: 2025-07-02
Payer: MEDICARE

## 2025-07-02 DIAGNOSIS — R42 DIZZINESS: Primary | ICD-10-CM

## 2025-07-02 DIAGNOSIS — R49.0 HOARSENESS: ICD-10-CM

## 2025-07-02 PROCEDURE — 97162 PT EVAL MOD COMPLEX 30 MIN: CPT | Mod: GP | Performed by: PHYSICAL THERAPIST

## 2025-07-02 PROCEDURE — 97110 THERAPEUTIC EXERCISES: CPT | Mod: GP | Performed by: PHYSICAL THERAPIST

## 2025-07-02 NOTE — PROGRESS NOTES
"Physical Therapy Evaluation    Patient Name: Dyana Irene \"Marianne\"  MRN: [id number]  Evaluation Date: 7/2/2025  Time Calculation  Start Time: 1430  Stop Time: 1510  Time Calculation (min): 40 min    Current Problem  Problem List Items Addressed This Visit           ICD-10-CM    Dizziness - Primary R42    Relevant Orders    Follow Up In Physical Therapy     Other Visit Diagnoses         Codes      Hoarseness     R49.0    Relevant Orders    Follow Up In Physical Therapy          06/23/2025: NO AUTH, MEDICARE A/B, [place], ($314.20 USED PT/ST OF $2410 MEDICARE CAP)     Subjective   General:   Started 6/2024-first vertigo and nausea attack.  More recently, patient did have a vertigo attack with nausea day before easter as she was doing meal prep, and then again in May got LH, N and then dizzy-when sitting while using her I-pad.  Her vertigo attacks go away when she lyes back in recliner.  Chronic L > R tinnitus but much better now, no vertigo attacks since May.  Still gets lightheaded at times.  Some nausea persists at times.  Off balance.   They vertigo attacks might be crystals.  Chronic congestion intermittently.  Patient has not slept regularly in bed for years, uses a recliner due to low back pain.  Patient did have MILD procedure for low back October 2020.  Per recent ENT note:  She a variety of abnormalities including spontaneous nystagmus and evidence of peripheral weakness bilaterally   She had MRI of the brain and IAC which revealedNo acute infarct, hemorrhage, or mass of the brain.   She did have focal enhancement at the apex of the right internal auditory canal in the region of the 7th cranial nerve and the ostium of the left internal Otocort canal with focal enhancement posteriorly.  Those findings were nonspecific.     Red Flags: Denies    Precautions:    Pt WILL HAVE TWO PT PLANS OPEN, PLEASE SELECT CORRECT EPISODE FOR EACH VISIT  eustachian tube dysfunction and retraction on the left appears stable. "    Relevant PMH:  Hx: eustachian tube dysfunction , spontaneous nystagmus, B TKA, fibromyalgia, stenosis, arthritis, HTN, emphysema, chronic pain    For fall risk detail see recently completed STEADI under Episode section of EMR  Pain:   Lb 5-chronic  Home Living:     Pt gets around home safely    Work: Retired    Prior Function Per Pt/Caregiver Report:   No vertigo attacks before last year  Pt goals: Eliminate vertigo attacks    OBJECTIVE:  If left blank, assume NT:  Vestibular/Balance Assessment:    Occulomotor, Vertigo Tests, etc. +/-   Smooth pursuits    Saccades    VOR    Sharp-Jose Manuel Test    Wolfgang-Hallpike L -   Paisley-Hallpike R -   Roll Test R -   Roll Test L -   JPSE of C-spine    [place] Convergence    HIT    Head shake test             Range of Motion:     Mod decr C-spine ext, wfl C-rot    Special Tests:     Gait:   Flexed posture, independent  Stairs:     Bed Mobility:   Independent  Transfers:   Independent  Other:       Outcome Measures:  DHI = 38    OP EDUCATION:     Pt ed on PT POC and agrees    HEP:  Access Code: [email or web address]  URL: [email or web address]  Date: 07/02/2025  Prepared by: Ernesto Patel    Exercises  - Seated Gaze Stabilization with Head Rotation  - 3 x daily - 7 x weekly - 1 sets - 3 reps  - Seated Gaze Stabilization with Head Nod  - 3 x daily - 7 x weekly - 1 sets - 3 reps    VOR should be done in sets of 20- 30 seconds    Today's Treatment:  Therapeutic Exercise  8'-patient performs and/or as instructed on above HEP issued today.    Assessment       pt is occasional vertigo and nausea attacks since last summer, occasional nausea without vertigo, and a little bit more frequently gets lightheaded, as well as having bilateral peripheral weakness from a vestibular standpoint and needs PT for vestibular rehabilitation as well as decreasing incidence of vertigo attacks.  However, it is unclear if her vestibular exercises will eliminate her occasional vertigo/nausea attacks or not, as she  "is negative for BPPV, and the attacks are infrequent.      Based on the history including personal factors and/or comorbidities, examination of body systems including body structures and function, activity limitations, and/or participation restrictions, as well as clinical presentation, patient meets criteria for a moderate complexity evaluation.    Plan     Next session assess HEP technique, and response to this, as well as ensure she is performing for 20 to 30 seconds each rep of VOR.  May consider blood pressure checks as well.    Skilled PT consisting of:  therapeutic exercise, therapeutic activity, NMR, manual, thermal, electric stimulation, US, light therapy, gait training, canalith repositioning as needed  Rehab Potential: good  Frequency:  1x/wk  Duration: 20 weeks  Medicare cert date: 7/2/2025 to 9/26/2025    Goals:      STG: in 3 visits   Pt to be I in initial HEP.      LTG\"s:  in 6 visits  Patient to state no more vertigo attacks  Improve score on outcome form by 10 points to show incr in function.  Patient to state no dizziness with VOR      Some of This note was created using voice recognition software and was not corrected for typographical or grammatical errors.   "

## 2025-07-03 ENCOUNTER — OFFICE VISIT (OUTPATIENT)
Facility: CLINIC | Age: 70
End: 2025-07-03
Payer: MEDICARE

## 2025-07-03 VITALS
SYSTOLIC BLOOD PRESSURE: 122 MMHG | WEIGHT: 251 LBS | HEIGHT: 65 IN | BODY MASS INDEX: 41.82 KG/M2 | DIASTOLIC BLOOD PRESSURE: 66 MMHG | RESPIRATION RATE: 16 BRPM

## 2025-07-03 DIAGNOSIS — G89.29 OTHER CHRONIC PAIN: ICD-10-CM

## 2025-07-03 DIAGNOSIS — G54.1 LUMBOSACRAL PLEXOPATHY: Primary | ICD-10-CM

## 2025-07-03 DIAGNOSIS — M54.16 LUMBAR RADICULOPATHY: ICD-10-CM

## 2025-07-03 PROCEDURE — G2211 COMPLEX E/M VISIT ADD ON: HCPCS | Performed by: ANESTHESIOLOGY

## 2025-07-03 PROCEDURE — 1159F MED LIST DOCD IN RCRD: CPT | Performed by: ANESTHESIOLOGY

## 2025-07-03 PROCEDURE — 1160F RVW MEDS BY RX/DR IN RCRD: CPT | Performed by: ANESTHESIOLOGY

## 2025-07-03 PROCEDURE — 3074F SYST BP LT 130 MM HG: CPT | Performed by: ANESTHESIOLOGY

## 2025-07-03 PROCEDURE — 1125F AMNT PAIN NOTED PAIN PRSNT: CPT | Performed by: ANESTHESIOLOGY

## 2025-07-03 PROCEDURE — 3008F BODY MASS INDEX DOCD: CPT | Performed by: ANESTHESIOLOGY

## 2025-07-03 PROCEDURE — 99214 OFFICE O/P EST MOD 30 MIN: CPT | Performed by: ANESTHESIOLOGY

## 2025-07-03 PROCEDURE — 3078F DIAST BP <80 MM HG: CPT | Performed by: ANESTHESIOLOGY

## 2025-07-03 RX ORDER — LIDOCAINE 50 MG/G
1 PATCH TOPICAL DAILY
Qty: 30 PATCH | Refills: 1 | Status: SHIPPED | OUTPATIENT
Start: 2025-07-03 | End: 2025-08-02

## 2025-07-03 ASSESSMENT — ENCOUNTER SYMPTOMS
ALLERGIC/IMMUNOLOGIC NEGATIVE: 1
PSYCHIATRIC NEGATIVE: 1
RESPIRATORY NEGATIVE: 1
EYES NEGATIVE: 1
ENDOCRINE NEGATIVE: 1
NEUROLOGICAL NEGATIVE: 1
CARDIOVASCULAR NEGATIVE: 1
BACK PAIN: 1
HEMATOLOGIC/LYMPHATIC NEGATIVE: 1
GASTROINTESTINAL NEGATIVE: 1
CONSTITUTIONAL NEGATIVE: 1

## 2025-07-03 ASSESSMENT — PAIN DESCRIPTION - DESCRIPTORS: DESCRIPTORS: ACHING

## 2025-07-03 ASSESSMENT — PAIN SCALES - GENERAL
PAINLEVEL_OUTOF10: 6
PAINLEVEL_OUTOF10: 6

## 2025-07-03 ASSESSMENT — PAIN - FUNCTIONAL ASSESSMENT: PAIN_FUNCTIONAL_ASSESSMENT: 0-10

## 2025-07-03 NOTE — PROGRESS NOTES
Subjective   Patient ID: Dyana Irene is a 70 y.o. female who presents for Back Pain.  Back Pain    Patient here today for management of her low back and radiating leg pain.  She underwent the mild procedure last fall and she had about a 20% reduction in her pain.  However she still has significant immobility of standing and walking.  She did undergo recent surgical consultation within the last 12 months and said she is not a candidate for any additional surgery.  She has severe neuropathic pain in both of her legs as well as in her back.  The longer she walks she will get progressive weakness.  She has numbness and tingling in her legs.  She reports that interrupts her whole quality of life.  She is gaining weight because she is not as active.  She is looking for any help or any answer.  She does not want to be any oral medications as she has significant side effects from them.  She is open to any option that potentially could help her chronic pain.    Review of Systems   Constitutional: Negative.    HENT: Negative.     Eyes: Negative.    Respiratory: Negative.     Cardiovascular: Negative.    Gastrointestinal: Negative.    Endocrine: Negative.    Genitourinary: Negative.    Musculoskeletal:  Positive for back pain.   Skin: Negative.    Allergic/Immunologic: Negative.    Neurological: Negative.    Hematological: Negative.    Psychiatric/Behavioral: Negative.         Objective   Physical Exam  Vitals and nursing note reviewed.   Constitutional:       Appearance: Normal appearance.   HENT:      Head: Normocephalic and atraumatic.      Right Ear: Ear canal and external ear normal.      Left Ear: Ear canal and external ear normal.      Nose: Nose normal.      Mouth/Throat:      Mouth: Mucous membranes are moist.      Pharynx: Oropharynx is clear.   Eyes:      Conjunctiva/sclera: Conjunctivae normal.      Pupils: Pupils are equal, round, and reactive to light.   Cardiovascular:      Rate and Rhythm: Normal rate.    Pulmonary:      Effort: Pulmonary effort is normal. No respiratory distress.   Musculoskeletal:      Cervical back: Normal range of motion and neck supple.      Thoracic back: Normal.      Lumbar back: No spasms or tenderness. Decreased range of motion. Negative right straight leg raise test and negative left straight leg raise test. No scoliosis.        Back:       Comments: SI joint provocation positive bilaterally  Gaenslen sign positive bilaterally  ESTEFANY sign positive bilaterally  Jacob finger positive bilaterally   Skin:     General: Skin is warm and dry.   Neurological:      Mental Status: She is alert and oriented to person, place, and time.      Sensory: Sensory deficit (BL L5) present.      Motor: Weakness (over all deconditioned) present.      Coordination: Coordination is intact.      Gait: Gait abnormal.      Deep Tendon Reflexes:      Reflex Scores:       Patellar reflexes are 2+ on the right side and 2+ on the left side.     Comments:   Patient can stand from seated position unassisted.  Patient walks with lumbar flexed gait.   Psychiatric:         Mood and Affect: Mood normal.         Thought Content: Thought content normal.         Assessment/Plan   Problem List Items Addressed This Visit           ICD-10-CM    Other chronic pain G89.29    Relevant Medications    lidocaine (Lidoderm) 5 % patch    Other Relevant Orders    Stimulator Lead Trial    FL pain management     Other Visit Diagnoses         Codes      Lumbosacral plexopathy    -  Primary G54.1    Relevant Medications    lidocaine (Lidoderm) 5 % patch    Other Relevant Orders    Stimulator Lead Trial    FL pain management      Lumbar radiculopathy     M54.16    Relevant Medications    lidocaine (Lidoderm) 5 % patch             I nice discussion with the patient today our plan will be as follows.    Radiology: All available imaging was reviewed today.    Physically: Patient to continue with home exercise program.    Psychologically: Patient  to complete behavioral health assessment to move forward with trial of spinal cord stimulation.    Medication: No changes at this time.    Duration: Greater than 1 year.    Intervention: At this time given that the patient has tried and failed numerous therapies as well as surgical intervention is no longer a surgical candidate.  Will move forward with a trial of spinal cord stimulation with [employer].  Risks, benefit, and alternatives of the procedure were discussed with the patient.  Oswestry score has been compelted and recorded.        Please note that this report has been produced using speech recognition software. It may contain errors related to grammar, punctuation or spelling. Electronically signed, but not reviewed.       Osman Lux MD 07/03/25 11:04 AM

## 2025-07-07 ENCOUNTER — TREATMENT (OUTPATIENT)
Dept: PHYSICAL THERAPY | Facility: CLINIC | Age: 70
End: 2025-07-07
Payer: MEDICARE

## 2025-07-07 DIAGNOSIS — M47.817 LUMBOSACRAL SPONDYLOSIS WITHOUT MYELOPATHY: ICD-10-CM

## 2025-07-07 PROCEDURE — 97113 AQUATIC THERAPY/EXERCISES: CPT | Mod: GP

## 2025-07-07 ASSESSMENT — PAIN SCALES - GENERAL: PAINLEVEL_OUTOF10: 6

## 2025-07-07 ASSESSMENT — PAIN - FUNCTIONAL ASSESSMENT: PAIN_FUNCTIONAL_ASSESSMENT: 0-10

## 2025-07-07 NOTE — PROGRESS NOTES
Physical Therapy Treatment    Patient Name: Dyana Irene  MRN: [id number]  Encounter date:  7/7/2025                Visit Number:  2 (including evaluation)  Planned total visits: 10 visits  Visits Authorized/Insurance Coverage:  NO AUTH, [place]IC[place] A/[place], [place]314[place]20 USED PT/ST OF $2410 MEDICARE CAP)     Current Problem  Problem List Items Addressed This Visit           ICD-10-CM    Lumbosacral spondylosis without myelopathy M47.817         Precautions  Precautions  Precautions Comment: None - PT WILL HAVE TWO PT PLANS OPEN, PLEASE SELECT CORRECT EPISODE FOR EACH VISIT    Pain  Pain Assessment: 0-10  0-10 (Numeric) Pain Score: 6    Subjective  Pain has been high recently, worsened over the weekend from sitting on bad chairs. She is hoping the water will help relieve symptoms.     Objective  Flexed posture with short, antalgic steps B ambulating on pool deck    Treatment:  Aquatic Exercise  Aquatic Exercise Performed: Yes  Walk x3 laps fwd, bwd, s/s across pool    At rail, 4' depth, cues for TrA brace, x15 ea:  Heel raise  Hip abduction  Hip extension  Hip flexion/SLR  Hamstring curl  March in place  Mini squat    Deep, 5' with noodle under arms, BUE support:  Decompression x2'  Hip abd/adduction x2'  XC ski x2'  Bike x2'  Decompression x2'      Current HEP:  Access Code: [email or web address]  URL: [email or web address]  Date: 06/24/2025  Prepared by: Raymond Huddleston    Exercises  - Seated Transversus Abdominis Bracing  - 1 x daily - 7 x weekly - 2 sets - 10 reps - 3 second hold  - Seated Scapular Retraction  - 1 x daily - 7 x weekly - 2 sets - 10 reps - 3-5 seond hold  - Seated Flexion Stretch with Swiss Ball  - 1 x daily - 7 x weekly - 2 sets - 10 reps  - Seated March  - 1 x daily - 7 x weekly - 2 sets - 10 reps  - Seated Long Arc Quad  - 1 x daily - 7 x weekly - 2 sets - 10 reps  - Seated Hip Adduction Isometrics with Ball  - 1 x daily - 7 x weekly - 2 sets - 10 reps  - Seated Hip Abduction with Resistance   - 1 x daily - 7 x weekly - 2 sets - 10 reps    Has patient been consistent with performance of HEP? Yes    Assessment:  Pt's response to treatment:  aquatics initiated to address pain and mobility. Pt with pain with mobility on pool deck but had good relief once in pool. Pain down somewhat at exit.   Areas of improvements:  pain relief from visit   Limitations/deficits:  activity tolerance at home     Pain end of session: 4/10    Plan:  OP PT Plan  Treatment/Interventions: Electrical stimulation, Manual therapy, Neuromuscular re-education, Taping techniques, Therapeutic activities, Therapeutic exercises, Ultrasound, Aquatic therapy  PT Plan: Skilled PT  PT Frequency: 2 times per week  Duration: 10 visits  Certification Period Start Date: 06/24/25  Certification Period End Date: 09/22/25  Number of Treatments Authorized: [place]  Rehab Potential: Good  Plan of Care Agreement: Patient  Continue with current POC/no changes    Assessment of current progress against goals:  Insufficient treatment time to assess progress    Goals:  Active       PT Problem       STG       Start:  06/24/25    Expected End:  08/08/25       Pt will be independent in HEP to improve LE and core strength, mobility, and function.  Pt will report 50% reduction in pain with functional mobility such as standing, walking, and stairs.         Pt will increase strength in core and LEs by 1/2 MMT in all planes for improved performance of functional mobility.         Start:  06/24/25    Expected End:  09/22/25            Pt will demonstrate no more than 25% limitation in lumbar AROM without pain in all planes for improved ability to reach objects on floor.       Start:  06/24/25    Expected End:  09/22/25            Pt will ambulate moderate community distances across all surfaces without deviation and without pain.        Start:  06/24/25    Expected End:  09/22/25            Pt will stand for >30 minutes with UE support PRN without increase in pain for  improved tolerance to household tasks such as cooking.         Start:  06/24/25    Expected End:  09/22/25            Pt will perform 5x sit to  11s without pain for improved functional strength and mobility.       Start:  06/24/25    Expected End:  09/22/25

## 2025-07-11 NOTE — PROGRESS NOTES
Physical Therapy Treatment    Patient Name: Dyana Irene  MRN: [id number]  Encounter date:  7/14/2025                Visit Number:  3 (including evaluation)  Planned total visits:    Visits Authorized/Insurance Coverage:  NO AUTH, MED[place] A/B, [place], ($314.20 USED PT/ST OF $2410 MEDICARE CAP)     Current Problem  Problem List Items Addressed This Visit    None          Precautions       Pain       Subjective  ***    Objective  ***    Treatment:     Walk x3 laps fwd, bwd, s/s across pool    At rail, 4' depth, cues for TrA brace, x15 ea:  Heel raise  Hip abduction  Hip extension  Hip flexion/SLR  Hamstring curl  March in place  Mini squat    Deep, 5' with noodle under arms, BUE support:  Decompression x2'  Hip abd/adduction x2'  XC ski x2'  Bike x2'  Decompression x2'      Current HEP:  Access Code: [email or web address]  URL: [email or web address]  Date: 06/24/2025  Prepared by: Raymond Huddleston    Exercises  - Seated Transversus Abdominis Bracing  - 1 x daily - 7 x weekly - 2 sets - 10 reps - 3 second hold  - Seated Scapular Retraction  - 1 x daily - 7 x weekly - 2 sets - 10 reps - 3-5 seond hold  - Seated Flexion Stretch with Swiss Ball  - 1 x daily - 7 x weekly - 2 sets - 10 reps  - Seated March  - 1 x daily - 7 x weekly - 2 sets - 10 reps  - Seated Long Arc Quad  - 1 x daily - 7 x weekly - 2 sets - 10 reps  - Seated Hip Adduction Isometrics with Ball  - 1 x daily - 7 x weekly - 2 sets - 10 reps  - Seated Hip Abduction with Resistance  - 1 x daily - 7 x weekly - 2 sets - 10 reps    Has patient been consistent with performance of HEP? Yes    Assessment:  Pt's response to treatment: ***  Areas of improvements: ***  Limitations/deficits: ***    Pain end of session: ***/10    Plan:     Continue with current POC/no changes    Assessment of current progress against goals:  Insufficient treatment time to assess progress    Goals:  Active       PT Problem       STG       Start:  06/24/25    Expected End:  08/08/25        Pt will be independent in HEP to improve LE and core strength, mobility, and function.  Pt will report 50% reduction in pain with functional mobility such as standing, walking, and stairs.         Pt will increase strength in core and LEs by 1/2 MMT in all planes for improved performance of functional mobility.         Start:  06/24/25    Expected End:  09/22/25            Pt will demonstrate no more than 25% limitation in lumbar AROM without pain in all planes for improved ability to reach objects on floor.       Start:  06/24/25    Expected End:  09/22/25            Pt will ambulate moderate community distances across all surfaces without deviation and without pain.        Start:  06/24/25    Expected End:  09/22/25            Pt will stand for >30 minutes with UE support PRN without increase in pain for improved tolerance to household tasks such as cooking.         Start:  06/24/25    Expected End:  09/22/25            Pt will perform 5x sit to  11s without pain for improved functional strength and mobility.       Start:  06/24/25    Expected End:  09/22/25

## 2025-07-14 ENCOUNTER — APPOINTMENT (OUTPATIENT)
Dept: PHYSICAL THERAPY | Facility: CLINIC | Age: 70
End: 2025-07-14
Payer: MEDICARE

## 2025-07-15 NOTE — PROGRESS NOTES
Physical Therapy Treatment    Patient Name: Dyana Irene  MRN: [id number]  Encounter date:  7/16/2025  Time Calculation  Start Time: 0815  Stop Time: 0859  Time Calculation (min): 44 min     PT Therapeutic Procedures Time Entry  Aquatic Therapy Time Entry: 44       Visit Number:  3 (including evaluation)  Planned total visits: 10 visits  Visits Authorized/Insurance Coverage:  NO AUTH, MEDIC[place] A/B, [place]314.20 USED PT/ST OF $2410 MEDICARE CAP)     Current Problem  Problem List Items Addressed This Visit           ICD-10-CM    Lumbosacral spondylosis without myelopathy M47.817       Precautions  Precautions  Precautions Comment: None - PT WILL HAVE TWO PT PLANS OPEN, PLEASE SELECT CORRECT EPISODE FOR EACH VISIT    Pain  Pain Assessment: 0-10  0-10 (Numeric) Pain Score: 6    Subjective  Back has been a little better since last visit but remains painful, especially in mornings. She is eager to get back into the pool.     Objective  Flexed trunk posture on pool deck and in pool    Treatment:  Aquatic Exercise  Aquatic Exercise Performed: Yes  Aquatic Exercise Activity 1: Walk x3 laps fwd, bwd, s/s across pool  Aquatic Exercise Activity 2: At rail, 4' depth, cues for TrA brace, x15 ea:  Aquatic Exercise Activity 3: Heel raise  Aquatic Exercise Activity 4: Hip abduction  Aquatic Exercise Activity 5: Hip extension  Aquatic Exercise Activity 6: Hip flexion/SLR  Aquatic Exercise Activity 7: Hamstring curl  Aquatic Exercise Activity 8: March in place  Aquatic Exercise Activity 9: Deep, 5' with noodle under arms, BUE support:  Aquatic Exercise Activity 10: Decompression x2'  Aquatic Exercise Activity 11: Hip abd/adduction x2'  Aquatic Exercise Activity 12: XC ski x2'  Aquatic Exercise Activity 13: Bike x2'  Aquatic Exercise Activity 14: Decompression x2'    Current HEP:  Access Code: [email or web address]  URL: [email or web address]  Date: 06/24/2025  Prepared by: Raymond Huddleston    Exercises  - Seated Transversus  Abdominis Bracing  - 1 x daily - 7 x weekly - 2 sets - 10 reps - 3 second hold  - Seated Scapular Retraction  - 1 x daily - 7 x weekly - 2 sets - 10 reps - 3-5 seond hold  - Seated Flexion Stretch with Swiss Ball  - 1 x daily - 7 x weekly - 2 sets - 10 reps  - Seated March  - 1 x daily - 7 x weekly - 2 sets - 10 reps  - Seated Long Arc Quad  - 1 x daily - 7 x weekly - 2 sets - 10 reps  - Seated Hip Adduction Isometrics with Ball  - 1 x daily - 7 x weekly - 2 sets - 10 reps  - Seated Hip Abduction with Resistance  - 1 x daily - 7 x weekly - 2 sets - 10 reps    Has patient been consistent with performance of HEP? Yes    Assessment:  Pt's response to treatment: aquatics continued to address pain and mobility. Pain better once in pool and pt showed better gait pattern but did remain with some flexed posture. L hip ROM also increased pain but this did not linger.   Areas of improvements: pain relief   Limitations/deficits: posture, ambulation tolerance outside of pool     Pain end of session: 2/10    Plan:  OP PT Plan  Treatment/Interventions: Electrical stimulation, Manual therapy, Neuromuscular re-education, Taping techniques, Therapeutic activities, Therapeutic exercises, Ultrasound, Aquatic therapy  PT Plan: Skilled PT  PT Frequency: 2 times per week  Duration: 10 visits  Certification Period Start Date: 06/24/25  Certification Period End Date: 09/22/25  Number of Treatments Authorized: MN  Rehab Potential: Good  Plan of Care Agreement: Patient  Continue with current POC/no changes    Assessment of current progress against goals:  Insufficient treatment time to assess progress    Goals:  Active       PT Problem       STG       Start:  06/24/25    Expected End:  08/08/25       Pt will be independent in HEP to improve LE and core strength, mobility, and function.  Pt will report 50% reduction in pain with functional mobility such as standing, walking, and stairs.         Pt will increase strength in core and LEs by 1/2  MMT in all planes for improved performance of functional mobility.         Start:  06/24/25    Expected End:  09/22/25            Pt will demonstrate no more than 25% limitation in lumbar AROM without pain in all planes for improved ability to reach objects on floor.       Start:  06/24/25    Expected End:  09/22/25            Pt will ambulate moderate community distances across all surfaces without deviation and without pain.        Start:  06/24/25    Expected End:  09/22/25            Pt will stand for >30 minutes with UE support PRN without increase in pain for improved tolerance to household tasks such as cooking.         Start:  06/24/25    Expected End:  09/22/25            Pt will perform 5x sit to  11s without pain for improved functional strength and mobility.       Start:  06/24/25    Expected End:  09/22/25

## 2025-07-16 ENCOUNTER — TREATMENT (OUTPATIENT)
Dept: PHYSICAL THERAPY | Facility: CLINIC | Age: 70
End: 2025-07-16
Payer: MEDICARE

## 2025-07-16 DIAGNOSIS — M47.817 LUMBOSACRAL SPONDYLOSIS WITHOUT MYELOPATHY: ICD-10-CM

## 2025-07-16 PROCEDURE — 97113 AQUATIC THERAPY/EXERCISES: CPT | Mod: GP

## 2025-07-16 ASSESSMENT — PAIN - FUNCTIONAL ASSESSMENT: PAIN_FUNCTIONAL_ASSESSMENT: 0-10

## 2025-07-16 ASSESSMENT — PAIN SCALES - GENERAL: PAINLEVEL_OUTOF10: 6

## 2025-07-17 ENCOUNTER — TELEPHONE (OUTPATIENT)
Facility: CLINIC | Age: 70
End: 2025-07-17
Payer: MEDICARE

## 2025-07-17 NOTE — TELEPHONE ENCOUNTER
I called and left a message for the patient to call back and schedule her SCS trial with [employer].    Kalpana Brush

## 2025-07-18 DIAGNOSIS — F41.8 DEPRESSION WITH ANXIETY: ICD-10-CM

## 2025-07-18 RX ORDER — DULOXETIN HYDROCHLORIDE 30 MG/1
30 CAPSULE, DELAYED RELEASE ORAL DAILY
Qty: 90 CAPSULE | Refills: 3 | Status: SHIPPED | OUTPATIENT
Start: 2025-07-18 | End: 2026-07-13

## 2025-07-22 NOTE — PROGRESS NOTES
Physical Therapy Treatment    Patient Name: Dyana Irene  MRN: [id number]  Encounter date:  7/23/2025                Visit Number:  4 (including evaluation)  Planned total visits:    Visits Authorized/Insurance Coverage:  NO AUTH, [place] A/B[place]314.[place] USED PT/ST OF $2410 MEDICARE CAP)     Current Problem  Problem List Items Addressed This Visit    None        Precautions       Pain       Subjective  ***    Objective  ***    Treatment:       Current HEP:  Access Code: [email or web address]  URL: [email or web address]  Date: 06/24/2025  Prepared by: Raymond Huddleston    Exercises  - Seated Transversus Abdominis Bracing  - 1 x daily - 7 x weekly - 2 sets - 10 reps - 3 second hold  - Seated Scapular Retraction  - 1 x daily - 7 x weekly - 2 sets - 10 reps - 3-5 seond hold  - Seated Flexion Stretch with Swiss Ball  - 1 x daily - 7 x weekly - 2 sets - 10 reps  - Seated March  - 1 x daily - 7 x weekly - 2 sets - 10 reps  - Seated Long Arc Quad  - 1 x daily - 7 x weekly - 2 sets - 10 reps  - Seated Hip Adduction Isometrics with Ball  - 1 x daily - 7 x weekly - 2 sets - 10 reps  - Seated Hip Abduction with Resistance  - 1 x daily - 7 x weekly - 2 sets - 10 reps    Has patient been consistent with performance of HEP? Yes    Assessment:  Pt's response to treatment: ***  Areas of improvements: ***  Limitations/deficits: ***    Pain end of session: ***/10    Plan:     Continue with current POC/no changes    Assessment of current progress against goals:  Insufficient treatment time to assess progress    Goals:  Active       PT Problem       STG       Start:  06/24/25    Expected End:  08/08/25       Pt will be independent in HEP to improve LE and core strength, mobility, and function.  Pt will report 50% reduction in pain with functional mobility such as standing, walking, and stairs.         Pt will increase strength in core and LEs by 1/2 MMT in all planes for improved performance of functional mobility.          Start:  06/24/25    Expected End:  09/22/25            Pt will demonstrate no more than 25% limitation in lumbar AROM without pain in all planes for improved ability to reach objects on floor.       Start:  06/24/25    Expected End:  09/22/25            Pt will ambulate moderate community distances across all surfaces without deviation and without pain.        Start:  06/24/25    Expected End:  09/22/25            Pt will stand for >30 minutes with UE support PRN without increase in pain for improved tolerance to household tasks such as cooking.         Start:  06/24/25    Expected End:  09/22/25            Pt will perform 5x sit to  11s without pain for improved functional strength and mobility.       Start:  06/24/25    Expected End:  09/22/25

## 2025-07-23 ENCOUNTER — APPOINTMENT (OUTPATIENT)
Dept: PHYSICAL THERAPY | Facility: CLINIC | Age: 70
End: 2025-07-23
Payer: MEDICARE

## 2025-07-24 ENCOUNTER — CLINICAL SUPPORT (OUTPATIENT)
Dept: PRIMARY CARE | Facility: CLINIC | Age: 70
End: 2025-07-24
Payer: MEDICARE

## 2025-07-24 ENCOUNTER — APPOINTMENT (OUTPATIENT)
Dept: PRIMARY CARE | Facility: CLINIC | Age: 70
End: 2025-07-24
Payer: MEDICARE

## 2025-07-24 ENCOUNTER — OFFICE VISIT (OUTPATIENT)
Dept: PRIMARY CARE | Facility: CLINIC | Age: 70
End: 2025-07-24
Payer: MEDICARE

## 2025-07-24 VITALS
RESPIRATION RATE: 20 BRPM | WEIGHT: 267.6 LBS | SYSTOLIC BLOOD PRESSURE: 120 MMHG | HEART RATE: 64 BPM | TEMPERATURE: 97.8 F | DIASTOLIC BLOOD PRESSURE: 78 MMHG | HEIGHT: 65 IN | OXYGEN SATURATION: 97 % | BODY MASS INDEX: 44.58 KG/M2

## 2025-07-24 DIAGNOSIS — G47.33 OBSTRUCTIVE SLEEP APNEA: ICD-10-CM

## 2025-07-24 DIAGNOSIS — E78.2 MIXED HYPERLIPIDEMIA: ICD-10-CM

## 2025-07-24 DIAGNOSIS — I10 BENIGN ESSENTIAL HYPERTENSION: ICD-10-CM

## 2025-07-24 DIAGNOSIS — E66.813 CLASS 3 SEVERE OBESITY DUE TO EXCESS CALORIES WITHOUT SERIOUS COMORBIDITY WITH BODY MASS INDEX (BMI) OF 40.0 TO 44.9 IN ADULT: ICD-10-CM

## 2025-07-24 DIAGNOSIS — R73.03 PREDIABETES: ICD-10-CM

## 2025-07-24 DIAGNOSIS — M48.062 SPINAL STENOSIS, LUMBAR REGION, WITH NEUROGENIC CLAUDICATION: ICD-10-CM

## 2025-07-24 DIAGNOSIS — Z00.00 ROUTINE MEDICAL EXAM: Primary | ICD-10-CM

## 2025-07-24 DIAGNOSIS — G47.33 OBSTRUCTIVE SLEEP APNEA: Primary | ICD-10-CM

## 2025-07-24 DIAGNOSIS — F41.8 DEPRESSION WITH ANXIETY: ICD-10-CM

## 2025-07-24 DIAGNOSIS — F51.01 PRIMARY INSOMNIA: ICD-10-CM

## 2025-07-24 LAB — POC HEMOGLOBIN A1C: 6 % (ref 4.2–6.5)

## 2025-07-24 PROCEDURE — 3074F SYST BP LT 130 MM HG: CPT | Performed by: FAMILY MEDICINE

## 2025-07-24 PROCEDURE — 3008F BODY MASS INDEX DOCD: CPT | Performed by: FAMILY MEDICINE

## 2025-07-24 PROCEDURE — 1170F FXNL STATUS ASSESSED: CPT | Performed by: FAMILY MEDICINE

## 2025-07-24 PROCEDURE — 99215 OFFICE O/P EST HI 40 MIN: CPT | Mod: 25 | Performed by: FAMILY MEDICINE

## 2025-07-24 PROCEDURE — 83036 HEMOGLOBIN GLYCOSYLATED A1C: CPT | Mod: QW | Performed by: FAMILY MEDICINE

## 2025-07-24 PROCEDURE — 1125F AMNT PAIN NOTED PAIN PRSNT: CPT | Performed by: FAMILY MEDICINE

## 2025-07-24 PROCEDURE — 99397 PER PM REEVAL EST PAT 65+ YR: CPT | Performed by: FAMILY MEDICINE

## 2025-07-24 PROCEDURE — G0439 PPPS, SUBSEQ VISIT: HCPCS | Performed by: FAMILY MEDICINE

## 2025-07-24 PROCEDURE — 1159F MED LIST DOCD IN RCRD: CPT | Performed by: FAMILY MEDICINE

## 2025-07-24 PROCEDURE — 3078F DIAST BP <80 MM HG: CPT | Performed by: FAMILY MEDICINE

## 2025-07-24 RX ORDER — TRAZODONE HYDROCHLORIDE 50 MG/1
50 TABLET ORAL NIGHTLY PRN
Qty: 30 TABLET | Refills: 0 | Status: SHIPPED | OUTPATIENT
Start: 2025-07-24 | End: 2026-07-24

## 2025-07-24 RX ORDER — TIRZEPATIDE 2.5 MG/.5ML
2.5 INJECTION, SOLUTION SUBCUTANEOUS
Qty: 2 ML | Refills: 1 | Status: SHIPPED | OUTPATIENT
Start: 2025-07-24

## 2025-07-24 ASSESSMENT — ACTIVITIES OF DAILY LIVING (ADL)
DRESSING: INDEPENDENT
GROCERY_SHOPPING: INDEPENDENT
TAKING_MEDICATION: INDEPENDENT
DOING_HOUSEWORK: INDEPENDENT
BATHING: INDEPENDENT
MANAGING_FINANCES: INDEPENDENT

## 2025-07-24 ASSESSMENT — PAIN SCALES - GENERAL: PAINLEVEL_OUTOF10: 8

## 2025-07-24 ASSESSMENT — PATIENT HEALTH QUESTIONNAIRE - PHQ9
2. FEELING DOWN, DEPRESSED OR HOPELESS: NOT AT ALL
SUM OF ALL RESPONSES TO PHQ9 QUESTIONS 1 AND 2: 0
1. LITTLE INTEREST OR PLEASURE IN DOING THINGS: NOT AT ALL

## 2025-07-24 ASSESSMENT — ENCOUNTER SYMPTOMS
OCCASIONAL FEELINGS OF UNSTEADINESS: 0
LOSS OF SENSATION IN FEET: 0
DEPRESSION: 0

## 2025-07-24 NOTE — PROGRESS NOTES
Physical Therapy Treatment    Patient Name: Dyana Irene  MRN: [id number]  Encounter date:  7/25/2025                Visit Number:  4 (including evaluation)  Planned total visits:    Visits Authorized/Insurance Coverage:  NO AUTH, [place] A/B, [place]314.[place] USED PT/ST OF $2410 MEDICARE CAP)     Current Problem  Problem List Items Addressed This Visit    None        Precautions       Pain       Subjective  ***    Objective  ***    Treatment:       Current HEP:  Access Code: [email or web address]  URL: [email or web address]  Date: 06/24/2025  Prepared by: Raymond Huddleston    Exercises  - Seated Transversus Abdominis Bracing  - 1 x daily - 7 x weekly - 2 sets - 10 reps - 3 second hold  - Seated Scapular Retraction  - 1 x daily - 7 x weekly - 2 sets - 10 reps - 3-5 seond hold  - Seated Flexion Stretch with Swiss Ball  - 1 x daily - 7 x weekly - 2 sets - 10 reps  - Seated March  - 1 x daily - 7 x weekly - 2 sets - 10 reps  - Seated Long Arc Quad  - 1 x daily - 7 x weekly - 2 sets - 10 reps  - Seated Hip Adduction Isometrics with Ball  - 1 x daily - 7 x weekly - 2 sets - 10 reps  - Seated Hip Abduction with Resistance  - 1 x daily - 7 x weekly - 2 sets - 10 reps    Has patient been consistent with performance of HEP? Yes    Assessment:  Pt's response to treatment: ***  Areas of improvements: ***  Limitations/deficits: ***    Pain end of session: ***/10    Plan:     Continue with current POC/no changes    Assessment of current progress against goals:  Insufficient treatment time to assess progress    Goals:  Active       PT Problem       STG       Start:  06/24/25    Expected End:  08/08/25       Pt will be independent in HEP to improve LE and core strength, mobility, and function.  Pt will report 50% reduction in pain with functional mobility such as standing, walking, and stairs.         Pt will increase strength in core and LEs by 1/2 MMT in all planes for improved performance of functional mobility.          Start:  06/24/25    Expected End:  09/22/25            Pt will demonstrate no more than 25% limitation in lumbar AROM without pain in all planes for improved ability to reach objects on floor.       Start:  06/24/25    Expected End:  09/22/25            Pt will ambulate moderate community distances across all surfaces without deviation and without pain.        Start:  06/24/25    Expected End:  09/22/25            Pt will stand for >30 minutes with UE support PRN without increase in pain for improved tolerance to household tasks such as cooking.         Start:  06/24/25    Expected End:  09/22/25            Pt will perform 5x sit to  11s without pain for improved functional strength and mobility.       Start:  06/24/25    Expected End:  09/22/25

## 2025-07-24 NOTE — PROGRESS NOTES
Patient received the following medication education on Zepbound:    - Counseled patient on MOA, expectations, side effects, duration of therapy, contraindications, administration, and monitoring parameters.  - Provided detailed dosing and administration counseling to ensure proper technique.   - Reviewed Zepbound titration schedule, starting with 2.5 mg once weekly to 5mg starting on the 5th week and up to 15mg if tolerated. Patient verbalized understanding  - Counseled patient on the benefits of GLP-1ra glycemic control and weight loss  - Reviewed storage requirements of Zepbound when not in use, and when to administer the medication if a dose is missed.  - Advised patient that they may experience improved satiety after meals and portion sizes of meals may be reduced as doses of Zepbound increase.    Medication education provided by ROCIO Bess, PharmD, BCPS

## 2025-07-24 NOTE — PROGRESS NOTES
"Subjective   Patient ID: Dyana Irene is a 70 y.o. female who presents for Annual Exam (Medicare annual and new patient. Pt said that she is having issues with insomnia. ) and Insomnia.    HPI     Review of Systems    Objective   /78   Pulse 64   Temp 36.6 °C (97.8 °F)   Resp 20   Ht 1.651 m (5' 5\")   Wt 121 kg (267 lb 9.6 oz)   SpO2 97%   BMI 44.53 kg/m²     Physical Exam  Vitals and nursing note reviewed.   Constitutional:       General: She is not in acute distress.  HENT:      Right Ear: Tympanic membrane and ear canal normal.      Left Ear: Tympanic membrane and ear canal normal.      Nose: Nose normal. No rhinorrhea.      Mouth/Throat:      Pharynx: Oropharynx is clear. No oropharyngeal exudate or posterior oropharyngeal erythema.      Comments: Dentition wnl    Eyes:      Extraocular Movements: Extraocular movements intact.      Conjunctiva/sclera: Conjunctivae normal.      Pupils: Pupils are equal, round, and reactive to light.     Neck:      Vascular: No carotid bruit.     Cardiovascular:      Rate and Rhythm: Normal rate and regular rhythm.      Heart sounds: Normal heart sounds. No murmur heard.  Pulmonary:      Breath sounds: Normal breath sounds. No wheezing or rhonchi.   Abdominal:      General: Bowel sounds are normal. There is no distension.      Palpations: Abdomen is soft. There is no mass.      Tenderness: There is no abdominal tenderness. There is no guarding or rebound.      Hernia: No hernia is present.     Musculoskeletal:         General: No swelling or tenderness. Normal range of motion.      Cervical back: Normal range of motion and neck supple.   Lymphadenopathy:      Cervical: No cervical adenopathy.     Skin:     General: Skin is warm.      Findings: No rash.     Neurological:      General: No focal deficit present.      Mental Status: She is alert.         Assessment/Plan   Problem List Items Addressed This Visit           ICD-10-CM    Insomnia G47.00    Mixed " hyperlipidemia E78.2    Relevant Orders    CBC and Auto Differential    Comprehensive Metabolic Panel    Lipid Panel    TSH with reflex to Free T4 if abnormal    Benign essential hypertension I10    Relevant Orders    CBC and Auto Differential    Comprehensive Metabolic Panel    Lipid Panel    TSH with reflex to Free T4 if abnormal    Spinal stenosis, lumbar region, with neurogenic claudication M48.062    Class 3 severe obesity due to excess calories without serious comorbidity with body mass index (BMI) of 40.0 to 44.9 in adult E66.813, Z68.41     Other Visit Diagnoses         Codes      Routine medical exam    -  Primary Z00.00    Relevant Orders    CBC and Auto Differential    Comprehensive Metabolic Panel    Lipid Panel    TSH with reflex to Free T4 if abnormal      Depression with anxiety     F41.8    Relevant Orders    TSH with reflex to Free T4 if abnormal

## 2025-07-25 ENCOUNTER — PATIENT MESSAGE (OUTPATIENT)
Dept: PHARMACY | Facility: HOSPITAL | Age: 70
End: 2025-07-25
Payer: MEDICARE

## 2025-07-25 ENCOUNTER — APPOINTMENT (OUTPATIENT)
Dept: PHYSICAL THERAPY | Facility: CLINIC | Age: 70
End: 2025-07-25
Payer: MEDICARE

## 2025-07-25 DIAGNOSIS — E66.813 CLASS 3 SEVERE OBESITY DUE TO EXCESS CALORIES WITHOUT SERIOUS COMORBIDITY WITH BODY MASS INDEX (BMI) OF 40.0 TO 44.9 IN ADULT: ICD-10-CM

## 2025-07-25 DIAGNOSIS — G47.33 OBSTRUCTIVE SLEEP APNEA: Primary | ICD-10-CM

## 2025-07-29 LAB
ALBUMIN SERPL-MCNC: 3.8 G/DL (ref 3.6–5.1)
ALP SERPL-CCNC: 65 U/L (ref 37–153)
ALT SERPL-CCNC: 15 U/L (ref 6–29)
ANION GAP SERPL CALCULATED.4IONS-SCNC: 8 MMOL/L (CALC) (ref 7–17)
AST SERPL-CCNC: 19 U/L (ref 10–35)
BASOPHILS # BLD AUTO: 52 CELLS/UL (ref 0–200)
BASOPHILS NFR BLD AUTO: 0.7 %
BILIRUB SERPL-MCNC: 0.5 MG/DL (ref 0.2–1.2)
BUN SERPL-MCNC: 15 MG/DL (ref 7–25)
CALCIUM SERPL-MCNC: 9.5 MG/DL (ref 8.6–10.4)
CHLORIDE SERPL-SCNC: 105 MMOL/L (ref 98–110)
CHOLEST SERPL-MCNC: 170 MG/DL
CHOLEST/HDLC SERPL: 3.4 (CALC)
CO2 SERPL-SCNC: 31 MMOL/L (ref 20–32)
CREAT SERPL-MCNC: 0.89 MG/DL (ref 0.6–1)
EGFRCR SERPLBLD CKD-EPI 2021: 69 ML/MIN/1.73M2
EOSINOPHIL # BLD AUTO: 178 CELLS/UL (ref 15–500)
EOSINOPHIL NFR BLD AUTO: 2.4 %
ERYTHROCYTE [DISTWIDTH] IN BLOOD BY AUTOMATED COUNT: 13.1 % (ref 11–15)
GLUCOSE SERPL-MCNC: 130 MG/DL (ref 65–99)
HCT VFR BLD AUTO: 44.9 % (ref 35–45)
HDLC SERPL-MCNC: 50 MG/DL
HGB BLD-MCNC: 14.8 G/DL (ref 11.7–15.5)
LDLC SERPL CALC-MCNC: 92 MG/DL (CALC)
LYMPHOCYTES # BLD AUTO: 1909 CELLS/UL (ref 850–3900)
LYMPHOCYTES NFR BLD AUTO: 25.8 %
MCH RBC QN AUTO: 30.6 PG (ref 27–33)
MCHC RBC AUTO-ENTMCNC: 33 G/DL (ref 32–36)
MCV RBC AUTO: 92.8 FL (ref 80–100)
MONOCYTES # BLD AUTO: 548 CELLS/UL (ref 200–950)
MONOCYTES NFR BLD AUTO: 7.4 %
NEUTROPHILS # BLD AUTO: 4714 CELLS/UL (ref 1500–7800)
NEUTROPHILS NFR BLD AUTO: 63.7 %
NONHDLC SERPL-MCNC: 120 MG/DL (CALC)
PLATELET # BLD AUTO: 175 THOUSAND/UL (ref 140–400)
PMV BLD REES-ECKER: 12.5 FL (ref 7.5–12.5)
POTASSIUM SERPL-SCNC: 4 MMOL/L (ref 3.5–5.3)
PROT SERPL-MCNC: 6.9 G/DL (ref 6.1–8.1)
RBC # BLD AUTO: 4.84 MILLION/UL (ref 3.8–5.1)
SODIUM SERPL-SCNC: 144 MMOL/L (ref 135–146)
TRIGL SERPL-MCNC: 182 MG/DL
TSH SERPL-ACNC: 0.55 MIU/L (ref 0.4–4.5)
WBC # BLD AUTO: 7.4 THOUSAND/UL (ref 3.8–10.8)

## 2025-07-30 ENCOUNTER — DOCUMENTATION (OUTPATIENT)
Dept: PHYSICAL THERAPY | Facility: CLINIC | Age: 70
End: 2025-07-30
Payer: MEDICARE

## 2025-07-30 NOTE — PROGRESS NOTES
Physical Therapy Treatment    Patient Name: Dyana Irene  MRN: [id number]  Encounter date:  7/31/2025  Time Calculation  Start Time: 1345  Stop Time: 1428  Time Calculation (min): 43 min     PT Therapeutic Procedures Time Entry  Aquatic Therapy Time Entry: 43       Visit Number:  4 (including evaluation)  Planned total visits: 10 visits  Visits Authorized/Insurance Coverage:  NO AUTH, MEDICARE A/B, [place], ($314.20 USED PT/ST OF $2410 MEDICARE CAP)     Current Problem  Problem List Items Addressed This Visit           ICD-10-CM    Lumbosacral spondylosis without myelopathy M47.817       Precautions  Precautions  Precautions Comment: None - PT WILL HAVE TWO PT PLANS OPEN, PLEASE SELECT CORRECT EPISODE FOR EACH VISIT    Pain  Pain Assessment: 0-10  0-10 (Numeric) Pain Score: 8    Subjective  Pain remains very high. Pt notes poor walking tolerance. She is getting short-lived relief after PT visits. She will be doing a trial period of a spinal cord stimulator at the end of August.     Objective  Significantly flexed trunk throughout visit     Treatment:  Aquatic Exercise  Aquatic Exercise Performed: Yes  Aquatic Exercise Activity 1: Walk x3 laps fwd, bwd, s/s across pool  Aquatic Exercise Activity 2: At rail, 4' depth, cues for TrA brace, x15 ea:  Aquatic Exercise Activity 3: Heel raise  Aquatic Exercise Activity 4: Hip abduction  Aquatic Exercise Activity 5: Hip extension  Aquatic Exercise Activity 6: Hip flexion/SLR  Aquatic Exercise Activity 7: Hamstring curl  Aquatic Exercise Activity 8: March across pool  Aquatic Exercise Activity 9: Deep, 5' with noodle under arms, BUE support:  Aquatic Exercise Activity 10: Decompression x2'  Aquatic Exercise Activity 11: Hip abd/adduction x2'  Aquatic Exercise Activity 12: XC ski x2'  Aquatic Exercise Activity 13: Bike x2'  Aquatic Exercise Activity 14: Decompression x2'    Current HEP:  Access Code: [email or web address]  URL: [email or web address]  Date:  06/24/2025  Prepared by: Raymond Huddleston    Exercises  - Seated Transversus Abdominis Bracing  - 1 x daily - 7 x weekly - 2 sets - 10 reps - 3 second hold  - Seated Scapular Retraction  - 1 x daily - 7 x weekly - 2 sets - 10 reps - 3-5 seond hold  - Seated Flexion Stretch with Swiss Ball  - 1 x daily - 7 x weekly - 2 sets - 10 reps  - Seated March  - 1 x daily - 7 x weekly - 2 sets - 10 reps  - Seated Long Arc Quad  - 1 x daily - 7 x weekly - 2 sets - 10 reps  - Seated Hip Adduction Isometrics with Ball  - 1 x daily - 7 x weekly - 2 sets - 10 reps  - Seated Hip Abduction with Resistance  - 1 x daily - 7 x weekly - 2 sets - 10 reps    Has patient been consistent with performance of HEP? Yes    Assessment:  Pt's response to treatment: Pt required moderate cueing for posture throughout visit with frequent return to flexed trunk both at rest and with exercise. Pt did feel some relief in pool but still had intermittent pain at end ranges of motion.   Areas of improvements: some relief within visit   Limitations/deficits: walking tolerance     Pain end of session: 6/10    Plan:  OP PT Plan  Treatment/Interventions: Electrical stimulation, Manual therapy, Neuromuscular re-education, Taping techniques, Therapeutic activities, Therapeutic exercises, Ultrasound, Aquatic therapy  PT Plan: Skilled PT  PT Frequency: 2 times per week  Duration: 10 visits  Certification Period Start Date: 06/24/25  Certification Period End Date: 09/22/25  Number of Treatments Authorized: [place]  Rehab Potential: Good  Plan of Care Agreement: Patient  Continue with current POC/no changes    Assessment of current progress against goals:  Insufficient treatment time to assess progress    Goals:  Active       PT Problem       STG       Start:  06/24/25    Expected End:  08/08/25       Pt will be independent in HEP to improve LE and core strength, mobility, and function.  Pt will report 50% reduction in pain with functional mobility such as standing,  walking, and stairs.         Pt will increase strength in core and LEs by 1/2 MMT in all planes for improved performance of functional mobility.         Start:  06/24/25    Expected End:  09/22/25            Pt will demonstrate no more than 25% limitation in lumbar AROM without pain in all planes for improved ability to reach objects on floor.       Start:  06/24/25    Expected End:  09/22/25            Pt will ambulate moderate community distances across all surfaces without deviation and without pain.        Start:  06/24/25    Expected End:  09/22/25            Pt will stand for >30 minutes with UE support PRN without increase in pain for improved tolerance to household tasks such as cooking.         Start:  06/24/25    Expected End:  09/22/25            Pt will perform 5x sit to  11s without pain for improved functional strength and mobility.       Start:  06/24/25    Expected End:  09/22/25

## 2025-07-30 NOTE — PROGRESS NOTES
Physical Therapy                 Therapy Communication Note    Patient Name: Dyana Irene  MRN: [id number]  Department:   Room: Room/bed info not found  Today's Date: 7/30/2025     Discipline: Physical Therapy    Missed Visit:       Missed Visit Reason:      Missed Time: No Show    Comment:   I called pt and left message to call back

## 2025-07-31 ENCOUNTER — TREATMENT (OUTPATIENT)
Dept: PHYSICAL THERAPY | Facility: CLINIC | Age: 70
End: 2025-07-31
Payer: MEDICARE

## 2025-07-31 DIAGNOSIS — M47.817 LUMBOSACRAL SPONDYLOSIS WITHOUT MYELOPATHY: ICD-10-CM

## 2025-07-31 PROCEDURE — 97113 AQUATIC THERAPY/EXERCISES: CPT | Mod: GP

## 2025-07-31 ASSESSMENT — PAIN - FUNCTIONAL ASSESSMENT: PAIN_FUNCTIONAL_ASSESSMENT: 0-10

## 2025-07-31 ASSESSMENT — PAIN SCALES - GENERAL: PAINLEVEL_OUTOF10: 8

## 2025-08-06 ENCOUNTER — TREATMENT (OUTPATIENT)
Dept: PHYSICAL THERAPY | Facility: CLINIC | Age: 70
End: 2025-08-06
Payer: MEDICARE

## 2025-08-06 DIAGNOSIS — R49.0 HOARSENESS: ICD-10-CM

## 2025-08-06 DIAGNOSIS — R42 DIZZINESS: ICD-10-CM

## 2025-08-06 PROCEDURE — 97110 THERAPEUTIC EXERCISES: CPT | Mod: GP | Performed by: PHYSICAL THERAPIST

## 2025-08-06 PROCEDURE — 97112 NEUROMUSCULAR REEDUCATION: CPT | Mod: GP | Performed by: PHYSICAL THERAPIST

## 2025-08-06 NOTE — PROGRESS NOTES
"Physical Therapy Treatment    Patient Name: Dyana Irene  MRN: [id number]  Encounter date:  8/6/2025  Time Calculation  Start Time: 1047  Stop Time: 1125  Time Calculation (min): 38 min          Visit Number:  3 (including evaluation)  Planned total visits: 6  Visit Authorized:  06/23/2025: NO AUTH, MEDICARE A/B, [place], [place], ($314.20 USED PT/ST OF $2410 MEDICARE CAP)     Current Problem  Problem List Items Addressed This Visit           ICD-10-CM    Dizziness R42     Other Visit Diagnoses         Codes      Hoarseness     R49.0          Precautions:    Pt WILL HAVE TWO PT PLANS OPEN, PLEASE SELECT CORRECT EPISODE FOR EACH VISIT     Relevant PMH:  Hx: eustachian tube dysfunction , spontaneous nystagmus, B TKA, fibromyalgia, stenosis, arthritis, HTN, emphysema, chronic pain    Pain   8  Location lb   Description SHARP, ACHE  Subjective  General        Some progress, no more vertigo attacks.    Objective  + VOR -more with L eye    Treatment:     NMR to improve balance:     TE's:  Seated:  VOR H AND V, 3 X 30\" ea    Stand on foam:  EO 60\"  EC 60\"  V and H HT's x 10 ea  VOR cancellation 2 x 30\"   Heel/toe rocks x DNP  March x DNP    Stand on wedge to control retropulsion: DNP    Amb with gaze stabilization and HT's 2 x 20' DNP  STS with HT's x 10 DNP  Backward amb DNP  Side step over DNP  Step over DNP      OP EDUCATION:  Consider a rollator for your longer walks     Continue HEP:    HEP:  Access Code: [email or web address]  URL: [email or web address]  Date: 07/02/2025  Prepared by: Ernesto Patel     Exercises  - Seated Gaze Stabilization with Head Rotation  - 3 x daily - 7 x weekly - 1 sets - 3 reps  - Seated Gaze Stabilization with Head Nod  - 3 x daily - 7 x weekly - 1 sets - 3 reps     VOR should be done in sets of 20- 30 seconds    Assessment:     Pt's response to treatment:  difficulty with VOR coordination and speed-a little better after cues to limit ROM  Areas of improvements:  HEP knowledge  Limitations/deficits: " " peripheral vest weakness    Pain end of session:  no change    Continued PT required to reach all goals and restore function    Plan     Continue VOR activity for peripheral vest weakness     Skilled PT consisting of:  therapeutic exercise, therapeutic activity, NMR, manual, thermal, electric stimulation, US, light therapy, gait training, canalith repositioning as needed  Rehab Potential: good  Frequency:  1x/wk  Duration: 20 weeks  Medicare cert date: 7/2/2025 to 9/26/2025     Goals:        STG: in 3 visits   Pt to be I in initial HEP.        LTG\"s:  in 6 visits  Patient to state no more vertigo attacks  Improve score on outcome form by 10 points to show incr in function.  Patient to state no dizziness with VOR          Some of This note was created using voice recognition software and was not corrected for typographical or grammatical errors.        "

## 2025-08-07 ENCOUNTER — TREATMENT (OUTPATIENT)
Dept: PHYSICAL THERAPY | Facility: CLINIC | Age: 70
End: 2025-08-07
Payer: MEDICARE

## 2025-08-07 DIAGNOSIS — M47.817 LUMBOSACRAL SPONDYLOSIS WITHOUT MYELOPATHY: ICD-10-CM

## 2025-08-07 PROCEDURE — 97113 AQUATIC THERAPY/EXERCISES: CPT | Mod: GP

## 2025-08-07 NOTE — PROGRESS NOTES
Physical Therapy Treatment    Patient Name: Dyana Irene  MRN: [id number]  YOB: 1955  Encounter Date: 8/7/2025    Time Entry:  Time Calculation  Start Time: 1045  Stop Time: 1125  Time Calculation (min): 40 min     PT Therapeutic Procedures Time Entry  Aquatic Therapy Time Entry: 40                   Rehab Insurance Information:   Visit Count: 5  POC Visits: 10  Auth Required: No  Medicare cert. dates: 06/24/25  Through: 09/22/25          Problem List Items Addressed This Visit           ICD-10-CM    Lumbosacral spondylosis without myelopathy M47.817       Precautions       Additional Precautions and Protocol Details: None    Subjective   Pt is feeling better today, but had a lot of pain yesterday without specific cause. She has been getting some temporary relief from aquatic visits..  Pain reported as 5.           Objective      Posture  Patient presents with a Forward head position. Flat lumbar spine is observed.   Shoulders are Rounded.                 Gait Analysis  Base of Support: Normal  Gait Pattern: Antalgic  Walking Speed: Decreased                   Activities       Aquatic Exercise  Aquatic Exercise Performed: Yes  Aquatic Exercise Activity 1: Walk x3 laps fwd, bwd, s/s across pool  Aquatic Exercise Activity 2: At rail, 4' depth, cues for TrA brace, x15 ea:  Aquatic Exercise Activity 3: Heel raise  Aquatic Exercise Activity 4: Hip abduction  Aquatic Exercise Activity 5: Hip extension  Aquatic Exercise Activity 6: Hip flexion/SLR  Aquatic Exercise Activity 7: Hamstring curl  Aquatic Exercise Activity 8: March across pool  Aquatic Exercise Activity 9: Deep, 5' with noodle under arms, BUE support:  Aquatic Exercise Activity 10: Decompression x2'  Aquatic Exercise Activity 11: Hip abd/adduction x2'  Aquatic Exercise Activity 12: XC ski x2'  Aquatic Exercise Activity 13: Bike x2'  Aquatic Exercise Activity 14: Decompression x2'                                                   Education              Access Code: [email or web address] URL: [email or web address] Date: 08/07/2025 Prepared by: Raymond Huddleston Exercises - Seated Transversus Abdominis Bracing  - 1 x daily - 7 x weekly - 2 sets - 10 reps - 3 second hold - Seated Scapular Retraction  - 1 x daily - 7 x weekly - 2 sets - 10 reps - 3-5 seond hold - Seated Flexion Stretch with Swiss Ball  - 1 x daily - 7 x weekly - 2 sets - 10 reps - Seated March  - 1 x daily - 7 x weekly - 2 sets - 10 reps - Seated Long Arc Quad  - 1 x daily - 7 x weekly - 2 sets - 10 reps - Seated Hip Adduction Isometrics with Ball  - 1 x daily - 7 x weekly - 2 sets - 10 reps - Seated Hip Abduction with Resistance  - 1 x daily - 7 x weekly - 2 sets - 10 reps      Assessment/Plan   Assessment: Pt still with somewhat flexed posture but was able to maintain without increase in flexion with standing exercise. Pt had less pain while moving in pool compared to previous visits. Pt with lasting relief at exit.      Active       STG (Progressing)       Start:  06/24/25    Expected End:  08/08/25       Pt will be independent in HEP to improve LE and core strength, mobility, and function.  Pt will report 50% reduction in pain with functional mobility such as standing, walking, and stairs.        Plan: continue aquatics, progress core stabilization as able

## 2025-08-11 ENCOUNTER — APPOINTMENT (OUTPATIENT)
Dept: PHYSICAL THERAPY | Facility: CLINIC | Age: 70
End: 2025-08-11
Payer: MEDICARE

## 2025-08-12 ENCOUNTER — TELEPHONE (OUTPATIENT)
Facility: CLINIC | Age: 70
End: 2025-08-12
Payer: MEDICARE

## 2025-08-13 ENCOUNTER — APPOINTMENT (OUTPATIENT)
Dept: PHYSICAL THERAPY | Facility: CLINIC | Age: 70
End: 2025-08-13
Payer: MEDICARE

## 2025-08-15 ENCOUNTER — APPOINTMENT (OUTPATIENT)
Dept: PHYSICAL THERAPY | Facility: CLINIC | Age: 70
End: 2025-08-15
Payer: MEDICARE

## 2025-08-15 ENCOUNTER — TELEPHONE (OUTPATIENT)
Dept: PAIN MEDICINE | Facility: CLINIC | Age: 70
End: 2025-08-15
Payer: MEDICARE

## 2025-08-15 DIAGNOSIS — F51.01 PRIMARY INSOMNIA: ICD-10-CM

## 2025-08-15 RX ORDER — TRAZODONE HYDROCHLORIDE 50 MG/1
50 TABLET ORAL NIGHTLY PRN
Qty: 90 TABLET | Refills: 1 | Status: SHIPPED | OUTPATIENT
Start: 2025-08-15 | End: 2026-08-15

## 2025-08-18 ENCOUNTER — TELEPHONE (OUTPATIENT)
Dept: PRIMARY CARE | Facility: CLINIC | Age: 70
End: 2025-08-18
Payer: MEDICARE

## 2025-08-18 DIAGNOSIS — E66.813 CLASS 3 SEVERE OBESITY DUE TO EXCESS CALORIES WITHOUT SERIOUS COMORBIDITY WITH BODY MASS INDEX (BMI) OF 40.0 TO 44.9 IN ADULT: Primary | ICD-10-CM

## 2025-08-18 RX ORDER — TIRZEPATIDE 5 MG/.5ML
5 INJECTION, SOLUTION SUBCUTANEOUS WEEKLY
Qty: 2 ML | Refills: 3 | Status: SHIPPED | OUTPATIENT
Start: 2025-08-18 | End: 2025-08-19 | Stop reason: ALTCHOICE

## 2025-08-19 ENCOUNTER — APPOINTMENT (OUTPATIENT)
Dept: PHARMACY | Facility: HOSPITAL | Age: 70
End: 2025-08-19
Payer: MEDICARE

## 2025-08-19 DIAGNOSIS — E66.813 CLASS 3 SEVERE OBESITY DUE TO EXCESS CALORIES WITHOUT SERIOUS COMORBIDITY WITH BODY MASS INDEX (BMI) OF 40.0 TO 44.9 IN ADULT: ICD-10-CM

## 2025-08-19 DIAGNOSIS — G47.33 OBSTRUCTIVE SLEEP APNEA: ICD-10-CM

## 2025-08-19 RX ORDER — TIRZEPATIDE 2.5 MG/.5ML
2.5 INJECTION, SOLUTION SUBCUTANEOUS
Qty: 2 ML | Refills: 2 | Status: SHIPPED | OUTPATIENT
Start: 2025-08-19

## 2025-08-19 RX ORDER — HYDROCORTISONE 25 MG/G
1 OINTMENT TOPICAL AS NEEDED
COMMUNITY
Start: 2025-08-14

## 2025-08-19 RX ORDER — LIDOCAINE 50 MG/G
1 PATCH TOPICAL DAILY PRN
COMMUNITY
Start: 2025-08-05

## 2025-08-20 ENCOUNTER — APPOINTMENT (OUTPATIENT)
Dept: PHYSICAL THERAPY | Facility: CLINIC | Age: 70
End: 2025-08-20
Payer: MEDICARE

## 2025-08-21 ENCOUNTER — APPOINTMENT (OUTPATIENT)
Dept: PHYSICAL THERAPY | Facility: CLINIC | Age: 70
End: 2025-08-21
Payer: MEDICARE

## 2025-08-22 ENCOUNTER — APPOINTMENT (OUTPATIENT)
Dept: PHYSICAL THERAPY | Facility: CLINIC | Age: 70
End: 2025-08-22
Payer: MEDICARE

## 2025-08-22 PROCEDURE — RXMED WILLOW AMBULATORY MEDICATION CHARGE

## 2025-08-25 ENCOUNTER — PHARMACY VISIT (OUTPATIENT)
Dept: PHARMACY | Facility: CLINIC | Age: 70
End: 2025-08-25
Payer: COMMERCIAL

## 2025-08-27 ENCOUNTER — APPOINTMENT (OUTPATIENT)
Dept: PHYSICAL THERAPY | Facility: CLINIC | Age: 70
End: 2025-08-27
Payer: MEDICARE

## 2025-08-29 ENCOUNTER — HOSPITAL ENCOUNTER (OUTPATIENT)
Dept: OPERATING ROOM | Facility: HOSPITAL | Age: 70
Setting detail: OUTPATIENT SURGERY
Discharge: HOME | End: 2025-08-29
Payer: MEDICARE

## 2025-08-29 ENCOUNTER — PHARMACY VISIT (OUTPATIENT)
Dept: PHARMACY | Facility: CLINIC | Age: 70
End: 2025-08-29
Payer: COMMERCIAL

## 2025-08-29 VITALS
RESPIRATION RATE: 17 BRPM | DIASTOLIC BLOOD PRESSURE: 57 MMHG | WEIGHT: 267 LBS | OXYGEN SATURATION: 99 % | TEMPERATURE: 97.7 F | SYSTOLIC BLOOD PRESSURE: 119 MMHG | HEIGHT: 65 IN | HEART RATE: 76 BPM | BODY MASS INDEX: 44.48 KG/M2

## 2025-08-29 DIAGNOSIS — G89.29 OTHER CHRONIC PAIN: ICD-10-CM

## 2025-08-29 DIAGNOSIS — G54.1 LUMBOSACRAL PLEXOPATHY: ICD-10-CM

## 2025-08-29 PROCEDURE — 2500000004 HC RX 250 GENERAL PHARMACY W/ HCPCS (ALT 636 FOR OP/ED): Performed by: ANESTHESIOLOGY

## 2025-08-29 PROCEDURE — C1897 LEAD, NEUROSTIM TEST KIT: HCPCS

## 2025-08-29 PROCEDURE — 3700000012 HC SEDATION LEVEL 5+ TIME - INITIAL 15 MINUTES 5/> YEARS

## 2025-08-29 PROCEDURE — RXMED WILLOW AMBULATORY MEDICATION CHARGE

## 2025-08-29 PROCEDURE — 7100000010 HC PHASE TWO TIME - EACH INCREMENTAL 1 MINUTE

## 2025-08-29 PROCEDURE — 3600000006 HC OR TIME - EACH INCREMENTAL 1 MINUTE - PROCEDURE LEVEL ONE

## 2025-08-29 PROCEDURE — 2780000003 HC OR 278 NO HCPCS

## 2025-08-29 PROCEDURE — 3700000013 HC SEDATION LEVEL 5+ TIME - EACH ADDITIONAL 15 MINUTES

## 2025-08-29 PROCEDURE — 7100000009 HC PHASE TWO TIME - INITIAL BASE CHARGE

## 2025-08-29 PROCEDURE — 3600000001 HC OR TIME - INITIAL BASE CHARGE - PROCEDURE LEVEL ONE

## 2025-08-29 PROCEDURE — C1778 LEAD, NEUROSTIMULATOR: HCPCS

## 2025-08-29 RX ORDER — FENTANYL CITRATE 50 UG/ML
INJECTION, SOLUTION INTRAMUSCULAR; INTRAVENOUS AS NEEDED
Status: COMPLETED | OUTPATIENT
Start: 2025-08-29 | End: 2025-08-29

## 2025-08-29 RX ORDER — LIDOCAINE HYDROCHLORIDE 20 MG/ML
INJECTION, SOLUTION EPIDURAL; INFILTRATION; INTRACAUDAL; PERINEURAL AS NEEDED
Status: COMPLETED | OUTPATIENT
Start: 2025-08-29 | End: 2025-08-29

## 2025-08-29 RX ORDER — CEPHALEXIN 500 MG/1
500 CAPSULE ORAL 4 TIMES DAILY
Qty: 28 CAPSULE | Refills: 0 | Status: SHIPPED | OUTPATIENT
Start: 2025-08-29 | End: 2025-09-05

## 2025-08-29 RX ORDER — CEFAZOLIN SODIUM 2 G/100ML
INJECTION, SOLUTION INTRAVENOUS CONTINUOUS PRN
Status: COMPLETED | OUTPATIENT
Start: 2025-08-29 | End: 2025-08-29

## 2025-08-29 RX ORDER — MIDAZOLAM HYDROCHLORIDE 1 MG/ML
INJECTION, SOLUTION INTRAMUSCULAR; INTRAVENOUS AS NEEDED
Status: COMPLETED | OUTPATIENT
Start: 2025-08-29 | End: 2025-08-29

## 2025-08-29 RX ADMIN — CEFAZOLIN SODIUM 2 G: 2 INJECTION, SOLUTION INTRAVENOUS at 13:18

## 2025-08-29 RX ADMIN — MIDAZOLAM 1 MG: 1 INJECTION INTRAMUSCULAR; INTRAVENOUS at 13:19

## 2025-08-29 RX ADMIN — LIDOCAINE HYDROCHLORIDE 20 ML: 20 INJECTION, SOLUTION EPIDURAL; INFILTRATION; INTRACAUDAL; PERINEURAL at 13:20

## 2025-08-29 RX ADMIN — FENTANYL CITRATE 50 MCG: 0.05 INJECTION, SOLUTION INTRAMUSCULAR; INTRAVENOUS at 13:19

## 2025-08-29 ASSESSMENT — PAIN SCALES - GENERAL
PAINLEVEL_OUTOF10: 0 - NO PAIN
PAINLEVEL_OUTOF10: 0 - NO PAIN
PAINLEVEL_OUTOF10: 3

## 2025-08-29 ASSESSMENT — PAIN - FUNCTIONAL ASSESSMENT
PAIN_FUNCTIONAL_ASSESSMENT: 0-10

## 2025-08-29 ASSESSMENT — PAIN DESCRIPTION - DESCRIPTORS: DESCRIPTORS: ACHING;BURNING;SHARP

## 2025-08-29 ASSESSMENT — ENCOUNTER SYMPTOMS: BACK PAIN: 1

## 2025-09-04 ENCOUNTER — OFFICE VISIT (OUTPATIENT)
Facility: CLINIC | Age: 70
End: 2025-09-04
Payer: MEDICARE

## 2025-09-04 VITALS
DIASTOLIC BLOOD PRESSURE: 70 MMHG | RESPIRATION RATE: 24 BRPM | OXYGEN SATURATION: 99 % | WEIGHT: 267 LBS | HEIGHT: 65 IN | SYSTOLIC BLOOD PRESSURE: 120 MMHG | HEART RATE: 77 BPM | BODY MASS INDEX: 44.48 KG/M2

## 2025-09-04 DIAGNOSIS — M48.062 SPINAL STENOSIS, LUMBAR REGION, WITH NEUROGENIC CLAUDICATION: Primary | ICD-10-CM

## 2025-09-04 DIAGNOSIS — M54.16 LUMBAR RADICULOPATHY: ICD-10-CM

## 2025-09-04 PROCEDURE — 3074F SYST BP LT 130 MM HG: CPT | Performed by: ANESTHESIOLOGY

## 2025-09-04 PROCEDURE — 3078F DIAST BP <80 MM HG: CPT | Performed by: ANESTHESIOLOGY

## 2025-09-04 PROCEDURE — 3008F BODY MASS INDEX DOCD: CPT | Performed by: ANESTHESIOLOGY

## 2025-09-04 PROCEDURE — 99212 OFFICE O/P EST SF 10 MIN: CPT

## 2025-09-04 PROCEDURE — 1159F MED LIST DOCD IN RCRD: CPT | Performed by: ANESTHESIOLOGY

## 2025-09-04 PROCEDURE — 1160F RVW MEDS BY RX/DR IN RCRD: CPT | Performed by: ANESTHESIOLOGY

## 2025-09-04 PROCEDURE — 1125F AMNT PAIN NOTED PAIN PRSNT: CPT | Performed by: ANESTHESIOLOGY

## 2025-09-04 ASSESSMENT — PATIENT HEALTH QUESTIONNAIRE - PHQ9
1. LITTLE INTEREST OR PLEASURE IN DOING THINGS: NOT AT ALL
2. FEELING DOWN, DEPRESSED OR HOPELESS: NOT AT ALL
SUM OF ALL RESPONSES TO PHQ9 QUESTIONS 1 & 2: 0

## 2025-09-04 ASSESSMENT — ENCOUNTER SYMPTOMS
NEUROLOGICAL NEGATIVE: 1
BACK PAIN: 1
HEMATOLOGIC/LYMPHATIC NEGATIVE: 1
EYES NEGATIVE: 1
ENDOCRINE NEGATIVE: 1
PSYCHIATRIC NEGATIVE: 1
ALLERGIC/IMMUNOLOGIC NEGATIVE: 1
RESPIRATORY NEGATIVE: 1
CARDIOVASCULAR NEGATIVE: 1
CONSTITUTIONAL NEGATIVE: 1
GASTROINTESTINAL NEGATIVE: 1

## 2025-09-04 ASSESSMENT — PAIN - FUNCTIONAL ASSESSMENT: PAIN_FUNCTIONAL_ASSESSMENT: 0-10

## 2025-09-04 ASSESSMENT — PAIN SCALES - GENERAL
PAINLEVEL_OUTOF10: 4
PAINLEVEL_OUTOF10: 4

## 2025-09-04 ASSESSMENT — PAIN DESCRIPTION - DESCRIPTORS: DESCRIPTORS: ACHING

## 2025-09-22 ENCOUNTER — APPOINTMENT (OUTPATIENT)
Dept: PHYSICAL THERAPY | Facility: CLINIC | Age: 70
End: 2025-09-22
Payer: MEDICARE

## 2025-09-24 ENCOUNTER — APPOINTMENT (OUTPATIENT)
Dept: DERMATOLOGY | Facility: CLINIC | Age: 70
End: 2025-09-24
Payer: MEDICARE

## 2026-02-12 ENCOUNTER — APPOINTMENT (OUTPATIENT)
Dept: DERMATOLOGY | Facility: CLINIC | Age: 71
End: 2026-02-12
Payer: MEDICARE